# Patient Record
Sex: MALE | Race: WHITE | Employment: PART TIME | URBAN - NONMETROPOLITAN AREA
[De-identification: names, ages, dates, MRNs, and addresses within clinical notes are randomized per-mention and may not be internally consistent; named-entity substitution may affect disease eponyms.]

---

## 2022-08-08 ENCOUNTER — OFFICE VISIT (OUTPATIENT)
Dept: FAMILY MEDICINE CLINIC | Age: 62
End: 2022-08-08
Payer: MEDICARE

## 2022-08-08 VITALS
BODY MASS INDEX: 30.06 KG/M2 | HEART RATE: 88 BPM | DIASTOLIC BLOOD PRESSURE: 76 MMHG | HEIGHT: 70 IN | WEIGHT: 210 LBS | RESPIRATION RATE: 20 BRPM | TEMPERATURE: 98.3 F | OXYGEN SATURATION: 96 % | SYSTOLIC BLOOD PRESSURE: 138 MMHG

## 2022-08-08 DIAGNOSIS — R11.0 NAUSEA: Primary | ICD-10-CM

## 2022-08-08 PROCEDURE — 99213 OFFICE O/P EST LOW 20 MIN: CPT

## 2022-08-08 RX ORDER — ONDANSETRON 4 MG/1
4 TABLET, ORALLY DISINTEGRATING ORAL 3 TIMES DAILY PRN
Qty: 21 TABLET | Refills: 0 | Status: SHIPPED | OUTPATIENT
Start: 2022-08-08

## 2022-08-08 RX ORDER — MIRTAZAPINE 15 MG/1
15 TABLET, FILM COATED ORAL NIGHTLY
COMMUNITY

## 2022-08-08 RX ORDER — BUPRENORPHINE AND NALOXONE 8; 2 MG/1; MG/1
1 FILM, SOLUBLE BUCCAL; SUBLINGUAL DAILY
COMMUNITY

## 2022-08-08 NOTE — PROGRESS NOTES
Chief Complaint       Nausea  X years   History of Present Illness   Source of history provided by:  patient. Ramos Ghosh is a 57 y.o. old male presenting to the walk in clinic for complaints of nausea and \"nervous stomach\" x years. He is out of Zofran and his OTC medications have not been helping. Denies any vomiting, diarrhea, changes to bowel movements or abdominal pain. Denies any fever, bloody stools, loss of taste/smell, hematochezia, CP, SOB, dysuria, hematuria, HA, sore throat, rash, or lethargy. No LMP for male patient. ROS    Unless otherwise stated in this report or unable to obtain because of the patient's clinical or mental status as evidenced by the medical record, this patients's positive and negative responses for Review of Systems, constitutional, psych, eyes, ENT, cardiovascular, respiratory, gastrointestinal, neurological, genitourinary, musculoskeletal, integument systems and systems related to the presenting problem are either stated in the preceding or were not pertinent or were negative for the symptoms and/or complaints related to the medical problem. Physical Exam         VS:  /76   Pulse 88   Temp 98.3 °F (36.8 °C) (Temporal)   Resp 20   Ht 5' 10\" (1.778 m)   Wt 210 lb (95.3 kg)   SpO2 96%   BMI 30.13 kg/m²    Oxygen Saturation Interpretation: Normal.    General Appearance/Constitutional:  Alert, development consistent with age, NAD. HEENT:  NCAT. MMMP  Lungs: CTAB without wheezing, rales, or rhonchi. Heart:  RRR, no murmurs, rubs, or gallops. Abdomen:  General Appearance: No obvious trauma or bruising. No rashes or lesions. Bowel sounds: BS+x4       Distension:  No distension. Tenderness: None       Liver/Spleen: Non-tender and no hepatosplenomegaly. Pulsatile Mass: None noted. Back: CVA Tenderness: No bilateral tenderness or bruising. Skin:  Normal turgor.   Warm, dry, without visible rash, unless noted elsewhere. Neurological:  Orientation age-appropriate. Motor functions intact. Lab / Imaging Results   (All laboratory and radiology results have been personally reviewed by myself)  Labs:  No results found for this visit on 08/08/22. Imaging: All Radiology results interpreted by Radiologist unless otherwise noted. Assessment / Plan     Impression(s):  Jay Sunshine was seen today for nausea. Diagnoses and all orders for this visit:    Nausea  -     ondansetron (ZOFRAN ODT) 4 MG disintegrating tablet; Take 1 tablet by mouth 3 times daily as needed for Nausea or Vomiting    Disposition:  Disposition: Discharge to home. Script written for zofran, side effects discussed. Increase fluids and rest. Clear liquids progressing to [employer] as tolerated. Advise f/u with PCP in 3-5 days if symptoms persist. ED sooner if symptoms worsen or change. ED immediately with the development of high or refractory fever, severe or worsening abdominal pain, hematochezia, coffee-ground emesis, bloody stools, lethargy, CP, or SOB. Pt states understanding and is in agreement with this care plan. All questions answered. GRETA Coello    **This report was transcribed using voice recognition software. Every effort was made to ensure accuracy; however, inadvertent computerized transcription errors may be present.

## 2022-11-07 ENCOUNTER — OFFICE VISIT (OUTPATIENT)
Dept: FAMILY MEDICINE CLINIC | Age: 62
End: 2022-11-07
Payer: MEDICARE

## 2022-11-07 VITALS
WEIGHT: 216 LBS | HEIGHT: 70 IN | TEMPERATURE: 98 F | OXYGEN SATURATION: 96 % | RESPIRATION RATE: 20 BRPM | BODY MASS INDEX: 30.92 KG/M2 | DIASTOLIC BLOOD PRESSURE: 70 MMHG | HEART RATE: 88 BPM | SYSTOLIC BLOOD PRESSURE: 126 MMHG

## 2022-11-07 DIAGNOSIS — R30.0 DYSURIA: ICD-10-CM

## 2022-11-07 DIAGNOSIS — N39.0 COMPLICATED URINARY TRACT INFECTION: Primary | ICD-10-CM

## 2022-11-07 LAB
BILIRUBIN, POC: ABNORMAL
BLOOD URINE, POC: ABNORMAL
CLARITY, POC: ABNORMAL
COLOR, POC: ABNORMAL
GLUCOSE URINE, POC: ABNORMAL
KETONES, POC: ABNORMAL
LEUKOCYTE EST, POC: ABNORMAL
NITRITE, POC: POSITIVE
PH, POC: 5
PROTEIN, POC: 100
SPECIFIC GRAVITY, POC: 1.02
UROBILINOGEN, POC: 0.2

## 2022-11-07 PROCEDURE — 99214 OFFICE O/P EST MOD 30 MIN: CPT | Performed by: NURSE PRACTITIONER

## 2022-11-07 PROCEDURE — 81002 URINALYSIS NONAUTO W/O SCOPE: CPT | Performed by: NURSE PRACTITIONER

## 2022-11-07 RX ORDER — ALBUTEROL SULFATE 2.5 MG/3ML
SOLUTION RESPIRATORY (INHALATION)
COMMUNITY
Start: 2022-08-01

## 2022-11-07 RX ORDER — MIRTAZAPINE 30 MG/1
TABLET, FILM COATED ORAL
COMMUNITY
Start: 2022-11-04

## 2022-11-07 RX ORDER — CIPROFLOXACIN 500 MG/1
500 TABLET, FILM COATED ORAL 2 TIMES DAILY
Qty: 14 TABLET | Refills: 0 | Status: SHIPPED | OUTPATIENT
Start: 2022-11-07 | End: 2022-11-14

## 2022-11-07 RX ORDER — PHENAZOPYRIDINE HYDROCHLORIDE 100 MG/1
100 TABLET, FILM COATED ORAL 3 TIMES DAILY PRN
Qty: 6 TABLET | Refills: 0 | Status: SHIPPED | OUTPATIENT
Start: 2022-11-07 | End: 2022-11-09

## 2022-11-07 RX ORDER — IBUPROFEN 200 MG
200 TABLET ORAL EVERY 6 HOURS PRN
COMMUNITY

## 2022-11-07 NOTE — PROGRESS NOTES
2022     Ana Maria Agn 60 y.o. male    : 1960  Chief Complaint:   Dysuria      History of Present Illness   Source of history provided by:  patient. Ana Maria Ang is a 60 y.o. old male who presents to the [facility] care with complaints of dysuria x 3 days. Reports associated frequency, urgency, and suprapubic pressure. Denies gross hematuria. Denies associated flank pain. Denies any fever, chills, nausea, vomiting, diarrhea, or lethargy. ROS   Past Medical History: History reviewed. No pertinent past medical history. Past Surgical History: History reviewed. No pertinent surgical history. Social History:  reports that he has been smoking cigarettes. He has never used smokeless tobacco.  Family History: family history is not on file. Allergies: Citalopram, Gabapentin, and Ketorolac tromethamine    Unless otherwise stated in this report the patient's positive and negative responses for review of systems for constitutional, eyes, ENT, cardiovascular, respiratory, gastrointestinal, neurological, , musculoskeletal, and integument systems and related systems to the presenting problem are either stated in the history of present illness or were not pertinent or were negative for the symptoms and/or complaints related to the presenting medical problem. Positives and pertinent negatives as per HPI. All others reviewed and are negative. Physical Exam   VS:   Vitals:    22 1400   BP: 126/70   Pulse: 88   Resp: 20   Temp: 98 °F (36.7 °C)   TempSrc: Temporal   SpO2: 96%   Weight: 216 lb (98 kg)   Height: 5' 10\" (1.778 m)     Oxygen Saturation Interpretation: Normal.    Constitutional:  A&Ox3, development consistent with age, NAD. Lungs:  CTAB without wheezing, rales, or rhonchi. Heart:  RRR without pathologic murmurs, rubs, or gallops. Abdomen: Soft, nondistended, with mild suprapubic tenderness. No rebound, rigidity, or guarding. BS+ X4. No organomegaly.      Back: No CVA tenderness. Skin:  Normal turgor. Warm, dry, without visible rash, unless noted elsewhere. Neurological:  Alert and oriented. Motor functions intact. Responds to verbal commands. Lab / Imaging Results   All laboratory and radiology results have been personally reviewed by myself. Labs:  Results for orders placed or performed in visit on 11/07/22   POCT Urinalysis no Micro   Result Value Ref Range    Color, UA tyson     Clarity, UA cloudy     Glucose, UA POC neg     Bilirubin, UA neg     Ketones, UA neg     Spec Grav, UA 1.025     Blood, UA POC large     pH, UA 5.0     Protein, UA      Urobilinogen, UA 0.2     Leukocytes, UA large     Nitrite, UA positive        Imaging: All Radiology results interpreted by Radiologist unless otherwise noted. No orders to display       Medical Decision Making:    Patient is well appearing, non toxic and appropriate for outpatient management. Plan is for symptom management and PCP follow up. Assessment / Prudence Carina was seen today for dysuria. Diagnoses and all orders for this visit:    Complicated urinary tract infection  -     ciprofloxacin (CIPRO) 500 MG tablet; Take 1 tablet by mouth 2 times daily for 7 days  -     phenazopyridine (PYRIDIUM) 100 MG tablet; Take 1 tablet by mouth 3 times daily as needed for Pain    Dysuria  -     POCT Urinalysis no Micro  -     Culture, Urine; Future      UA appears positive for a UTI. Urine C&S pending, will call with results once available. Script written for Cipro and Pyridium, side effects discussed. Increase fluids and rest. F/u PCP in 3-5 days if symptoms persist. ED sooner if symptoms worsen or change. ED immediately with the development of fever, shaking chills, body aches, flank pain, vomiting, CP, or SOB. Pt is in agreement with this care plan. All questions answered.      Justin Mortimer, APRN - NP

## 2022-11-10 LAB
ORGANISM: ABNORMAL
URINE CULTURE, ROUTINE: ABNORMAL

## 2022-12-05 ENCOUNTER — OFFICE VISIT (OUTPATIENT)
Dept: FAMILY MEDICINE CLINIC | Age: 62
End: 2022-12-05
Payer: MEDICARE

## 2022-12-05 VITALS
SYSTOLIC BLOOD PRESSURE: 130 MMHG | HEIGHT: 70 IN | OXYGEN SATURATION: 96 % | TEMPERATURE: 97 F | DIASTOLIC BLOOD PRESSURE: 80 MMHG | BODY MASS INDEX: 30.92 KG/M2 | RESPIRATION RATE: 22 BRPM | WEIGHT: 216 LBS | HEART RATE: 82 BPM

## 2022-12-05 DIAGNOSIS — R30.0 DYSURIA: ICD-10-CM

## 2022-12-05 DIAGNOSIS — N20.0 LEFT NEPHROLITHIASIS: Primary | ICD-10-CM

## 2022-12-05 LAB
BILIRUBIN, POC: ABNORMAL
BLOOD URINE, POC: ABNORMAL
CLARITY, POC: ABNORMAL
COLOR, POC: YELLOW
GLUCOSE URINE, POC: ABNORMAL
KETONES, POC: ABNORMAL
LEUKOCYTE EST, POC: ABNORMAL
NITRITE, POC: ABNORMAL
PH, POC: 5
PROTEIN, POC: 100
SPECIFIC GRAVITY, POC: >=1.03
UROBILINOGEN, POC: 0.2

## 2022-12-05 PROCEDURE — 99214 OFFICE O/P EST MOD 30 MIN: CPT | Performed by: NURSE PRACTITIONER

## 2022-12-05 PROCEDURE — 81002 URINALYSIS NONAUTO W/O SCOPE: CPT | Performed by: NURSE PRACTITIONER

## 2022-12-05 NOTE — PROGRESS NOTES
2022     Heena Mancia 60 y.o. male    : 1960  Chief Complaint:   Dysuria (/)      History of Present Illness   Source of history provided by:  patient. Heena Mancia is a 60 y.o. old male who presents to the [facility] care with complaints of dysuria x 3 days. Reports associated frequency, urgency and hesitancy. Denies gross hematuria. Denies associated flank pain. Denies any fever, chills, nausea, vomiting, diarrhea, or lethargy. Patient presented on  for similar etiology. At that time his UA was positive and he was treated with ciprofloxacin. Urine culture grew greater less than 25,000 E. coli. He reports that he took this antibiotic in its entirety and symptoms completely resolved. ROS   Past Medical History: History reviewed. No pertinent past medical history. Past Surgical History: History reviewed. No pertinent surgical history. Social History:  reports that he has been smoking cigarettes. He has never used smokeless tobacco.  Family History: family history is not on file. Allergies: Citalopram, Gabapentin, and Ketorolac tromethamine    Unless otherwise stated in this report the patient's positive and negative responses for review of systems for constitutional, eyes, ENT, cardiovascular, respiratory, gastrointestinal, neurological, , musculoskeletal, and integument systems and related systems to the presenting problem are either stated in the history of present illness or were not pertinent or were negative for the symptoms and/or complaints related to the presenting medical problem. Positives and pertinent negatives as per HPI. All others reviewed and are negative.     Physical Exam   VS:   Vitals:    22 1331   BP: 130/80   Pulse: 82   Resp: 22   Temp: 97 °F (36.1 °C)   TempSrc: Temporal   SpO2: 96%   Weight: 216 lb (98 kg)   Height: 5' 10\" (1.778 m)     Oxygen Saturation Interpretation: Normal.    Constitutional:  A&Ox3, development consistent with age, NAD. Lungs:  CTAB without wheezing, rales, or rhonchi. Heart:  RRR without pathologic murmurs, rubs, or gallops. Abdomen: Soft, nondistended, with mild suprapubic tenderness. No rebound, rigidity, or guarding. BS+ X4. No organomegaly. Back: No CVA tenderness. He does complain of left lumbar back pain intermittently. Skin:  Normal turgor. Warm, dry, without visible rash, unless noted elsewhere. Neurological:  Alert and oriented. Motor functions intact. Responds to verbal commands. Lab / Imaging Results   All laboratory and radiology results have been personally reviewed by myself. Labs:  Results for orders placed or performed in visit on 12/05/22   POCT Urinalysis no Micro   Result Value Ref Range    Color, UA yellow     Clarity, UA cloudy     Glucose, UA POC neg     Bilirubin, UA small     Ketones, UA trace     Spec Grav, UA >=1.030     Blood, UA POC neg     pH, UA 5.0     Protein, UA      Urobilinogen, UA 0.2     Leukocytes, UA small     Nitrite, UA neg        Imaging: All Radiology results interpreted by Radiologist unless otherwise noted. XR ABDOMEN (KUB) (SINGLE AP VIEW)    (Results Pending)       Medical Decision Making:    Patient is well appearing, non toxic and appropriate for outpatient management. Plan is for symptom management and PCP follow up. Assessment / Louana Maker was seen today for dysuria. Diagnoses and all orders for this visit:    Left nephrolithiasis    Dysuria  -     POCT Urinalysis no Micro  -     Culture, Urine; Future  -     XR ABDOMEN (KUB) (SINGLE AP VIEW); Future    Other orders  -     tamsulosin (FLOMAX) 0.4 MG capsule; Take 1 capsule by mouth daily      UA appears negative for a UTI. Urine C&S pending, will call with results once available. Will obtain x-ray KUB to rule out possible nephrolithiasis. If this is negative I suspect this is BPH etiology and patient should follow-up with PCP for further evaluation.   Increase fluids and rest. F/u PCP in 3-5 days if symptoms persist. ED sooner if symptoms worsen or change. ED immediately with the development of fever, shaking chills, body aches, flank pain, vomiting, CP, or SOB. Pt is in agreement with this care plan. All questions answered. STUART Alexander - NP     ADDENDUM: I called and personally spoke to the patient at 1900 on 12/6/2022 re: KUB results. He is positive for a 3mm nephrolithiasis to the left renal pole, no hydronephrosis. I called in flomax to his local pharmacy, side effects and administration instructions discussed. Patient is to follow up with his PCP in 3-5 days. Increase fluids and strain all urine. Patient was agreeable to the above plan and verbalized understanding.

## 2022-12-06 RX ORDER — TAMSULOSIN HYDROCHLORIDE 0.4 MG/1
0.4 CAPSULE ORAL DAILY
Qty: 30 CAPSULE | Refills: 0 | Status: SHIPPED | OUTPATIENT
Start: 2022-12-06

## 2022-12-08 LAB — URINE CULTURE, ROUTINE: NORMAL

## 2022-12-29 RX ORDER — TAMSULOSIN HYDROCHLORIDE 0.4 MG/1
CAPSULE ORAL
Qty: 30 CAPSULE | Refills: 0 | OUTPATIENT
Start: 2022-12-29

## 2023-02-09 ENCOUNTER — OFFICE VISIT (OUTPATIENT)
Dept: FAMILY MEDICINE CLINIC | Age: 63
End: 2023-02-09
Payer: MEDICARE

## 2023-02-09 VITALS
RESPIRATION RATE: 20 BRPM | DIASTOLIC BLOOD PRESSURE: 64 MMHG | BODY MASS INDEX: 30.92 KG/M2 | WEIGHT: 216 LBS | HEART RATE: 91 BPM | OXYGEN SATURATION: 96 % | HEIGHT: 70 IN | TEMPERATURE: 97.1 F | SYSTOLIC BLOOD PRESSURE: 130 MMHG

## 2023-02-09 DIAGNOSIS — S40.861A INSECT BITE OF RIGHT UPPER EXTREMITY, INITIAL ENCOUNTER: Primary | ICD-10-CM
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
## 2023-02-09 PROCEDURE — 99213 OFFICE O/P EST LOW 20 MIN: CPT

## 2023-02-09 RX ORDER — ESCITALOPRAM OXALATE 10 MG/1
TABLET ORAL
COMMUNITY
Start: 2023-01-25

## 2023-02-09 RX ORDER — DOXYCYCLINE HYCLATE 100 MG
100 TABLET ORAL 2 TIMES DAILY
Qty: 20 TABLET | Refills: 0 | Status: SHIPPED | OUTPATIENT
Start: 2023-02-09 | End: 2023-02-19

## 2023-02-09 RX ORDER — TRAZODONE HYDROCHLORIDE 150 MG/1
TABLET ORAL
COMMUNITY
Start: 2023-01-25

## 2023-02-09 RX ORDER — TRAZODONE HYDROCHLORIDE 100 MG/1
TABLET ORAL
COMMUNITY
Start: 2022-12-28

## 2023-02-09 ASSESSMENT — ENCOUNTER SYMPTOMS
STRIDOR: 0
SINUS PRESSURE: 0
COUGH: 0
ABDOMINAL PAIN: 0
APNEA: 0
SORE THROAT: 0
VOMITING: 0
SHORTNESS OF BREATH: 0
CHEST TIGHTNESS: 0

## 2023-02-09 NOTE — PROGRESS NOTES
Chief Complaint:   Insect Bite      History of Present Illness   HPI:  Cornelius Phillips is a 60 y.o. male who presents to [facility] today for possible spider bite for 2 weeks. Denies fevers,  chills, ,n/v/d, CP, or SOB. Tetanus UTD. Pt states he \"lanced\" it at home with a knife and nothing drained. Prior to Visit Medications    Medication Sig Taking? Authorizing Provider   escitalopram (LEXAPRO) 10 MG tablet take 1 tablet by mouth at bedtime Yes Historical Provider, MD   traZODone (DESYREL) 100 MG tablet take 1 tablet by mouth at bedtime if needed for insomnia Yes Historical Provider, MD   traZODone (DESYREL) 150 MG tablet take 1-2 tablets by mouth at bedtime Yes Historical Provider, MD   tamsulosin (FLOMAX) 0.4 MG capsule Take 1 capsule by mouth daily Yes STUART Arceo - NP   ANORO ELLIPTA 62.5-25 MCG/INH AEPB inhaler inhale 1 puff by mouth and INTO THE LUNGS once daily Yes Historical Provider, MD   mirtazapine (REMERON) 30 MG tablet  Yes Historical Provider, MD   ibuprofen (ADVIL;MOTRIN) 200 MG tablet Take 200 mg by mouth every 6 hours as needed Yes Historical Provider, MD   albuterol (PROVENTIL) (2.5 MG/3ML) 0.083% nebulizer solution inhale contents of 1 vial ( 3 milliliters ) in nebulizer by mouth. ..  (REFER TO PRESCRIPTION NOTES). Yes Historical Provider, MD   buprenorphine-naloxone (SUBOXONE) 8-2 MG FILM SL film Place 1 Film under the tongue in the morning. Yes Historical Provider, MD   mirtazapine (REMERON) 15 MG tablet Take 15 mg by mouth nightly Yes Historical Provider, MD   ondansetron (ZOFRAN ODT) 4 MG disintegrating tablet Take 1 tablet by mouth 3 times daily as needed for Nausea or Vomiting Yes GRETA Ellington       Review of Systems   Review of Systems   Constitutional:  Negative for activity change, appetite change and fever. HENT:  Negative for congestion, sinus pressure and sore throat.     Respiratory:  Negative for apnea, cough, chest tightness, shortness of breath and stridor. Cardiovascular:  Negative for chest pain. Gastrointestinal:  Negative for abdominal pain and vomiting. Genitourinary:  Negative for dysuria. Musculoskeletal:  Negative for myalgias. Skin:  Positive for wound. Negative for rash. Neurological:  Negative for weakness and numbness. Patient's medical, social, and family history reviewed    Past Medical History:  has no past medical history on file. Past Surgical History:  has no past surgical history on file. Social History:  reports that he has been smoking cigarettes. He has never used smokeless tobacco.  Family History: family history is not on file. Allergies: Citalopram, Gabapentin, and Ketorolac tromethamine    Physical Exam   Vital Signs:  /64   Pulse 91   Temp 97.1 °F (36.2 °C) (Temporal)   Resp 20   Ht 5' 10\" (1.778 m)   Wt 216 lb (98 kg)   SpO2 96%   BMI 30.99 kg/m²    Oxygen Saturation Interpretation: Normal.    Physical Exam  Constitutional:       Appearance: Normal appearance. HENT:      Right Ear: Tympanic membrane and ear canal normal.      Left Ear: Tympanic membrane and ear canal normal.      Mouth/Throat:      Mouth: Mucous membranes are moist.      Pharynx: Oropharynx is clear. Eyes:      Pupils: Pupils are equal, round, and reactive to light. Cardiovascular:      Rate and Rhythm: Normal rate and regular rhythm. Pulmonary:      Effort: Pulmonary effort is normal.      Breath sounds: Normal breath sounds. Abdominal:      General: Abdomen is flat. Palpations: Abdomen is soft. Skin:     General: Skin is warm and dry. Capillary Refill: Capillary refill takes less than 2 seconds. Findings: Abscess present. Neurological:      Mental Status: He is alert. Test Results Section   (All laboratory and radiology results have been personally reviewed by myself)  Labs:  No results found for this visit on 02/09/23. Imaging:   All Radiology results interpreted by Radiologist unless otherwise noted. No results found. Assessment / Plan   Impression(s):  Buster Faulkner was seen today for insect bite. Diagnoses and all orders for this visit:    Insect bite of right upper extremity, initial encounter  -     doxycycline hyclate (VIBRA-TABS) 100 MG tablet; Take 1 tablet by mouth 2 times daily for 10 days    Discharged home. Patient condition is good    Script for Doxycycline prescribed, side effects discussed. Apply warm compressed throughout the day. Red flag symptoms reviewed. Follow up with PCP if needed. ED sooner if symptoms worsen. Pt is agreeable to plan of care and all questions were answered at this time.      New Medications     New Prescriptions    DOXYCYCLINE HYCLATE (VIBRA-TABS) 100 MG TABLET    Take 1 tablet by mouth 2 times daily for 10 days       Electronically signed by STUART Azul CNP   DD: 2/9/23

## 2023-10-19 LAB
MICROORGANISM SPEC CULT: NO GROWTH
NON-GYN CYTOLOGY REPORT: NORMAL
SPECIMEN DESCRIPTION: NORMAL

## 2023-12-31 LAB
MICROORGANISM SPEC CULT: ABNORMAL
SPECIMEN DESCRIPTION: ABNORMAL

## 2024-01-08 LAB — PSA SERPL-MCNC: 2.5 NG/ML (ref 0–4)

## 2024-09-25 ENCOUNTER — APPOINTMENT (OUTPATIENT)
Dept: CT IMAGING | Age: 64
End: 2024-09-25
Payer: MEDICARE

## 2024-09-25 ENCOUNTER — HOSPITAL ENCOUNTER (EMERGENCY)
Age: 64
Discharge: HOME | End: 2024-09-26
Payer: MEDICARE

## 2024-09-25 VITALS
HEART RATE: 96 BPM | HEIGHT: 70 IN | DIASTOLIC BLOOD PRESSURE: 91 MMHG | WEIGHT: 215 LBS | TEMPERATURE: 98.7 F | BODY MASS INDEX: 30.78 KG/M2 | OXYGEN SATURATION: 97 % | SYSTOLIC BLOOD PRESSURE: 133 MMHG | RESPIRATION RATE: 20 BRPM

## 2024-09-25 DIAGNOSIS — S32.010A COMPRESSION FRACTURE OF L1 VERTEBRA, INITIAL ENCOUNTER (HCC): ICD-10-CM

## 2024-09-25 DIAGNOSIS — M54.50 ACUTE EXACERBATION OF CHRONIC LOW BACK PAIN: Primary | ICD-10-CM

## 2024-09-25 DIAGNOSIS — G89.29 ACUTE EXACERBATION OF CHRONIC LOW BACK PAIN: Primary | ICD-10-CM

## 2024-09-25 DIAGNOSIS — S22.080A COMPRESSION FRACTURE OF T12 VERTEBRA, INITIAL ENCOUNTER (HCC): ICD-10-CM

## 2024-09-25 PROCEDURE — 72131 CT LUMBAR SPINE W/O DYE: CPT

## 2024-09-25 PROCEDURE — 6370000000 HC RX 637 (ALT 250 FOR IP)

## 2024-09-25 PROCEDURE — 99284 EMERGENCY DEPT VISIT MOD MDM: CPT

## 2024-09-25 RX ORDER — METHOCARBAMOL 750 MG/1
750 TABLET, FILM COATED ORAL 4 TIMES DAILY
Qty: 20 TABLET | Refills: 0 | Status: SHIPPED | OUTPATIENT
Start: 2024-09-25 | End: 2024-09-30

## 2024-09-25 RX ORDER — PREDNISONE 20 MG/1
40 TABLET ORAL DAILY
Qty: 10 TABLET | Refills: 0 | Status: SHIPPED | OUTPATIENT
Start: 2024-09-25 | End: 2024-09-30

## 2024-09-25 RX ORDER — PREDNISONE 20 MG/1
60 TABLET ORAL ONCE
Status: COMPLETED | OUTPATIENT
Start: 2024-09-25 | End: 2024-09-25

## 2024-09-25 RX ORDER — METHOCARBAMOL 750 MG/1
750 TABLET, FILM COATED ORAL ONCE
Status: COMPLETED | OUTPATIENT
Start: 2024-09-25 | End: 2024-09-25

## 2024-09-25 RX ORDER — LIDOCAINE 4 G/G
1 PATCH TOPICAL ONCE
Status: DISCONTINUED | OUTPATIENT
Start: 2024-09-25 | End: 2024-09-26 | Stop reason: HOSPADM

## 2024-09-25 RX ORDER — NAPROXEN 250 MG/1
500 TABLET ORAL ONCE
Status: DISCONTINUED | OUTPATIENT
Start: 2024-09-25 | End: 2024-09-26 | Stop reason: HOSPADM

## 2024-09-25 RX ORDER — LIDOCAINE 4 G/G
1 PATCH TOPICAL DAILY
Qty: 5 EACH | Refills: 0 | Status: SHIPPED | OUTPATIENT
Start: 2024-09-25 | End: 2024-09-30

## 2024-09-25 RX ORDER — NAPROXEN 500 MG/1
500 TABLET ORAL 2 TIMES DAILY PRN
Qty: 10 TABLET | Refills: 0 | Status: SHIPPED | OUTPATIENT
Start: 2024-09-25 | End: 2024-09-30

## 2024-09-25 RX ORDER — LIDOCAINE 4 G/G
1 PATCH TOPICAL DAILY
Status: DISCONTINUED | OUTPATIENT
Start: 2024-09-26 | End: 2024-09-25

## 2024-09-25 RX ADMIN — PREDNISONE 60 MG: 20 TABLET ORAL at 21:31

## 2024-09-25 RX ADMIN — METHOCARBAMOL TABLETS 750 MG: 750 TABLET, COATED ORAL at 21:31

## 2024-09-27 ASSESSMENT — ENCOUNTER SYMPTOMS
EYES NEGATIVE: 1
ALLERGIC/IMMUNOLOGIC NEGATIVE: 1
GASTROINTESTINAL NEGATIVE: 1
RESPIRATORY NEGATIVE: 1

## 2025-03-02 ENCOUNTER — APPOINTMENT (OUTPATIENT)
Dept: ULTRASOUND IMAGING | Age: 65
End: 2025-03-02
Payer: MEDICARE

## 2025-03-02 ENCOUNTER — APPOINTMENT (OUTPATIENT)
Dept: CT IMAGING | Age: 65
End: 2025-03-02
Payer: MEDICARE

## 2025-03-02 ENCOUNTER — HOSPITAL ENCOUNTER (EMERGENCY)
Age: 65
Discharge: HOME | End: 2025-03-02
Attending: EMERGENCY MEDICINE
Payer: MEDICARE

## 2025-03-02 VITALS
HEIGHT: 70 IN | DIASTOLIC BLOOD PRESSURE: 80 MMHG | HEART RATE: 74 BPM | BODY MASS INDEX: 29.35 KG/M2 | OXYGEN SATURATION: 90 % | SYSTOLIC BLOOD PRESSURE: 130 MMHG | TEMPERATURE: 97.9 F | WEIGHT: 205 LBS | RESPIRATION RATE: 8 BRPM

## 2025-03-02 DIAGNOSIS — N30.01 ACUTE CYSTITIS WITH HEMATURIA: Primary | ICD-10-CM

## 2025-03-02 DIAGNOSIS — L98.8 FISTULA: ICD-10-CM

## 2025-03-02 LAB
ALBUMIN SERPL-MCNC: 3.8 G/DL (ref 3.5–5.2)
ALP SERPL-CCNC: 84 U/L (ref 40–129)
ALT SERPL-CCNC: 14 U/L (ref 0–40)
ANION GAP SERPL CALCULATED.3IONS-SCNC: 13 MMOL/L (ref 7–16)
AST SERPL-CCNC: 12 U/L (ref 0–39)
BACTERIA URNS QL MICRO: ABNORMAL
BASOPHILS # BLD: 0.07 K/UL (ref 0–0.2)
BASOPHILS NFR BLD: 1 % (ref 0–2)
BILIRUB SERPL-MCNC: 0.3 MG/DL (ref 0–1.2)
BILIRUB UR QL STRIP: ABNORMAL
BUN SERPL-MCNC: 13 MG/DL (ref 6–23)
CALCIUM SERPL-MCNC: 9.2 MG/DL (ref 8.6–10.2)
CHLORIDE SERPL-SCNC: 101 MMOL/L (ref 98–107)
CLARITY UR: CLEAR
CO2 SERPL-SCNC: 24 MMOL/L (ref 22–29)
COLOR UR: YELLOW
CREAT SERPL-MCNC: 0.7 MG/DL (ref 0.7–1.2)
EOSINOPHIL # BLD: 0.14 K/UL (ref 0.05–0.5)
EOSINOPHILS RELATIVE PERCENT: 1 % (ref 0–6)
ERYTHROCYTE [DISTWIDTH] IN BLOOD BY AUTOMATED COUNT: 12.9 % (ref 11.5–15)
GFR, ESTIMATED: >90 ML/MIN/1.73M2
GLUCOSE SERPL-MCNC: 114 MG/DL (ref 74–99)
GLUCOSE UR STRIP-MCNC: 100 MG/DL
HCT VFR BLD AUTO: 41.4 % (ref 37–54)
HGB BLD-MCNC: 13.5 G/DL (ref 12.5–16.5)
HGB UR QL STRIP.AUTO: ABNORMAL
IMM GRANULOCYTES # BLD AUTO: 0.05 K/UL (ref 0–0.58)
IMM GRANULOCYTES NFR BLD: 1 % (ref 0–5)
KETONES UR STRIP-MCNC: ABNORMAL MG/DL
LEUKOCYTE ESTERASE UR QL STRIP: ABNORMAL
LYMPHOCYTES NFR BLD: 1.49 K/UL (ref 1.5–4)
LYMPHOCYTES RELATIVE PERCENT: 15 % (ref 20–42)
MCH RBC QN AUTO: 30 PG (ref 26–35)
MCHC RBC AUTO-ENTMCNC: 32.6 G/DL (ref 32–34.5)
MCV RBC AUTO: 92 FL (ref 80–99.9)
MONOCYTES NFR BLD: 0.85 K/UL (ref 0.1–0.95)
MONOCYTES NFR BLD: 9 % (ref 2–12)
NEUTROPHILS NFR BLD: 74 % (ref 43–80)
NEUTS SEG NFR BLD: 7.19 K/UL (ref 1.8–7.3)
NITRITE UR QL STRIP: POSITIVE
PH UR STRIP: 6.5 [PH] (ref 5–8)
PLATELET # BLD AUTO: 313 K/UL (ref 130–450)
PMV BLD AUTO: 9 FL (ref 7–12)
POTASSIUM SERPL-SCNC: 4 MMOL/L (ref 3.5–5)
PROT SERPL-MCNC: 7.2 G/DL (ref 6.4–8.3)
PROT UR STRIP-MCNC: >=300 MG/DL
RBC # BLD AUTO: 4.5 M/UL (ref 3.8–5.8)
RBC #/AREA URNS HPF: ABNORMAL /HPF
SODIUM SERPL-SCNC: 138 MMOL/L (ref 132–146)
SP GR UR STRIP: 1.02 (ref 1–1.03)
UROBILINOGEN UR STRIP-ACNC: 1 EU/DL (ref 0–1)
WBC #/AREA URNS HPF: ABNORMAL /HPF
WBC OTHER # BLD: 9.8 K/UL (ref 4.5–11.5)

## 2025-03-02 PROCEDURE — 6360000002 HC RX W HCPCS

## 2025-03-02 PROCEDURE — 6360000004 HC RX CONTRAST MEDICATION: Performed by: RADIOLOGY

## 2025-03-02 PROCEDURE — 80053 COMPREHEN METABOLIC PANEL: CPT

## 2025-03-02 PROCEDURE — 2500000003 HC RX 250 WO HCPCS

## 2025-03-02 PROCEDURE — 85025 COMPLETE CBC W/AUTO DIFF WBC: CPT

## 2025-03-02 PROCEDURE — 99285 EMERGENCY DEPT VISIT HI MDM: CPT

## 2025-03-02 PROCEDURE — 96374 THER/PROPH/DIAG INJ IV PUSH: CPT

## 2025-03-02 PROCEDURE — 81001 URINALYSIS AUTO W/SCOPE: CPT

## 2025-03-02 PROCEDURE — 87088 URINE BACTERIA CULTURE: CPT

## 2025-03-02 PROCEDURE — 74177 CT ABD & PELVIS W/CONTRAST: CPT

## 2025-03-02 PROCEDURE — 76870 US EXAM SCROTUM: CPT

## 2025-03-02 PROCEDURE — 87086 URINE CULTURE/COLONY COUNT: CPT

## 2025-03-02 PROCEDURE — 93975 VASCULAR STUDY: CPT

## 2025-03-02 RX ORDER — IOPAMIDOL 755 MG/ML
75 INJECTION, SOLUTION INTRAVASCULAR
Status: COMPLETED | OUTPATIENT
Start: 2025-03-02 | End: 2025-03-02

## 2025-03-02 RX ORDER — ONDANSETRON 4 MG/1
4 TABLET, ORALLY DISINTEGRATING ORAL ONCE
Status: DISCONTINUED | OUTPATIENT
Start: 2025-03-02 | End: 2025-03-02

## 2025-03-02 RX ORDER — ONDANSETRON 2 MG/ML
INJECTION INTRAMUSCULAR; INTRAVENOUS
Status: COMPLETED
Start: 2025-03-02 | End: 2025-03-02

## 2025-03-02 RX ADMIN — WATER 1000 MG: 1 INJECTION INTRAMUSCULAR; INTRAVENOUS; SUBCUTANEOUS at 12:27

## 2025-03-02 RX ADMIN — IOPAMIDOL 75 ML: 755 INJECTION, SOLUTION INTRAVENOUS at 12:17

## 2025-03-02 RX ADMIN — ONDANSETRON 4 MG: 2 INJECTION, SOLUTION INTRAMUSCULAR; INTRAVENOUS at 11:25

## 2025-03-02 ASSESSMENT — PAIN DESCRIPTION - DESCRIPTORS: DESCRIPTORS: ACHING

## 2025-03-02 ASSESSMENT — PAIN DESCRIPTION - ONSET: ONSET: ON-GOING

## 2025-03-02 ASSESSMENT — LIFESTYLE VARIABLES
HOW OFTEN DO YOU HAVE A DRINK CONTAINING ALCOHOL: NEVER
HOW MANY STANDARD DRINKS CONTAINING ALCOHOL DO YOU HAVE ON A TYPICAL DAY: PATIENT DOES NOT DRINK

## 2025-03-02 ASSESSMENT — PAIN SCALES - GENERAL: PAINLEVEL_OUTOF10: 8

## 2025-03-02 ASSESSMENT — PAIN DESCRIPTION - PAIN TYPE: TYPE: ACUTE PAIN

## 2025-03-02 ASSESSMENT — PAIN DESCRIPTION - LOCATION: LOCATION: ABDOMEN;BACK

## 2025-03-02 ASSESSMENT — PAIN - FUNCTIONAL ASSESSMENT
PAIN_FUNCTIONAL_ASSESSMENT: ACTIVITIES ARE NOT PREVENTED
PAIN_FUNCTIONAL_ASSESSMENT: 0-10

## 2025-03-02 ASSESSMENT — PAIN DESCRIPTION - FREQUENCY: FREQUENCY: CONTINUOUS

## 2025-03-02 NOTE — DISCHARGE INSTRUCTIONS
Complete antibiotics  Come back to the emerged part developing fevers of 100.4 degrees or higher  Call Dr. Barrett tomorrow for an appointment

## 2025-03-02 NOTE — ED PROVIDER NOTES
Eosinophils % 1 0 - 6 %    Basophils % 1 0.0 - 2.0 %    Immature Granulocytes % 1 0.0 - 5.0 %    Neutrophils Absolute 7.19 1.80 - 7.30 k/uL    Lymphocytes Absolute 1.49 (L) 1.50 - 4.00 k/uL    Monocytes Absolute 0.85 0.10 - 0.95 k/uL    Eosinophils Absolute 0.14 0.05 - 0.50 k/uL    Basophils Absolute 0.07 0.00 - 0.20 k/uL    Immature Granulocytes Absolute 0.05 0.00 - 0.58 k/uL   Comprehensive Metabolic Panel   Result Value Ref Range    Sodium 138 132 - 146 mmol/L    Potassium 4.0 3.5 - 5.0 mmol/L    Chloride 101 98 - 107 mmol/L    CO2 24 22 - 29 mmol/L    Anion Gap 13 7 - 16 mmol/L    Glucose 114 (H) 74 - 99 mg/dL    BUN 13 6 - 23 mg/dL    Creatinine 0.7 0.70 - 1.20 mg/dL    Est, Glom Filt Rate >90 >60 mL/min/1.73m2    Calcium 9.2 8.6 - 10.2 mg/dL    Total Protein 7.2 6.4 - 8.3 g/dL    Albumin 3.8 3.5 - 5.2 g/dL    Total Bilirubin 0.3 0.0 - 1.2 mg/dL    Alkaline Phosphatase 84 40 - 129 U/L    ALT 14 0 - 40 U/L    AST 12 0 - 39 U/L   Urinalysis with Microscopic   Result Value Ref Range    Color, UA Yellow Yellow    Turbidity UA Clear Clear    Glucose, Ur 100 (A) NEGATIVE mg/dL    Bilirubin, Urine SMALL (A) NEGATIVE    Ketones, Urine TRACE (A) NEGATIVE mg/dL    Specific Gravity, UA 1.025 1.005 - 1.030    Urine Hgb MODERATE (A) NEGATIVE    pH, Urine 6.5 5.0 - 8.0    Protein, UA >=300 (A) NEGATIVE mg/dL    Urobilinogen, Urine 1.0 0.0 - 1.0 EU/dL    Nitrite, Urine POSITIVE (A) NEGATIVE    Leukocyte Esterase, Urine MODERATE (A) NEGATIVE    WBC, UA 21 TO 50 (A) 0 TO 5 /HPF    RBC, UA 10 TO 20 (A) 0 TO 2 /HPF    Bacteria, UA 4+ (A) None       Radiology:  CT ABDOMEN PELVIS W IV CONTRAST Additional Contrast? None   Final Result   1. Apposition between the mid sigmoid colon and the roof of the urinary   bladder consistent with the patient's known colonic fascicular fistula.   2. Air and fecal material within the fistula tract versus abscess.   3. Diffuse colonic fecal retention.   4. Cholelithiasis.   5. Small hiatal

## 2025-03-04 LAB
MICROORGANISM SPEC CULT: ABNORMAL
SERVICE CMNT-IMP: ABNORMAL
SPECIMEN DESCRIPTION: ABNORMAL

## 2025-03-15 ENCOUNTER — HOSPITAL ENCOUNTER (INPATIENT)
Age: 65
LOS: 13 days | Discharge: NURSING, REHAB OR LONG-TERM CARE | End: 2025-03-28
Attending: EMERGENCY MEDICINE | Admitting: INTERNAL MEDICINE
Payer: MEDICARE

## 2025-03-15 ENCOUNTER — APPOINTMENT (OUTPATIENT)
Dept: GENERAL RADIOLOGY | Age: 65
End: 2025-03-15
Payer: MEDICARE

## 2025-03-15 ENCOUNTER — APPOINTMENT (OUTPATIENT)
Dept: CT IMAGING | Age: 65
End: 2025-03-15
Payer: MEDICARE

## 2025-03-15 DIAGNOSIS — I47.29 NSVT (NONSUSTAINED VENTRICULAR TACHYCARDIA) (HCC): ICD-10-CM

## 2025-03-15 DIAGNOSIS — I50.9 CONGESTIVE HEART FAILURE, UNSPECIFIED HF CHRONICITY, UNSPECIFIED HEART FAILURE TYPE (HCC): ICD-10-CM

## 2025-03-15 DIAGNOSIS — R06.02 SHORTNESS OF BREATH: ICD-10-CM

## 2025-03-15 DIAGNOSIS — K65.1 PELVIC ABSCESS IN MALE (HCC): Primary | ICD-10-CM

## 2025-03-15 DIAGNOSIS — N32.1 COLOVESICAL FISTULA: ICD-10-CM

## 2025-03-15 LAB
ALBUMIN SERPL-MCNC: 3.9 G/DL (ref 3.5–5.2)
ALP SERPL-CCNC: 74 U/L (ref 40–129)
ALT SERPL-CCNC: 16 U/L (ref 0–40)
ANION GAP SERPL CALCULATED.3IONS-SCNC: 10 MMOL/L (ref 7–16)
AST SERPL-CCNC: 14 U/L (ref 0–39)
BACTERIA URNS QL MICRO: ABNORMAL
BASOPHILS # BLD: 0.04 K/UL (ref 0–0.2)
BASOPHILS NFR BLD: 1 % (ref 0–2)
BILIRUB SERPL-MCNC: 0.4 MG/DL (ref 0–1.2)
BILIRUB UR QL STRIP: ABNORMAL
BNP SERPL-MCNC: 168 PG/ML (ref 0–125)
BUN SERPL-MCNC: 9 MG/DL (ref 6–23)
CALCIUM SERPL-MCNC: 9.4 MG/DL (ref 8.6–10.2)
CHLORIDE SERPL-SCNC: 97 MMOL/L (ref 98–107)
CLARITY UR: CLEAR
CO2 SERPL-SCNC: 28 MMOL/L (ref 22–29)
COLOR UR: YELLOW
CREAT SERPL-MCNC: 0.6 MG/DL (ref 0.7–1.2)
EOSINOPHIL # BLD: 0.03 K/UL (ref 0.05–0.5)
EOSINOPHILS RELATIVE PERCENT: 1 % (ref 0–6)
ERYTHROCYTE [DISTWIDTH] IN BLOOD BY AUTOMATED COUNT: 12.8 % (ref 11.5–15)
GFR, ESTIMATED: >90 ML/MIN/1.73M2
GLUCOSE SERPL-MCNC: 109 MG/DL (ref 74–99)
GLUCOSE UR STRIP-MCNC: 100 MG/DL
HCT VFR BLD AUTO: 40.2 % (ref 37–54)
HGB BLD-MCNC: 13.2 G/DL (ref 12.5–16.5)
HGB UR QL STRIP.AUTO: ABNORMAL
IMM GRANULOCYTES # BLD AUTO: <0.03 K/UL (ref 0–0.58)
IMM GRANULOCYTES NFR BLD: 0 % (ref 0–5)
INFLUENZA A BY PCR: NOT DETECTED
INFLUENZA B BY PCR: NOT DETECTED
KETONES UR STRIP-MCNC: NEGATIVE MG/DL
LACTATE BLDV-SCNC: 0.7 MMOL/L (ref 0.5–2.2)
LEUKOCYTE ESTERASE UR QL STRIP: ABNORMAL
LYMPHOCYTES NFR BLD: 1.15 K/UL (ref 1.5–4)
LYMPHOCYTES RELATIVE PERCENT: 18 % (ref 20–42)
MCH RBC QN AUTO: 30.1 PG (ref 26–35)
MCHC RBC AUTO-ENTMCNC: 32.8 G/DL (ref 32–34.5)
MCV RBC AUTO: 91.6 FL (ref 80–99.9)
MONOCYTES NFR BLD: 0.8 K/UL (ref 0.1–0.95)
MONOCYTES NFR BLD: 12 % (ref 2–12)
NEUTROPHILS NFR BLD: 69 % (ref 43–80)
NEUTS SEG NFR BLD: 4.55 K/UL (ref 1.8–7.3)
NITRITE UR QL STRIP: NEGATIVE
PH UR STRIP: 7.5 [PH] (ref 5–8)
PLATELET # BLD AUTO: 326 K/UL (ref 130–450)
PMV BLD AUTO: 9.1 FL (ref 7–12)
POTASSIUM SERPL-SCNC: 4 MMOL/L (ref 3.5–5)
PROT SERPL-MCNC: 7.4 G/DL (ref 6.4–8.3)
PROT UR STRIP-MCNC: 100 MG/DL
RBC # BLD AUTO: 4.39 M/UL (ref 3.8–5.8)
RBC #/AREA URNS HPF: ABNORMAL /HPF
SARS-COV-2 RDRP RESP QL NAA+PROBE: ABNORMAL
SODIUM SERPL-SCNC: 135 MMOL/L (ref 132–146)
SP GR UR STRIP: 1.02 (ref 1–1.03)
SPECIMEN DESCRIPTION: ABNORMAL
TROPONIN I SERPL HS-MCNC: 7 NG/L (ref 0–11)
UROBILINOGEN UR STRIP-ACNC: 2 EU/DL (ref 0–1)
WBC #/AREA URNS HPF: ABNORMAL /HPF
WBC OTHER # BLD: 6.6 K/UL (ref 4.5–11.5)

## 2025-03-15 PROCEDURE — 1200000000 HC SEMI PRIVATE

## 2025-03-15 PROCEDURE — 2500000003 HC RX 250 WO HCPCS: Performed by: NURSE PRACTITIONER

## 2025-03-15 PROCEDURE — 74177 CT ABD & PELVIS W/CONTRAST: CPT

## 2025-03-15 PROCEDURE — 6360000002 HC RX W HCPCS: Performed by: NURSE PRACTITIONER

## 2025-03-15 PROCEDURE — 87502 INFLUENZA DNA AMP PROBE: CPT

## 2025-03-15 PROCEDURE — 6370000000 HC RX 637 (ALT 250 FOR IP): Performed by: NURSE PRACTITIONER

## 2025-03-15 PROCEDURE — 87040 BLOOD CULTURE FOR BACTERIA: CPT

## 2025-03-15 PROCEDURE — 83605 ASSAY OF LACTIC ACID: CPT

## 2025-03-15 PROCEDURE — 83880 ASSAY OF NATRIURETIC PEPTIDE: CPT

## 2025-03-15 PROCEDURE — 84484 ASSAY OF TROPONIN QUANT: CPT

## 2025-03-15 PROCEDURE — 93005 ELECTROCARDIOGRAM TRACING: CPT | Performed by: EMERGENCY MEDICINE

## 2025-03-15 PROCEDURE — 80053 COMPREHEN METABOLIC PANEL: CPT

## 2025-03-15 PROCEDURE — 6360000002 HC RX W HCPCS: Performed by: INTERNAL MEDICINE

## 2025-03-15 PROCEDURE — 81001 URINALYSIS AUTO W/SCOPE: CPT

## 2025-03-15 PROCEDURE — 87635 SARS-COV-2 COVID-19 AMP PRB: CPT

## 2025-03-15 PROCEDURE — 94640 AIRWAY INHALATION TREATMENT: CPT

## 2025-03-15 PROCEDURE — 71046 X-RAY EXAM CHEST 2 VIEWS: CPT

## 2025-03-15 PROCEDURE — 2580000003 HC RX 258: Performed by: EMERGENCY MEDICINE

## 2025-03-15 PROCEDURE — 85025 COMPLETE CBC W/AUTO DIFF WBC: CPT

## 2025-03-15 PROCEDURE — 87086 URINE CULTURE/COLONY COUNT: CPT

## 2025-03-15 PROCEDURE — 6360000002 HC RX W HCPCS: Performed by: EMERGENCY MEDICINE

## 2025-03-15 PROCEDURE — 99285 EMERGENCY DEPT VISIT HI MDM: CPT

## 2025-03-15 PROCEDURE — 6360000004 HC RX CONTRAST MEDICATION: Performed by: RADIOLOGY

## 2025-03-15 RX ORDER — SODIUM CHLORIDE 9 MG/ML
INJECTION, SOLUTION INTRAVENOUS PRN
Status: DISCONTINUED | OUTPATIENT
Start: 2025-03-15 | End: 2025-03-28 | Stop reason: HOSPADM

## 2025-03-15 RX ORDER — POTASSIUM CHLORIDE 7.45 MG/ML
10 INJECTION INTRAVENOUS PRN
Status: DISCONTINUED | OUTPATIENT
Start: 2025-03-15 | End: 2025-03-28 | Stop reason: HOSPADM

## 2025-03-15 RX ORDER — SODIUM CHLORIDE 0.9 % (FLUSH) 0.9 %
10 SYRINGE (ML) INJECTION EVERY 12 HOURS SCHEDULED
Status: DISCONTINUED | OUTPATIENT
Start: 2025-03-15 | End: 2025-03-28 | Stop reason: HOSPADM

## 2025-03-15 RX ORDER — ALBUTEROL SULFATE 0.83 MG/ML
2.5 SOLUTION RESPIRATORY (INHALATION) EVERY 4 HOURS PRN
Status: DISCONTINUED | OUTPATIENT
Start: 2025-03-15 | End: 2025-03-28 | Stop reason: HOSPADM

## 2025-03-15 RX ORDER — ONDANSETRON 2 MG/ML
4 INJECTION INTRAMUSCULAR; INTRAVENOUS ONCE
Status: COMPLETED | OUTPATIENT
Start: 2025-03-15 | End: 2025-03-15

## 2025-03-15 RX ORDER — ACETAMINOPHEN 650 MG/1
650 SUPPOSITORY RECTAL EVERY 6 HOURS PRN
Status: DISCONTINUED | OUTPATIENT
Start: 2025-03-15 | End: 2025-03-19

## 2025-03-15 RX ORDER — MIRTAZAPINE 15 MG/1
30 TABLET, FILM COATED ORAL NIGHTLY
Status: DISCONTINUED | OUTPATIENT
Start: 2025-03-15 | End: 2025-03-28 | Stop reason: HOSPADM

## 2025-03-15 RX ORDER — ARFORMOTEROL TARTRATE 15 UG/2ML
15 SOLUTION RESPIRATORY (INHALATION)
Status: DISCONTINUED | OUTPATIENT
Start: 2025-03-16 | End: 2025-03-28 | Stop reason: HOSPADM

## 2025-03-15 RX ORDER — ACETAMINOPHEN 325 MG/1
650 TABLET ORAL EVERY 6 HOURS PRN
Status: DISCONTINUED | OUTPATIENT
Start: 2025-03-15 | End: 2025-03-19

## 2025-03-15 RX ORDER — IOPAMIDOL 755 MG/ML
75 INJECTION, SOLUTION INTRAVASCULAR
Status: COMPLETED | OUTPATIENT
Start: 2025-03-15 | End: 2025-03-15

## 2025-03-15 RX ORDER — BUPRENORPHINE HYDROCHLORIDE AND NALOXONE HYDROCHLORIDE DIHYDRATE 8; 2 MG/1; MG/1
1 TABLET SUBLINGUAL DAILY
Status: DISCONTINUED | OUTPATIENT
Start: 2025-03-16 | End: 2025-03-17

## 2025-03-15 RX ORDER — MORPHINE SULFATE 2 MG/ML
1 INJECTION, SOLUTION INTRAMUSCULAR; INTRAVENOUS EVERY 4 HOURS PRN
Status: DISCONTINUED | OUTPATIENT
Start: 2025-03-15 | End: 2025-03-16

## 2025-03-15 RX ORDER — TAMSULOSIN HYDROCHLORIDE 0.4 MG/1
0.4 CAPSULE ORAL DAILY
Status: DISCONTINUED | OUTPATIENT
Start: 2025-03-16 | End: 2025-03-28 | Stop reason: HOSPADM

## 2025-03-15 RX ORDER — ONDANSETRON 2 MG/ML
4 INJECTION INTRAMUSCULAR; INTRAVENOUS EVERY 6 HOURS PRN
Status: DISCONTINUED | OUTPATIENT
Start: 2025-03-15 | End: 2025-03-28 | Stop reason: HOSPADM

## 2025-03-15 RX ORDER — ONDANSETRON 4 MG/1
4 TABLET, ORALLY DISINTEGRATING ORAL EVERY 8 HOURS PRN
Status: DISCONTINUED | OUTPATIENT
Start: 2025-03-15 | End: 2025-03-28 | Stop reason: HOSPADM

## 2025-03-15 RX ORDER — FLUTICASONE PROPIONATE 50 MCG
1 SPRAY, SUSPENSION (ML) NASAL 2 TIMES DAILY PRN
Status: DISCONTINUED | OUTPATIENT
Start: 2025-03-15 | End: 2025-03-28 | Stop reason: HOSPADM

## 2025-03-15 RX ORDER — POTASSIUM CHLORIDE 1500 MG/1
40 TABLET, EXTENDED RELEASE ORAL PRN
Status: DISCONTINUED | OUTPATIENT
Start: 2025-03-15 | End: 2025-03-28 | Stop reason: HOSPADM

## 2025-03-15 RX ORDER — SENNOSIDES A AND B 8.6 MG/1
1 TABLET, FILM COATED ORAL DAILY PRN
Status: DISCONTINUED | OUTPATIENT
Start: 2025-03-15 | End: 2025-03-19

## 2025-03-15 RX ORDER — MAGNESIUM SULFATE IN WATER 40 MG/ML
2000 INJECTION, SOLUTION INTRAVENOUS PRN
Status: DISCONTINUED | OUTPATIENT
Start: 2025-03-15 | End: 2025-03-28 | Stop reason: HOSPADM

## 2025-03-15 RX ORDER — SODIUM CHLORIDE 0.9 % (FLUSH) 0.9 %
10 SYRINGE (ML) INJECTION PRN
Status: DISCONTINUED | OUTPATIENT
Start: 2025-03-15 | End: 2025-03-28 | Stop reason: HOSPADM

## 2025-03-15 RX ORDER — TRAZODONE HYDROCHLORIDE 50 MG/1
100 TABLET ORAL NIGHTLY PRN
Status: DISCONTINUED | OUTPATIENT
Start: 2025-03-15 | End: 2025-03-28 | Stop reason: HOSPADM

## 2025-03-15 RX ORDER — ARFORMOTEROL TARTRATE 15 UG/2ML
15 SOLUTION RESPIRATORY (INHALATION)
Status: DISCONTINUED | OUTPATIENT
Start: 2025-03-15 | End: 2025-03-15

## 2025-03-15 RX ADMIN — MORPHINE SULFATE 1 MG: 2 INJECTION, SOLUTION INTRAMUSCULAR; INTRAVENOUS at 23:43

## 2025-03-15 RX ADMIN — PIPERACILLIN AND TAZOBACTAM 4500 MG: 4; .5 INJECTION, POWDER, FOR SOLUTION INTRAVENOUS at 19:56

## 2025-03-15 RX ADMIN — ARFORMOTEROL TARTRATE 15 MCG: 15 SOLUTION RESPIRATORY (INHALATION) at 22:27

## 2025-03-15 RX ADMIN — ONDANSETRON 4 MG: 2 INJECTION, SOLUTION INTRAMUSCULAR; INTRAVENOUS at 19:55

## 2025-03-15 RX ADMIN — IOPAMIDOL 75 ML: 755 INJECTION, SOLUTION INTRAVENOUS at 17:18

## 2025-03-15 RX ADMIN — SODIUM CHLORIDE, PRESERVATIVE FREE 10 ML: 5 INJECTION INTRAVENOUS at 21:59

## 2025-03-15 RX ADMIN — IPRATROPIUM BROMIDE 0.5 MG: 0.5 SOLUTION RESPIRATORY (INHALATION) at 22:27

## 2025-03-15 RX ADMIN — MIRTAZAPINE 30 MG: 15 TABLET, FILM COATED ORAL at 23:03

## 2025-03-15 ASSESSMENT — PAIN DESCRIPTION - DESCRIPTORS
DESCRIPTORS: TENDER
DESCRIPTORS: ACHING;SHARP;DISCOMFORT
DESCRIPTORS: SHARP

## 2025-03-15 ASSESSMENT — PAIN DESCRIPTION - ORIENTATION
ORIENTATION: LOWER;RIGHT
ORIENTATION: LOWER;MID
ORIENTATION: LOWER

## 2025-03-15 ASSESSMENT — PAIN DESCRIPTION - LOCATION
LOCATION: BACK;PELVIS
LOCATION: ABDOMEN;PELVIS
LOCATION: ABDOMEN

## 2025-03-15 ASSESSMENT — PAIN DESCRIPTION - FREQUENCY: FREQUENCY: CONTINUOUS

## 2025-03-15 ASSESSMENT — PAIN DESCRIPTION - ONSET: ONSET: ON-GOING

## 2025-03-15 ASSESSMENT — PAIN DESCRIPTION - PAIN TYPE: TYPE: ACUTE PAIN

## 2025-03-15 ASSESSMENT — PAIN SCALES - GENERAL
PAINLEVEL_OUTOF10: 8
PAINLEVEL_OUTOF10: 8
PAINLEVEL_OUTOF10: 7

## 2025-03-15 ASSESSMENT — PAIN - FUNCTIONAL ASSESSMENT
PAIN_FUNCTIONAL_ASSESSMENT: PREVENTS OR INTERFERES SOME ACTIVE ACTIVITIES AND ADLS
PAIN_FUNCTIONAL_ASSESSMENT: PREVENTS OR INTERFERES SOME ACTIVE ACTIVITIES AND ADLS
PAIN_FUNCTIONAL_ASSESSMENT: 0-10

## 2025-03-15 NOTE — ED TRIAGE NOTES
FIRST PROVIDER CONTACT ASSESSMENT NOTE       Department of Emergency Medicine                 First Provider Note            3/15/25  1:48 PM EDT    Date of Encounter: No admission date for patient encounter.    Patient Name: Yevgeniy Johnson  : 1960  MRN: [id number]    Chief Complaint: Constipation (X3 days), Shortness of Breath, and Nasal Congestion      History of Present Illness:   Yevgeniy Johnson is a 64 y.o. male who presents to the ED for constipation and SOB.   Lower abdominal pain.    Also SOB.    Was seen here recently as well     Focused Physical Exam:  VS:    ED Triage Vitals   BP Systolic BP Percentile Diastolic BP Percentile Temp Temp src Pulse Resp SpO2   -- -- -- -- -- -- -- --      Height Weight         -- --              Physical Ex: Constitutional: Alert and non-toxic.    Medical History:  has a past medical history of COPD (chronic obstructive pulmonary disease) (HCC) and Hypertension.  Surgical History:  has a past surgical history that includes lumbar laminectomy and knee surgery (Right).  Social History:  reports that he has been smoking cigarettes. He has never used smokeless tobacco.  Family History: family history is not on file.    Allergies: Citalopram, Gabapentin, and Ketorolac tromethamine     Initial Plan of Care: Initiate Treatment-Testing, Proceed toTreatment Area When Bed Available for ED Attending/MLP to Continue Care      ---END OF FIRST PROVIDER CONTACT ASSESSMENT NOTE---  Electronically signed by Debra Bean PA-C   DD: 3/15/25

## 2025-03-15 NOTE — ED PROVIDER NOTES
HPI:  3/15/25, Time: 2:42 PM EDT         Yevgeniy Johnson is a 64 y.o. male presenting to the ED for abdominal pain which has been an ongoing issue but worsened recently.  Patient states that he is a known colovesical fistula, and he is scheduled to undergo surgery with Dr. Barrett on April 14.  He states that he cannot make it to the surgery.  He states that he has been having constipation over the last few days but he does not want to take anything because it comes out of his penis.  He also reports a decreased appetite but no nausea or emesis.  No documented fevers.  Patient also reports congestion in his chest at night.  He states he has a history of sleep apnea but cannot tolerate the CPAP.  Denies any chest pain.  Has a chronic cough which is unchanged.    Review of Systems:  Pertinent positives and negatives as per HPI.    --------------------------------------------- PAST HISTORY ---------------------------------------------  Past Medical History:  has a past medical history of COPD (chronic obstructive pulmonary disease) (HCC) and Hypertension.    Past Surgical History:  has a past surgical history that includes lumbar laminectomy and knee surgery (Right).    Social History:  reports that he has been smoking cigarettes. He has never used smokeless tobacco.    Family History: family history is not on file.     The patient’s home medications have been reviewed.    Allergies: Citalopram, Gabapentin, and Ketorolac tromethamine    -------------------------------------------------- RESULTS -------------------------------------------------  All laboratory and radiology results have been personally reviewed by myself   LABS:  Results for orders placed or performed during the hospital encounter of 03/15/25   COVID-19, Rapid    Specimen: Nasopharyngeal Swab   Result Value Ref Range    Specimen Description .NASOPHARYNGEAL SWAB     SARS-CoV-2, Rapid Indeterminate (A) Not Detected   Influenza A+B, PCR    Specimen:  Normal      ---------------------------------------------------PHYSICAL EXAM--------------------------------------      Constitutional/General: Alert and oriented x3, appears uncomfortable, non toxic in NAD  Head: Normocephalic and atraumatic  Mouth: Oropharynx clear, handling secretions, no trismus  Neck: Supple, full ROM,   Pulmonary: Lungs clear to auscultation bilaterally, no wheezes, rales, or rhonchi. Not in respiratory distress  Cardiovascular:  Regular rate and rhythm.   Abdomen: Soft, suprapubic tenderness, no rebound, no guarding, non distended  Extremities: Moves all extremities x 4. Warm and well perfused  Skin: warm and dry without rash  Neurologic: GCS 15, no focal motor or sensory deficits   Psych: Normal Affect. Behavior normal.      ------------------------------ ED COURSE/MEDICAL DECISION MAKING----------------------  Medications   piperacillin-tazobactam (ZOSYN) 4,500 mg in sodium chloride 0.9 % 100 mL IVPB (vial-mate) (4,500 mg IntraVENous New Bag 3/15/25 1956)   iopamidol (ISOVUE-370) 76 % injection 75 mL (75 mLs IntraVENous Given 3/15/25 1718)   ondansetron (ZOFRAN) injection 4 mg (4 mg IntraVENous Given 3/15/25 1955)       Medical Decision Making/ED COURSE:     Patient is a 64 y.o. male presenting to the ED for acute on chronic onset abdominal pain and chest congestion, moderate in severity. In the ED, patient was hemodynamically stable and afebrile.  No hypoxia.  Labs and imaging obtained. Differential diagnosis includes bowel obstruction, intra-abdominal infection, colitis, electrolyte abnormality, pneumonia.     I reviewed and interpreted labs.  CBC reassuring with no leukocytosis or anemia.  CMP showed mild hypochloremia of 97 but otherwise reassuring with normal kidney function.  No lactic acidosis.  Cardiac enzymes negative.  .  No acute ischemic changes on EKG.  ACS not suspected.  Influenza swab negative.  COVID-19 swab indeterminate.  Blood culture sent.    Chest xray

## 2025-03-16 LAB
ALBUMIN SERPL-MCNC: 3.5 G/DL (ref 3.5–5.2)
ALP SERPL-CCNC: 68 U/L (ref 40–129)
ALT SERPL-CCNC: 15 U/L (ref 0–40)
ANION GAP SERPL CALCULATED.3IONS-SCNC: 12 MMOL/L (ref 7–16)
AST SERPL-CCNC: 12 U/L (ref 0–39)
B PARAP IS1001 DNA NPH QL NAA+NON-PROBE: NOT DETECTED
B PERT DNA SPEC QL NAA+PROBE: NOT DETECTED
BASOPHILS # BLD: 0.05 K/UL (ref 0–0.2)
BASOPHILS NFR BLD: 1 % (ref 0–2)
BILIRUB SERPL-MCNC: 0.4 MG/DL (ref 0–1.2)
BUN SERPL-MCNC: 8 MG/DL (ref 6–23)
C PNEUM DNA NPH QL NAA+NON-PROBE: NOT DETECTED
CALCIUM SERPL-MCNC: 9.2 MG/DL (ref 8.6–10.2)
CHLORIDE SERPL-SCNC: 102 MMOL/L (ref 98–107)
CO2 SERPL-SCNC: 25 MMOL/L (ref 22–29)
CREAT SERPL-MCNC: 0.6 MG/DL (ref 0.7–1.2)
EOSINOPHIL # BLD: 0.11 K/UL (ref 0.05–0.5)
EOSINOPHILS RELATIVE PERCENT: 2 % (ref 0–6)
ERYTHROCYTE [DISTWIDTH] IN BLOOD BY AUTOMATED COUNT: 12.8 % (ref 11.5–15)
FLUAV RNA NPH QL NAA+NON-PROBE: NOT DETECTED
FLUBV RNA NPH QL NAA+NON-PROBE: NOT DETECTED
GFR, ESTIMATED: >90 ML/MIN/1.73M2
GLUCOSE SERPL-MCNC: 92 MG/DL (ref 74–99)
HADV DNA NPH QL NAA+NON-PROBE: NOT DETECTED
HCOV 229E RNA NPH QL NAA+NON-PROBE: NOT DETECTED
HCOV HKU1 RNA NPH QL NAA+NON-PROBE: NOT DETECTED
HCOV NL63 RNA NPH QL NAA+NON-PROBE: NOT DETECTED
HCOV OC43 RNA NPH QL NAA+NON-PROBE: NOT DETECTED
HCT VFR BLD AUTO: 38.3 % (ref 37–54)
HGB BLD-MCNC: 12.6 G/DL (ref 12.5–16.5)
HMPV RNA NPH QL NAA+NON-PROBE: NOT DETECTED
HPIV1 RNA NPH QL NAA+NON-PROBE: NOT DETECTED
HPIV2 RNA NPH QL NAA+NON-PROBE: NOT DETECTED
HPIV3 RNA NPH QL NAA+NON-PROBE: NOT DETECTED
HPIV4 RNA NPH QL NAA+NON-PROBE: NOT DETECTED
IMM GRANULOCYTES # BLD AUTO: <0.03 K/UL (ref 0–0.58)
IMM GRANULOCYTES NFR BLD: 0 % (ref 0–5)
LYMPHOCYTES NFR BLD: 1.59 K/UL (ref 1.5–4)
LYMPHOCYTES RELATIVE PERCENT: 25 % (ref 20–42)
M PNEUMO DNA NPH QL NAA+NON-PROBE: NOT DETECTED
MCH RBC QN AUTO: 29.7 PG (ref 26–35)
MCHC RBC AUTO-ENTMCNC: 32.9 G/DL (ref 32–34.5)
MCV RBC AUTO: 90.3 FL (ref 80–99.9)
MONOCYTES NFR BLD: 0.89 K/UL (ref 0.1–0.95)
MONOCYTES NFR BLD: 14 % (ref 2–12)
NEUTROPHILS NFR BLD: 58 % (ref 43–80)
NEUTS SEG NFR BLD: 3.62 K/UL (ref 1.8–7.3)
PLATELET # BLD AUTO: 308 K/UL (ref 130–450)
PMV BLD AUTO: 8.9 FL (ref 7–12)
POTASSIUM SERPL-SCNC: 4 MMOL/L (ref 3.5–5)
PROT SERPL-MCNC: 6.8 G/DL (ref 6.4–8.3)
RBC # BLD AUTO: 4.24 M/UL (ref 3.8–5.8)
RSV RNA NPH QL NAA+NON-PROBE: NOT DETECTED
RV+EV RNA NPH QL NAA+NON-PROBE: NOT DETECTED
SARS-COV-2 RNA NPH QL NAA+NON-PROBE: NOT DETECTED
SODIUM SERPL-SCNC: 139 MMOL/L (ref 132–146)
SPECIMEN DESCRIPTION: NORMAL
WBC OTHER # BLD: 6.3 K/UL (ref 4.5–11.5)

## 2025-03-16 PROCEDURE — 6370000000 HC RX 637 (ALT 250 FOR IP): Performed by: INTERNAL MEDICINE

## 2025-03-16 PROCEDURE — 0202U NFCT DS 22 TRGT SARS-COV-2: CPT

## 2025-03-16 PROCEDURE — 1200000000 HC SEMI PRIVATE

## 2025-03-16 PROCEDURE — 94640 AIRWAY INHALATION TREATMENT: CPT

## 2025-03-16 PROCEDURE — 6370000000 HC RX 637 (ALT 250 FOR IP): Performed by: SURGERY

## 2025-03-16 PROCEDURE — 6370000000 HC RX 637 (ALT 250 FOR IP): Performed by: NURSE PRACTITIONER

## 2025-03-16 PROCEDURE — 6360000002 HC RX W HCPCS: Performed by: INTERNAL MEDICINE

## 2025-03-16 PROCEDURE — 85025 COMPLETE CBC W/AUTO DIFF WBC: CPT

## 2025-03-16 PROCEDURE — 6360000002 HC RX W HCPCS: Performed by: SURGERY

## 2025-03-16 PROCEDURE — 2580000003 HC RX 258: Performed by: INTERNAL MEDICINE

## 2025-03-16 PROCEDURE — 2500000003 HC RX 250 WO HCPCS: Performed by: NURSE PRACTITIONER

## 2025-03-16 PROCEDURE — 80053 COMPREHEN METABOLIC PANEL: CPT

## 2025-03-16 PROCEDURE — 6360000002 HC RX W HCPCS: Performed by: NURSE PRACTITIONER

## 2025-03-16 RX ORDER — BISACODYL 10 MG
10 SUPPOSITORY, RECTAL RECTAL ONCE
Status: DISCONTINUED | OUTPATIENT
Start: 2025-03-16 | End: 2025-03-19

## 2025-03-16 RX ORDER — MORPHINE SULFATE 2 MG/ML
2 INJECTION, SOLUTION INTRAMUSCULAR; INTRAVENOUS
Status: DISCONTINUED | OUTPATIENT
Start: 2025-03-16 | End: 2025-03-19

## 2025-03-16 RX ORDER — DOCUSATE SODIUM 100 MG/1
100 CAPSULE, LIQUID FILLED ORAL 2 TIMES DAILY
Status: DISCONTINUED | OUTPATIENT
Start: 2025-03-16 | End: 2025-03-19

## 2025-03-16 RX ORDER — SODIUM CHLORIDE 9 MG/ML
10 INJECTION, SOLUTION INTRAMUSCULAR; INTRAVENOUS; SUBCUTANEOUS EVERY 6 HOURS
Status: DISCONTINUED | OUTPATIENT
Start: 2025-03-16 | End: 2025-03-19

## 2025-03-16 RX ORDER — PIPERACILLIN SODIUM, TAZOBACTAM SODIUM 3; .375 G/15ML; G/15ML
3375 INJECTION, POWDER, LYOPHILIZED, FOR SOLUTION INTRAVENOUS EVERY 6 HOURS
Status: DISCONTINUED | OUTPATIENT
Start: 2025-03-16 | End: 2025-03-19

## 2025-03-16 RX ADMIN — ONDANSETRON 4 MG: 2 INJECTION INTRAMUSCULAR; INTRAVENOUS at 15:24

## 2025-03-16 RX ADMIN — IPRATROPIUM BROMIDE 0.5 MG: 0.5 SOLUTION RESPIRATORY (INHALATION) at 08:04

## 2025-03-16 RX ADMIN — ARFORMOTEROL TARTRATE 15 MCG: 15 SOLUTION RESPIRATORY (INHALATION) at 08:04

## 2025-03-16 RX ADMIN — SODIUM CHLORIDE, PRESERVATIVE FREE 10 ML: 5 INJECTION INTRAVENOUS at 17:58

## 2025-03-16 RX ADMIN — SODIUM CHLORIDE, PRESERVATIVE FREE 10 ML: 5 INJECTION INTRAVENOUS at 20:38

## 2025-03-16 RX ADMIN — DOCUSATE SODIUM 100 MG: 100 CAPSULE, LIQUID FILLED ORAL at 20:38

## 2025-03-16 RX ADMIN — MORPHINE SULFATE 2 MG: 2 INJECTION, SOLUTION INTRAMUSCULAR; INTRAVENOUS at 20:38

## 2025-03-16 RX ADMIN — SODIUM CHLORIDE 10 ML: 9 INJECTION INTRAMUSCULAR; INTRAVENOUS; SUBCUTANEOUS at 17:58

## 2025-03-16 RX ADMIN — SODIUM CHLORIDE, PRESERVATIVE FREE 10 ML: 5 INJECTION INTRAVENOUS at 15:24

## 2025-03-16 RX ADMIN — SENNOSIDES 8.6 MG: 8.6 TABLET, COATED ORAL at 15:30

## 2025-03-16 RX ADMIN — MORPHINE SULFATE 1 MG: 2 INJECTION, SOLUTION INTRAMUSCULAR; INTRAVENOUS at 05:03

## 2025-03-16 RX ADMIN — IPRATROPIUM BROMIDE 0.5 MG: 0.5 SOLUTION RESPIRATORY (INHALATION) at 20:42

## 2025-03-16 RX ADMIN — MAGNESIUM HYDROXIDE 60 ML: 400 SUSPENSION ORAL at 10:17

## 2025-03-16 RX ADMIN — PIPERACILLIN AND TAZOBACTAM 3375 MG: 3; .375 INJECTION, POWDER, LYOPHILIZED, FOR SOLUTION INTRAVENOUS at 11:36

## 2025-03-16 RX ADMIN — SODIUM CHLORIDE, PRESERVATIVE FREE 10 ML: 5 INJECTION INTRAVENOUS at 11:37

## 2025-03-16 RX ADMIN — ARFORMOTEROL TARTRATE 15 MCG: 15 SOLUTION RESPIRATORY (INHALATION) at 20:42

## 2025-03-16 RX ADMIN — DOCUSATE SODIUM 100 MG: 100 CAPSULE, LIQUID FILLED ORAL at 10:18

## 2025-03-16 RX ADMIN — MORPHINE SULFATE 2 MG: 2 INJECTION, SOLUTION INTRAMUSCULAR; INTRAVENOUS at 15:23

## 2025-03-16 RX ADMIN — SODIUM CHLORIDE 10 ML: 9 INJECTION INTRAMUSCULAR; INTRAVENOUS; SUBCUTANEOUS at 11:36

## 2025-03-16 RX ADMIN — BUPRENORPHINE HYDROCHLORIDE AND NALOXONE HYDROCHLORIDE DIHYDRATE 1 TABLET: 8; 2 TABLET SUBLINGUAL at 08:54

## 2025-03-16 RX ADMIN — SODIUM CHLORIDE, PRESERVATIVE FREE 10 ML: 5 INJECTION INTRAVENOUS at 08:55

## 2025-03-16 RX ADMIN — TAMSULOSIN HYDROCHLORIDE 0.4 MG: 0.4 CAPSULE ORAL at 08:54

## 2025-03-16 RX ADMIN — FLUTICASONE PROPIONATE 1 SPRAY: 50 SPRAY, METERED NASAL at 10:03

## 2025-03-16 RX ADMIN — MIRTAZAPINE 30 MG: 15 TABLET, FILM COATED ORAL at 20:39

## 2025-03-16 RX ADMIN — PIPERACILLIN AND TAZOBACTAM 3375 MG: 3; .375 INJECTION, POWDER, LYOPHILIZED, FOR SOLUTION INTRAVENOUS at 17:58

## 2025-03-16 ASSESSMENT — PAIN DESCRIPTION - PAIN TYPE: TYPE: ACUTE PAIN

## 2025-03-16 ASSESSMENT — PAIN DESCRIPTION - DESCRIPTORS
DESCRIPTORS: BURNING;TENDER
DESCRIPTORS: ACHING;BURNING;SORE
DESCRIPTORS: ACHING;SHARP;CRAMPING;DISCOMFORT

## 2025-03-16 ASSESSMENT — PAIN DESCRIPTION - ORIENTATION
ORIENTATION: MID;LOWER
ORIENTATION: RIGHT;LEFT;LOWER

## 2025-03-16 ASSESSMENT — PAIN DESCRIPTION - FREQUENCY: FREQUENCY: CONTINUOUS

## 2025-03-16 ASSESSMENT — PAIN DESCRIPTION - ONSET: ONSET: ON-GOING

## 2025-03-16 ASSESSMENT — PAIN - FUNCTIONAL ASSESSMENT
PAIN_FUNCTIONAL_ASSESSMENT: PREVENTS OR INTERFERES SOME ACTIVE ACTIVITIES AND ADLS
PAIN_FUNCTIONAL_ASSESSMENT: ACTIVITIES ARE NOT PREVENTED

## 2025-03-16 ASSESSMENT — PAIN DESCRIPTION - LOCATION
LOCATION: ABDOMEN;GROIN;PENIS
LOCATION: ABDOMEN;LEG
LOCATION: LEG;ABDOMEN;BACK
LOCATION: PERINEUM;PENIS

## 2025-03-16 ASSESSMENT — PAIN SCALES - GENERAL
PAINLEVEL_OUTOF10: 7
PAINLEVEL_OUTOF10: 9
PAINLEVEL_OUTOF10: 7
PAINLEVEL_OUTOF10: 8
PAINLEVEL_OUTOF10: 7

## 2025-03-16 NOTE — CONSULTS
Infectious Disease Consult Note     Admit Date: 3/15/2025  1:52 PM    Chief complaint: Suprapubic pain    Reason for Consult: COVID 19 positive    Requesting Physician:  Tyrell Palm DO      HISTORY OF PRESENT ILLNESS:    Patient is a 64 year old male who presented to ER due to increase abdominal pain and increased fecaluria. Patient has a known colovesicular fistula. He is scheduled to have surgery next month. He states that he could not wait. He states that he has not had a bowel movement normally for 4 days. In ER patient had CT scan that showed extraluminal gas and fluid collection in the pre vesicle spare concerning for abscess. Patient was given zosyn and admitted for further management. Patient this am having increased discomfort. He is declining external catheter. He is requesting increased pain medications. He admits to fevers and chills. He was found to be indeterminate to covid-19. He denies any cough or shortness of breath.  Patient is well vaccinated for COVID including booster infectious disease now consulted for further recommendation    REVIEW OF SYSTEMS:    Constitutional:no Fever, chills or rigors. No unexplained weight loss.  HEENT: no headache, dizziness or lightheadedness. No sore throat or runny nose.  Respiratory: no cough, chest pain, shortness of breath or wheeze.  Cardiovascular: no chest pain or palpitations  Gastrointestinal: no nausea, vomiting, diarrhea, constipation. No blood in stool.  Genitourinary: no dysuria, hematuria, urgency, frequency or incontinence.  Musculoskeletal: no joint pain anywhere in body, or bodyache.  Neurologic: no h/o passing out, focal weakness or seizure.  Skin: no h/o rashes or easy bruising.  Hematologic: no gum bleeding or easy bruising. No hemoptysis.    MEDICAL HISTORY  Past Medical History:   Diagnosis Date    COPD (chronic obstructive pulmonary disease) (HCC)     Hypertension       Immunization History   Administered Date(s) Administered     checked and tightened 03/16/25 0856   Phlebitis Assessment No symptoms 03/16/25 0856   Infiltration Assessment 0 03/16/25 0856   Alcohol Cap Used Yes 03/16/25 0856   Dressing Status Clean, dry & intact 03/16/25 0856   Dressing Type Transparent 03/16/25 0856       Peripheral IV 03/15/25 Right Antecubital (Active)   Site Assessment Clean, dry & intact 03/16/25 0856   Line Status Capped;Flushed;Normal saline locked 03/16/25 0856   Line Care Cap changed;Connections checked and tightened 03/16/25 0856   Phlebitis Assessment No symptoms 03/16/25 0856   Infiltration Assessment 0 03/16/25 0856   Alcohol Cap Used Yes 03/16/25 0856   Dressing Status Clean, dry & intact 03/16/25 0856   Dressing Type Transparent 03/16/25 0856     Arterial Line:     Hemodialysis Catheter:     Drain(s):  Urinary Catheter 03/16/25 2 Way (Active)   Catheter Indications Perioperative use for selected surgical procedures 03/16/25 1040   Site Assessment [place] 03/16/25 1040   Urine Color Tea 03/16/25 1040   Urine Appearance Other (Comment) 03/16/25 1040   Urine Odor Malodorous 03/16/25 1040   Collection Container Standard 03/16/25 1040   Securement Method Securing device (Describe) 03/16/25 1040   Catheter Care  Soap and water 03/16/25 1040   Catheter Best Practices  Drainage tube clipped to bed;Catheter secured to thigh;Tamper seal intact;Bag below bladder;Bag not on floor;Lack of dependent loop in tubing;Drainage bag less than half full 03/16/25 1040   Status Draining 03/16/25 1040     Airway:       LABS AND DATA REVIEW:     CT ABDOMEN PELVIS W IV CONTRAST Additional Contrast? None  Result Date: 3/15/2025  1. There is a persistent extraluminal gas and fluid collection in the pre vesicle space concerning for an abscess. This has increased in size when compared the previous study. 2. There is a colovesical fistula that appears to communicate with the sigmoid lumen. 3. There is diffuse mucosal thickening of the bladder concerning for cystitis. 4.

## 2025-03-16 NOTE — PROGRESS NOTES
Internal Medicine Progress Note    Patient's name: Yevgeniy Johnson  : 1960  Chief complaints (on day of admission): Constipation (X3 days), Shortness of Breath, Nasal Congestion, and Abdominal Pain (Mid lower abd pain )  Admission date: 3/15/2025  Date of service: 3/17/2025   Room: [address] MED SURG/TELE  Primary care physician: Tanvir Levy MD  Reason for visit: Follow-up for colovesical fistula/diverticular abscess    Subjective  Yevgeniy is seen lying in bed awake and alert, seems tired. He reports ongoing pain in his abdomen. Still isn't able to move his bowels. He denies any nausea or vomiting. Denies any shortness of breath or difficulty breathing. He was made NPO pending possible OR -- not on schedule at this time, nursing is awaiting a call back from surgery for further plans. No other issues or concerns from nursing.    Review of Systems  Full 10 point review of systems negative unless mentioned above.    Hospital Medications  Current Facility-Administered Medications   Medication Dose Route Frequency Provider Last Rate Last Admin    [START ON 3/18/2025] buprenorphine-naloxone (SUBOXONE) 8-2 MG SL tablet 2 tablet  2 tablet SubLINGual Daily Sofia Stone APRN - CNP        [START ON 3/18/2025] buprenorphine-naloxone (SUBOXONE) 8-2 MG SL tablet 1 tablet  1 tablet SubLINGual QHS Sofia Stone APRN - CNP        buprenorphine-naloxone (SUBOXONE) 8-2 MG SL tablet 1 tablet  1 tablet SubLINGual Once Sofia Stone APRN - CNP        morphine (PF) injection 2 mg  2 mg IntraVENous Q3H PRN Liv Gordon DO   2 mg at 25 0339    docusate sodium (COLACE) capsule 100 mg  100 mg Oral BID Garett Varghese MD   100 mg at 25 0803    bisacodyl (DULCOLAX) suppository 10 mg  10 mg Rectal Once Garett Varghese MD        piperacillin-tazobactam (ZOSYN) injection 3,375 mg  3,375 mg IntraVENous Q6H Liv Gordon DO   3,375 mg at 25 0530    And    sodium chloride (PF) 0.9 % injection 10 mL   advised  Continue Nicotine patch    Hx Opiate Abuse  Continue Suboxone as at home    Follow labs   DVT prophylaxis  Please see orders for further management and care.  Discharge plan: TBD pending clinical course    The pertinent details of this case were discussed with Dr. Palm.    Electronically signed by STUART Ji CNP on 3/17/2025 at 11:03 AM    Addendum: I have personally participated in a face-to-face history and physical exam on the date of service with the patient. I have discussed the case with the nurse practitioner. I also participated in medical decision making on the date of service and I agree with all of the pertinent clinical information unless indicated in my editing of the note. I have reviewed and edited the note above based on my findings during my history, exam, and decision making on the same day of service.     My additional thoughts:   He was seen and examined in his room resting very comfortably  General Surgery input is appreciated for colovesicular fistula  Jorge catheter in place  Antibiotics per ID  N.p.o. for possible OR today  Vistaril as needed for anxiety-continue Remeron  Bowel regimen and heartburn medications per general surgery    Electronically signed by Tyrell Palm DO on 3/17/2025 at 1:50 PM    I can be reached through [facility].

## 2025-03-16 NOTE — ED NOTES
ED to Inpatient Handoff Report    Notified 5S that electronic handoff available and patient ready for transport to room 545.    Safety Risks: None identified    Patient in Restraints: no    Constant Observer or Patient : no    Telemetry Monitoring Ordered: No          Order to transfer to unit without monitor: YES    Last MEWS: 1 Time completed: 2012    Deterioration Index: 18.11    Vitals:    03/15/25 1347 03/15/25 2000 03/15/25 2012   BP: (!) 134/92 115/73 115/73   Pulse: 78  78   Resp: 17  18   Temp: 98.1 °F (36.7 °C)  98.3 °F (36.8 °C)   TempSrc: Oral  Oral   SpO2: 99%  100%   Weight: 93.9 kg (207 lb)     Height: 1.778 m (5' 10\")         Opportunity for questions and clarification was provided.

## 2025-03-16 NOTE — PROGRESS NOTES
Pt c/o feeling a lot of anxiety while in hospital. Requesting to talk with psychiatry for medication adjustment. Pt states he takes 24-2 buprenorphine-naloxone, states he gets his meds from [facility]. Charge nurse aware and will follow up.Pt also would like nicotine patch.

## 2025-03-16 NOTE — PROGRESS NOTES
Mary Ann Pete NP called and message left that we will need admission orders for this new admission.

## 2025-03-16 NOTE — RT PROTOCOL NOTE
RT Nebulizer Bronchodilator Protocol Note    There is a bronchodilator order in the chart from a provider indicating to follow the RT Bronchodilator Protocol and there is an “Initiate RT Bronchodilator Protocol” order as well (see protocol at bottom of note).    CXR Findings:  No results found.    The findings from the last RT Protocol Assessment were as follows:  Smoking: Smoker 15 pack years or more  Respiratory Pattern: Dyspnea on exertion or RR 21-25 bpm  Breath Sounds: Clear breath sounds  Cough: Strong, productive  Indication for Bronchodilator Therapy: On home bronchodilators  Bronchodilator Assessment Score: 4    Aerosolized bronchodilator medication orders have been revised according to the RT Nebulizer Bronchodilator Protocol below.    Respiratory Therapist to perform RT Therapy Protocol Assessment initially then follow the protocol.  Repeat RT Therapy Protocol Assessment PRN for score 0-3 or on second treatment, BID, and PRN for scores above 3.    No Indications - adjust the frequency to every 6 hours PRN wheezing or bronchospasm, if no treatments needed after 48 hours then discontinue using Per Protocol order mode.     If indication present, adjust the RT bronchodilator orders based on the Bronchodilator Assessment Score as indicated below.  If a patient is on this medication at home then do not decrease Frequency below that used at home.    0-3 - enter or revise RT bronchodilator order(s) to equivalent RT Bronchodilator order with Frequency of every 4 hours PRN for wheezing or increased work of breathing using Per Protocol order mode.       4-6 - enter or revise RT Bronchodilator order(s) to two equivalent RT bronchodilator orders with one order with BID Frequency and one order with Frequency of every 4 hours PRN wheezing or increased work of breathing using Per Protocol order mode.         7-10 - enter or revise RT Bronchodilator order(s) to two equivalent RT bronchodilator orders with one order with  TID Frequency and one order with Frequency of every 4 hours PRN wheezing or increased work of breathing using Per Protocol order mode.       11-13 - enter or revise RT Bronchodilator order(s) to one equivalent RT bronchodilator order with QID Frequency and an Albuterol order with Frequency of every 4 hours PRN wheezing or increased work of breathing using Per Protocol order mode.      Greater than 13 - enter or revise RT Bronchodilator order(s) to one equivalent RT bronchodilator order with every 4 hours Frequency and an Albuterol order with Frequency of every 2 hours PRN wheezing or increased work of breathing using Per Protocol order mode.         Electronically signed by Olive Patel RCP on 3/15/2025 at 10:32 PM

## 2025-03-16 NOTE — CARE COORDINATION
Internal Medicine On-call Care Coordination Note    I was called by the ED physician because they recommended admission for this patient and we cover their PCP.  The history as I understand it after discussion with the ED physician is as follows:    Presents with worsening abdominal pain  Hx colovesical fistula, had surgery planned for 1 month  Imaging showing increased pelvic abscess    I placed admission orders.  Including:    General admission orders  Reconciled home meds  General surgery consulted in ED  NPO midnight   Held anticoagulants pending plan     Dr. Palm, or our coverage will see the patient tomorrow for H&P.    Electronically signed by STUART Sumner CNP on 3/15/2025 at 9:35 PM

## 2025-03-16 NOTE — PROGRESS NOTES
4 Eyes Skin Assessment     NAME:  Yevgeniy Johnson  YOB: 1960  MEDICAL RECORD NUMBER:  [id number]    The patient is being assessed for  Admission    I agree that at least one RN has performed a thorough Head to Toe Skin Assessment on the patient. ALL assessment sites listed below have been assessed.      Areas assessed by both nurses:    Head, Face, Ears, Shoulders, Back, Chest, Arms, Elbows, Hands, Sacrum. Buttock, Coccyx, Ischium, Legs. Feet and Heels, and Under Medical Devices         Does the Patient have a Wound? No noted wound(s)       Johnnie Prevention initiated by RN: No  Wound Care Orders initiated by RN: No    Pressure Injury (Stage 3,4, Unstageable, DTI, NWPT, and Complex wounds) if present, place Wound referral order by RN under : No    New Ostomies, if present place, Ostomy referral order under : No     Nurse 1 eSignature: Electronically signed by Patricia Lucas RN on 3/16/25 at 7:11 AM EDT    **SHARE this note so that the co-signing nurse can place an eSignature**    Nurse 2 eSignature: Electronically signed by Mary Ellen Nava RN on 3/16/25 at 7:28 AM EDT

## 2025-03-16 NOTE — PROGRESS NOTES
Madelyn from [facility] states their systems are currently down and she cannot confirm the patient gets medications there or the dose of the medications. She stated systems will be back up and running Monday after 8am

## 2025-03-16 NOTE — PROGRESS NOTES
Spoke with Dr. Gordon regarding patiens request for psych consult. Per Dr. Gordon patient does not meet criteria for the consult

## 2025-03-16 NOTE — PLAN OF CARE
Problem: Discharge Planning  Goal: Discharge to home or other facility with appropriate resources  Outcome: Progressing  Flowsheets (Taken 3/15/2025 2038)  Discharge to home or other facility with appropriate resources: Refer to discharge planning if patient needs post-hospital services based on physician order or complex needs related to functional status, cognitive ability or social support system     Problem: Pain  Goal: Verbalizes/displays adequate comfort level or baseline comfort level  Outcome: Progressing  Flowsheets (Taken 3/15/2025 2038)  Verbalizes/displays adequate comfort level or baseline comfort level: Encourage patient to monitor pain and request assistance     Problem: ABCDS Injury Assessment  Goal: Absence of physical injury  Outcome: Progressing  Flowsheets (Taken 3/15/2025 2038)  Absence of Physical Injury: Implement safety measures based on patient assessment     Problem: Safety - Adult  Goal: Free from fall injury  Outcome: Progressing  Flowsheets (Taken 3/15/2025 2038)  Free From Fall Injury: Instruct family/caregiver on patient safety

## 2025-03-16 NOTE — CONSULTS
Surgery Consult    Reason for consult: Colovesical fistula/pelvic abscess    HPI  64 y.o. male who is known to the surgery service.  He has a known colovesical fistula and is scheduled to have surgery next month with Dr. Barrett.  Came to the hospital with worsening suprapubic pain.  He reports that he has not had much stool per rectum, but has had increased fecaluria and pneumaturia.      Past Medical History:   Diagnosis Date    COPD (chronic obstructive pulmonary disease) (HCC)     Hypertension        Past Surgical History:   Procedure Laterality Date    KNEE SURGERY Right     LUMBAR LAMINECTOMY         Medications Prior to Admission:    Prior to Admission medications    Medication Sig Start Date End Date Taking? Authorizing Provider   tamsulosin (FLOMAX) 0.4 MG capsule Take 1 capsule by mouth daily 12/6/22  Yes Jenniffer Méndez APRN - NP   ANORO ELLIPTA 62.5-25 MCG/INH AEPB inhaler inhale 1 puff by mouth and INTO THE LUNGS once daily 9/18/22  Yes Hany Yates MD   albuterol (PROVENTIL) (2.5 MG/3ML) 0.083% nebulizer solution inhale contents of 1 vial ( 3 milliliters ) in nebulizer by mouth...  (REFER TO PRESCRIPTION NOTES). 8/1/22  Yes Hany Yates MD   buprenorphine-naloxone (SUBOXONE) 8-2 MG FILM SL film Place 1 Film under the tongue daily.   Yes ProviderHany MD   ondansetron (ZOFRAN ODT) 4 MG disintegrating tablet Take 1 tablet by mouth 3 times daily as needed for Nausea or Vomiting 8/8/22  Yes Dennis Alfred PA   naproxen (NAPROSYN) 500 MG tablet Take 1 tablet by mouth 2 times daily as needed for Pain 9/25/24 9/30/24  Crissy Betancourt APRN - CNP   escitalopram (LEXAPRO) 10 MG tablet take 1 tablet by mouth at bedtime  Patient not taking: Reported on 3/15/2025 1/25/23   Hany Yates MD   traZODone (DESYREL) 100 MG tablet take 1 tablet by mouth at bedtime if needed for insomnia 12/28/22   Hany Yates MD   traZODone (DESYREL) 150 MG tablet take 1-2 tablets by  of the abdomen and pelvis was performed with the administration of intravenous contrast. Multiplanar reformatted images are provided for review. Automated exposure control, iterative reconstruction, and/or weight based adjustment of the mA/kV was utilized to reduce the radiation dose to as low as reasonably achievable. COMPARISON: None. HISTORY: ORDERING SYSTEM PROVIDED HISTORY: lower abdominal pain TECHNOLOGIST PROVIDED HISTORY: Additional Contrast?->None Reason for exam:->lower abdominal pain Decision Support Exception - unselect if not a suspected or confirmed emergency medical condition->Emergency Medical Condition (MA) FINDINGS: Lower Chest: There ground-glass opacities within the right middle lobe with bronchiectatic change and tree-in-bud opacities suggests inflammatory change. This is not significantly improved when compared the previous study.  No new areas of consolidation are identified.  The heart is not appear enlarged. There are no signs of pericardial effusion. Organs: The liver reveals a homogeneous density.  There are signs of mild central duct dilation.  The gallbladder reveals several non calcified stones. In the limits of the examination, there are no signs of choledocholithiasis but sonography or MRI may be of increased sensitivity if there are elevated enzymes or concerns for obstruction.  The pancreas appears unremarkable.  The spleen and adrenal glands revealed no acute abnormalities. The left kidney appears normal in size shape and position.  There is a small low-density focus in the inferior pole measuring 6 mm having the appearance of a cyst.  The right kidney appears normal in size shape and position it reveals no signs of stones or hydronephrosis.  There is an exophytic low-density area measuring 10 mm having the appearance of a cyst.  Each ureter appears normal in course and caliber. GI/Bowel: There is a small fixed hiatus hernia.  Stomach appears with the range otherwise.  The small

## 2025-03-16 NOTE — PLAN OF CARE
Problem: Pain  Goal: Verbalizes/displays adequate comfort level or baseline comfort level  3/16/2025 1051 by Karla Valenzuela RN  Outcome: Progressing  3/16/2025 0855 by Patricia Lucas RN  Outcome: Progressing  Flowsheets (Taken 3/15/2025 2038)  Verbalizes/displays adequate comfort level or baseline comfort level: Encourage patient to monitor pain and request assistance     Problem: ABCDS Injury Assessment  Goal: Absence of physical injury  3/16/2025 1051 by Karla Valenzuela RN  Outcome: Progressing  3/16/2025 0855 by Patricia Lucas RN  Outcome: Progressing  Flowsheets  Taken 3/16/2025 0815 by Karla Valenzuela RN  Absence of Physical Injury: Implement safety measures based on patient assessment  Taken 3/15/2025 2038 by Patricia Lucas RN  Absence of Physical Injury: Implement safety measures based on patient assessment     Problem: Safety - Adult  Goal: Free from fall injury  3/16/2025 1051 by Karla Valenzuela RN  Outcome: Progressing  3/16/2025 0855 by Patricia Lucas RN  Outcome: Progressing  Flowsheets (Taken 3/15/2025 2038)  Free From Fall Injury: Instruct family/caregiver on patient safety

## 2025-03-16 NOTE — H&P
escitalopram (LEXAPRO) 10 MG tablet take 1 tablet by mouth at bedtime  Patient not taking: Reported on 3/15/2025  Hany Yates MD   traZODone (DESYREL) 100 MG tablet take 1 tablet by mouth at bedtime if needed for insomnia  Hany Yates MD   traZODone (DESYREL) 150 MG tablet take 1-2 tablets by mouth at bedtime  Hany Yates MD   mirtazapine (REMERON) 30 MG tablet   Hany Yates MD   mirtazapine (REMERON) 15 MG tablet Take 15 mg by mouth nightly  ProviderHany MD     Social History     Tobacco Use    Smoking status: Every Day     Types: Cigarettes    Smokeless tobacco: Never     History reviewed. No pertinent family history.  Past Surgical History:   Procedure Laterality Date    KNEE SURGERY Right     LUMBAR LAMINECTOMY       Past Medical History:   Diagnosis Date    COPD (chronic obstructive pulmonary disease) (HCC)     Hypertension      Review of Systems   Constitutional:  Positive for chills and fever.   HENT:  Negative for congestion, ear pain and facial swelling.    Eyes:  Negative for photophobia and visual disturbance.   Respiratory:  Negative for cough and shortness of breath.    Cardiovascular:  Negative for chest pain, palpitations and leg swelling.   Gastrointestinal:  Positive for abdominal pain and constipation. Negative for diarrhea, nausea and vomiting.   Endocrine: Negative for polydipsia, polyphagia and polyuria.   Genitourinary:  Positive for dysuria and frequency. Negative for hematuria.   Musculoskeletal:  Negative for arthralgias and back pain.   Skin:  Negative for color change and pallor.   Allergic/Immunologic: Negative for food allergies and immunocompromised state.   Neurological:  Negative for dizziness and light-headedness.   Hematological:  Negative for adenopathy. Does not bruise/bleed easily.   Psychiatric/Behavioral:  Negative for agitation and confusion.      Vitals:    03/15/25 2038 03/15/25 2227 03/15/25 2343 03/16/25 0503   BP: 117/81   COVID-19   Hypertension    Plan:  Repeat Covid-19. Keep in isolation for now. General surgery and infectious disease consulted. Keep on IV zosyn for now. Urine culture pending.     DVT Prophylaxis: PCDs  Code Status: Full     Liv Gordon DO      Electronically signed by Liv Gordon DO on 3/16/2025 at 7:26 AM

## 2025-03-17 LAB
ALBUMIN SERPL-MCNC: 3.4 G/DL (ref 3.5–5.2)
ALP SERPL-CCNC: 77 U/L (ref 40–129)
ALT SERPL-CCNC: 14 U/L (ref 0–40)
ANION GAP SERPL CALCULATED.3IONS-SCNC: 11 MMOL/L (ref 7–16)
AST SERPL-CCNC: 14 U/L (ref 0–39)
BASOPHILS # BLD: 0.06 K/UL (ref 0–0.2)
BASOPHILS NFR BLD: 1 % (ref 0–2)
BILIRUB SERPL-MCNC: 0.3 MG/DL (ref 0–1.2)
BUN SERPL-MCNC: 8 MG/DL (ref 6–23)
CALCIUM SERPL-MCNC: 8.9 MG/DL (ref 8.6–10.2)
CHLORIDE SERPL-SCNC: 102 MMOL/L (ref 98–107)
CO2 SERPL-SCNC: 26 MMOL/L (ref 22–29)
CREAT SERPL-MCNC: 0.8 MG/DL (ref 0.7–1.2)
EKG ATRIAL RATE: 73 BPM
EKG P AXIS: 13 DEGREES
EKG P-R INTERVAL: 128 MS
EKG Q-T INTERVAL: 408 MS
EKG QRS DURATION: 96 MS
EKG QTC CALCULATION (BAZETT): 449 MS
EKG R AXIS: 35 DEGREES
EKG T AXIS: 52 DEGREES
EKG VENTRICULAR RATE: 73 BPM
EOSINOPHIL # BLD: 0.14 K/UL (ref 0.05–0.5)
EOSINOPHILS RELATIVE PERCENT: 2 % (ref 0–6)
ERYTHROCYTE [DISTWIDTH] IN BLOOD BY AUTOMATED COUNT: 12.8 % (ref 11.5–15)
GFR, ESTIMATED: >90 ML/MIN/1.73M2
GLUCOSE SERPL-MCNC: 108 MG/DL (ref 74–99)
HCT VFR BLD AUTO: 40.9 % (ref 37–54)
HGB BLD-MCNC: 13.1 G/DL (ref 12.5–16.5)
IMM GRANULOCYTES # BLD AUTO: <0.03 K/UL (ref 0–0.58)
IMM GRANULOCYTES NFR BLD: 0 % (ref 0–5)
LYMPHOCYTES NFR BLD: 1.76 K/UL (ref 1.5–4)
LYMPHOCYTES RELATIVE PERCENT: 23 % (ref 20–42)
MCH RBC QN AUTO: 30 PG (ref 26–35)
MCHC RBC AUTO-ENTMCNC: 32 G/DL (ref 32–34.5)
MCV RBC AUTO: 93.6 FL (ref 80–99.9)
MICROORGANISM SPEC CULT: ABNORMAL
MONOCYTES NFR BLD: 1.1 K/UL (ref 0.1–0.95)
MONOCYTES NFR BLD: 14 % (ref 2–12)
NEUTROPHILS NFR BLD: 60 % (ref 43–80)
NEUTS SEG NFR BLD: 4.71 K/UL (ref 1.8–7.3)
PLATELET # BLD AUTO: 361 K/UL (ref 130–450)
PMV BLD AUTO: 9.2 FL (ref 7–12)
POTASSIUM SERPL-SCNC: 4 MMOL/L (ref 3.5–5)
PROT SERPL-MCNC: 6.5 G/DL (ref 6.4–8.3)
RBC # BLD AUTO: 4.37 M/UL (ref 3.8–5.8)
SERVICE CMNT-IMP: ABNORMAL
SODIUM SERPL-SCNC: 139 MMOL/L (ref 132–146)
SPECIMEN DESCRIPTION: ABNORMAL
WBC OTHER # BLD: 7.8 K/UL (ref 4.5–11.5)

## 2025-03-17 PROCEDURE — 2580000003 HC RX 258: Performed by: INTERNAL MEDICINE

## 2025-03-17 PROCEDURE — 80053 COMPREHEN METABOLIC PANEL: CPT

## 2025-03-17 PROCEDURE — 6360000002 HC RX W HCPCS: Performed by: NURSE PRACTITIONER

## 2025-03-17 PROCEDURE — 6370000000 HC RX 637 (ALT 250 FOR IP): Performed by: NURSE PRACTITIONER

## 2025-03-17 PROCEDURE — 6370000000 HC RX 637 (ALT 250 FOR IP): Performed by: SURGERY

## 2025-03-17 PROCEDURE — 94640 AIRWAY INHALATION TREATMENT: CPT

## 2025-03-17 PROCEDURE — 85025 COMPLETE CBC W/AUTO DIFF WBC: CPT

## 2025-03-17 PROCEDURE — 93010 ELECTROCARDIOGRAM REPORT: CPT | Performed by: INTERNAL MEDICINE

## 2025-03-17 PROCEDURE — 2500000003 HC RX 250 WO HCPCS: Performed by: NURSE PRACTITIONER

## 2025-03-17 PROCEDURE — 36415 COLL VENOUS BLD VENIPUNCTURE: CPT

## 2025-03-17 PROCEDURE — 6360000002 HC RX W HCPCS: Performed by: INTERNAL MEDICINE

## 2025-03-17 PROCEDURE — 6370000000 HC RX 637 (ALT 250 FOR IP): Performed by: INTERNAL MEDICINE

## 2025-03-17 PROCEDURE — 6360000002 HC RX W HCPCS: Performed by: SURGERY

## 2025-03-17 PROCEDURE — 1200000000 HC SEMI PRIVATE

## 2025-03-17 RX ORDER — BUPRENORPHINE HYDROCHLORIDE AND NALOXONE HYDROCHLORIDE DIHYDRATE 8; 2 MG/1; MG/1
1 TABLET SUBLINGUAL
Status: DISCONTINUED | OUTPATIENT
Start: 2025-03-18 | End: 2025-03-28 | Stop reason: HOSPADM

## 2025-03-17 RX ORDER — HYDROXYZINE PAMOATE 25 MG/1
25 CAPSULE ORAL EVERY 6 HOURS PRN
Status: DISCONTINUED | OUTPATIENT
Start: 2025-03-17 | End: 2025-03-28 | Stop reason: HOSPADM

## 2025-03-17 RX ORDER — BUPRENORPHINE HYDROCHLORIDE AND NALOXONE HYDROCHLORIDE DIHYDRATE 8; 2 MG/1; MG/1
1 TABLET SUBLINGUAL
COMMUNITY

## 2025-03-17 RX ORDER — POLYETHYLENE GLYCOL 3350 17 G/17G
17 POWDER, FOR SOLUTION ORAL DAILY PRN
Status: DISCONTINUED | OUTPATIENT
Start: 2025-03-17 | End: 2025-03-19

## 2025-03-17 RX ORDER — BUPRENORPHINE HYDROCHLORIDE AND NALOXONE HYDROCHLORIDE DIHYDRATE 8; 2 MG/1; MG/1
2 TABLET SUBLINGUAL DAILY
COMMUNITY

## 2025-03-17 RX ORDER — BUPRENORPHINE HYDROCHLORIDE AND NALOXONE HYDROCHLORIDE DIHYDRATE 8; 2 MG/1; MG/1
2 TABLET SUBLINGUAL DAILY
Status: DISCONTINUED | OUTPATIENT
Start: 2025-03-18 | End: 2025-03-28 | Stop reason: HOSPADM

## 2025-03-17 RX ORDER — BUPRENORPHINE HYDROCHLORIDE AND NALOXONE HYDROCHLORIDE DIHYDRATE 8; 2 MG/1; MG/1
1 TABLET SUBLINGUAL ONCE
Status: COMPLETED | OUTPATIENT
Start: 2025-03-17 | End: 2025-03-17

## 2025-03-17 RX ADMIN — MORPHINE SULFATE 2 MG: 2 INJECTION, SOLUTION INTRAMUSCULAR; INTRAVENOUS at 03:39

## 2025-03-17 RX ADMIN — SODIUM CHLORIDE 10 ML: 9 INJECTION INTRAMUSCULAR; INTRAVENOUS; SUBCUTANEOUS at 16:54

## 2025-03-17 RX ADMIN — MORPHINE SULFATE 2 MG: 2 INJECTION, SOLUTION INTRAMUSCULAR; INTRAVENOUS at 00:16

## 2025-03-17 RX ADMIN — SODIUM CHLORIDE 10 ML: 9 INJECTION INTRAMUSCULAR; INTRAVENOUS; SUBCUTANEOUS at 05:30

## 2025-03-17 RX ADMIN — PIPERACILLIN AND TAZOBACTAM 3375 MG: 3; .375 INJECTION, POWDER, LYOPHILIZED, FOR SOLUTION INTRAVENOUS at 23:11

## 2025-03-17 RX ADMIN — IPRATROPIUM BROMIDE 0.5 MG: 0.5 SOLUTION RESPIRATORY (INHALATION) at 21:29

## 2025-03-17 RX ADMIN — MORPHINE SULFATE 2 MG: 2 INJECTION, SOLUTION INTRAMUSCULAR; INTRAVENOUS at 15:05

## 2025-03-17 RX ADMIN — ONDANSETRON 4 MG: 4 TABLET, ORALLY DISINTEGRATING ORAL at 13:47

## 2025-03-17 RX ADMIN — DOCUSATE SODIUM 100 MG: 100 CAPSULE, LIQUID FILLED ORAL at 19:29

## 2025-03-17 RX ADMIN — DOCUSATE SODIUM 100 MG: 100 CAPSULE, LIQUID FILLED ORAL at 08:03

## 2025-03-17 RX ADMIN — MORPHINE SULFATE 2 MG: 2 INJECTION, SOLUTION INTRAMUSCULAR; INTRAVENOUS at 21:28

## 2025-03-17 RX ADMIN — BUPRENORPHINE HYDROCHLORIDE AND NALOXONE HYDROCHLORIDE DIHYDRATE 1 TABLET: 8; 2 TABLET SUBLINGUAL at 08:03

## 2025-03-17 RX ADMIN — PIPERACILLIN AND TAZOBACTAM 3375 MG: 3; .375 INJECTION, POWDER, LYOPHILIZED, FOR SOLUTION INTRAVENOUS at 11:14

## 2025-03-17 RX ADMIN — HYDROXYZINE PAMOATE 25 MG: 25 CAPSULE ORAL at 18:28

## 2025-03-17 RX ADMIN — SODIUM CHLORIDE 10 ML: 9 INJECTION INTRAMUSCULAR; INTRAVENOUS; SUBCUTANEOUS at 11:14

## 2025-03-17 RX ADMIN — POLYETHYLENE GLYCOL 3350 17 G: 17 POWDER, FOR SOLUTION ORAL at 13:41

## 2025-03-17 RX ADMIN — MORPHINE SULFATE 2 MG: 2 INJECTION, SOLUTION INTRAMUSCULAR; INTRAVENOUS at 18:28

## 2025-03-17 RX ADMIN — SODIUM CHLORIDE, PRESERVATIVE FREE 10 ML: 5 INJECTION INTRAVENOUS at 19:29

## 2025-03-17 RX ADMIN — SODIUM CHLORIDE 10 ML: 9 INJECTION INTRAMUSCULAR; INTRAVENOUS; SUBCUTANEOUS at 00:17

## 2025-03-17 RX ADMIN — TAMSULOSIN HYDROCHLORIDE 0.4 MG: 0.4 CAPSULE ORAL at 08:03

## 2025-03-17 RX ADMIN — ARFORMOTEROL TARTRATE 15 MCG: 15 SOLUTION RESPIRATORY (INHALATION) at 21:29

## 2025-03-17 RX ADMIN — BUPRENORPHINE HYDROCHLORIDE AND NALOXONE HYDROCHLORIDE DIHYDRATE 1 TABLET: 8; 2 TABLET SUBLINGUAL at 11:12

## 2025-03-17 RX ADMIN — SODIUM CHLORIDE, PRESERVATIVE FREE 10 ML: 5 INJECTION INTRAVENOUS at 08:04

## 2025-03-17 RX ADMIN — ALBUTEROL SULFATE 2.5 MG: 2.5 SOLUTION RESPIRATORY (INHALATION) at 16:36

## 2025-03-17 RX ADMIN — PIPERACILLIN AND TAZOBACTAM 3375 MG: 3; .375 INJECTION, POWDER, LYOPHILIZED, FOR SOLUTION INTRAVENOUS at 16:54

## 2025-03-17 RX ADMIN — PIPERACILLIN AND TAZOBACTAM 3375 MG: 3; .375 INJECTION, POWDER, LYOPHILIZED, FOR SOLUTION INTRAVENOUS at 00:17

## 2025-03-17 RX ADMIN — HYDROXYZINE PAMOATE 25 MG: 25 CAPSULE ORAL at 11:19

## 2025-03-17 RX ADMIN — PIPERACILLIN AND TAZOBACTAM 3375 MG: 3; .375 INJECTION, POWDER, LYOPHILIZED, FOR SOLUTION INTRAVENOUS at 05:30

## 2025-03-17 RX ADMIN — MIRTAZAPINE 30 MG: 15 TABLET, FILM COATED ORAL at 19:29

## 2025-03-17 RX ADMIN — SODIUM CHLORIDE 10 ML: 9 INJECTION INTRAMUSCULAR; INTRAVENOUS; SUBCUTANEOUS at 23:11

## 2025-03-17 ASSESSMENT — PAIN DESCRIPTION - LOCATION
LOCATION: ABDOMEN
LOCATION: BACK
LOCATION: BACK;ABDOMEN
LOCATION: PENIS
LOCATION: ABDOMEN;BACK

## 2025-03-17 ASSESSMENT — PAIN SCALES - GENERAL
PAINLEVEL_OUTOF10: 4
PAINLEVEL_OUTOF10: 8
PAINLEVEL_OUTOF10: 8
PAINLEVEL_OUTOF10: 7
PAINLEVEL_OUTOF10: 8
PAINLEVEL_OUTOF10: 3

## 2025-03-17 ASSESSMENT — PAIN DESCRIPTION - DESCRIPTORS
DESCRIPTORS: ACHING
DESCRIPTORS: BURNING
DESCRIPTORS: ACHING;STABBING;THROBBING

## 2025-03-17 ASSESSMENT — PAIN DESCRIPTION - ORIENTATION
ORIENTATION: LOWER

## 2025-03-17 NOTE — PROGRESS NOTES
Patient c/o lower abd pain & states catheter leaks when he sits on the toilet. Asking for it to be removed. Dr Carlson states raphael needs to remain in place until Dr Barrett sees patient later today. Irrigated catheter and did bladder scan which is showing 0ml.

## 2025-03-17 NOTE — PLAN OF CARE
Problem: Discharge Planning  Goal: Discharge to home or other facility with appropriate resources  Outcome: Progressing     Problem: Pain  Goal: Verbalizes/displays adequate comfort level or baseline comfort level  3/17/2025 0010 by Mary Ellen Nava RN  Outcome: Progressing  3/16/2025 1051 by Karla Valenzuela RN  Outcome: Progressing     Problem: ABCDS Injury Assessment  Goal: Absence of physical injury  3/17/2025 0010 by Mary Ellen Nava RN  Outcome: Progressing  3/16/2025 1051 by Karla Valenzuela RN  Outcome: Progressing     Problem: Safety - Adult  Goal: Free from fall injury  3/17/2025 0010 by Mary Ellen Nava RN  Outcome: Progressing  3/16/2025 1051 by Karla Valenzuela RN  Outcome: Progressing

## 2025-03-17 NOTE — PLAN OF CARE
Problem: Discharge Planning  Goal: Discharge to home or other facility with appropriate resources  3/17/2025 0936 by Laury Haley RN  Outcome: Progressing  3/17/2025 0028 by Mary Ellen Nava RN  Outcome: Progressing  3/17/2025 0010 by Mary Ellen Nava RN  Outcome: Progressing     Problem: Pain  Goal: Verbalizes/displays adequate comfort level or baseline comfort level  3/17/2025 0936 by Laury Haley RN  Outcome: Progressing  3/17/2025 0028 by Mary Ellen Nava RN  Outcome: Progressing  3/17/2025 0010 by Mary Ellen Nava RN  Outcome: Progressing     Problem: ABCDS Injury Assessment  Goal: Absence of physical injury  3/17/2025 0936 by Laury Haley RN  Outcome: Progressing  3/17/2025 0028 by Mary Ellen Nava RN  Outcome: Progressing  3/17/2025 0010 by Mary Ellen Nava RN  Outcome: Progressing     Problem: Safety - Adult  Goal: Free from fall injury  3/17/2025 0936 by Laury Haley RN  Outcome: Progressing  3/17/2025 0028 by Mary Ellen Nava RN  Outcome: Progressing  3/17/2025 0010 by Mary Ellen Nava RN  Outcome: Progressing

## 2025-03-17 NOTE — PROGRESS NOTES
Call placed to [facility] for confirmation of medication dose. Left detailed voicemail with  requesting call back.      10:26 AM Last filled march 13th 7 - days suboxone 24-6 mg dose oral tab - 3 8mg-2mg.

## 2025-03-17 NOTE — PROGRESS NOTES
[facility]  [facility]  Progress Note    SUBJECTIVE:  Chief Complaint   Patient presents with    Constipation     X3 days    Shortness of Breath    Nasal Congestion    Abdominal Pain     Mid lower abd pain      Patient is tolerating medications. No reported adverse drug reactions.  No nausea, vomiting, diarrhea.  Patient is resting in bed, he is still reporting abdominal pain, reports he has not been able to move his bowels, reports he does not have the urge to have a bowel movement, he is also complaining of heartburn  No fevers overnight    Review of systems:  As stated above in the chief complaint, otherwise negative.    Medications:  Scheduled Meds:   [START ON 3/18/2025] buprenorphine-naloxone  2 tablet SubLINGual Daily    [START ON 3/18/2025] buprenorphine-naloxone  1 tablet SubLINGual QHS    docusate sodium  100 mg Oral BID    bisacodyl  10 mg Rectal Once    piperacillin-tazobactam  3,375 mg IntraVENous Q6H    And    sodium chloride (PF)  10 mL IntraVENous Q6H    nicotine  1 patch TransDERmal Daily    mirtazapine  30 mg Oral Nightly    tamsulosin  0.4 mg Oral Daily    sodium chloride flush  10 mL IntraVENous 2 times per day    arformoterol tartrate  15 mcg Nebulization BID RT    And    ipratropium  0.5 mg Nebulization BID     Continuous Infusions:   sodium chloride       PRN Meds:hydrOXYzine pamoate, morphine, traZODone, albuterol, sodium chloride flush, sodium chloride, potassium chloride **OR** potassium alternative oral replacement **OR** potassium chloride, magnesium sulfate, ondansetron **OR** ondansetron, senna, acetaminophen **OR** acetaminophen, melatonin, fluticasone    OBJECTIVE:  /87   Pulse 94   Temp 97.7 °F (36.5 °C) (Oral)   Resp 16   Ht 1.778 m (5' 10\")   Wt 94.6 kg (208 lb 8.9 oz)   SpO2 92%   BMI 29.92 kg/m²   Temp  Av.8 °F (36.6 °C)  Min: 97.7 °F (36.5 °C)  Max: 97.9 °F (36.6 °C)  Constitutional: The patient is awake, alert, and oriented.

## 2025-03-17 NOTE — PLAN OF CARE
Problem: Discharge Planning  Goal: Discharge to home or other facility with appropriate resources  3/17/2025 0028 by Mary Ellen Nava RN  Outcome: Progressing  3/17/2025 0010 by Mary Ellen Nava RN  Outcome: Progressing     Problem: Pain  Goal: Verbalizes/displays adequate comfort level or baseline comfort level  3/17/2025 0028 by Mary Ellen Nava RN  Outcome: Progressing  3/17/2025 0010 by Mary Ellen Nava RN  Outcome: Progressing  3/16/2025 1051 by Karla Valenzuela RN  Outcome: Progressing     Problem: ABCDS Injury Assessment  Goal: Absence of physical injury  3/17/2025 0028 by Mary Ellen Nava RN  Outcome: Progressing  3/17/2025 0010 by Mary Ellen Nava RN  Outcome: Progressing  3/16/2025 1051 by Karla Valenzuela RN  Outcome: Progressing     Problem: Safety - Adult  Goal: Free from fall injury  3/17/2025 0028 by Mary Ellen Nava RN  Outcome: Progressing  3/17/2025 0010 by Mary Ellen Nava RN  Outcome: Progressing  3/16/2025 1051 by Karla Valenzuela RN  Outcome: Progressing

## 2025-03-18 ENCOUNTER — ANESTHESIA EVENT (OUTPATIENT)
Dept: OPERATING ROOM | Age: 65
End: 2025-03-18
Payer: MEDICARE

## 2025-03-18 ENCOUNTER — APPOINTMENT (OUTPATIENT)
Age: 65
End: 2025-03-18
Payer: MEDICARE

## 2025-03-18 ENCOUNTER — APPOINTMENT (OUTPATIENT)
Dept: NUCLEAR MEDICINE | Age: 65
End: 2025-03-18
Payer: MEDICARE

## 2025-03-18 LAB
ALBUMIN SERPL-MCNC: 3.4 G/DL (ref 3.5–5.2)
ALP SERPL-CCNC: 72 U/L (ref 40–129)
ALT SERPL-CCNC: 12 U/L (ref 0–40)
ANION GAP SERPL CALCULATED.3IONS-SCNC: 10 MMOL/L (ref 7–16)
AST SERPL-CCNC: 10 U/L (ref 0–39)
BASOPHILS # BLD: 0.08 K/UL (ref 0–0.2)
BASOPHILS NFR BLD: 1 % (ref 0–2)
BILIRUB SERPL-MCNC: 0.4 MG/DL (ref 0–1.2)
BUN SERPL-MCNC: 6 MG/DL (ref 6–23)
CALCIUM SERPL-MCNC: 8.8 MG/DL (ref 8.6–10.2)
CHLORIDE SERPL-SCNC: 104 MMOL/L (ref 98–107)
CHOLEST SERPL-MCNC: 143 MG/DL
CO2 SERPL-SCNC: 25 MMOL/L (ref 22–29)
CREAT SERPL-MCNC: 0.8 MG/DL (ref 0.7–1.2)
ECHO AO ASC DIAM: 3.5 CM
ECHO AO ASCENDING AORTA INDEX: 1.64 CM/M2
ECHO AO SINUS VALSALVA DIAM: 2.9 CM
ECHO AO SINUS VALSALVA INDEX: 1.36 CM/M2
ECHO AV AREA PEAK VELOCITY: 2.6 CM2
ECHO AV AREA VTI: 2.7 CM2
ECHO AV AREA/BSA PEAK VELOCITY: 1.2 CM2/M2
ECHO AV AREA/BSA VTI: 1.3 CM2/M2
ECHO AV CUSP MM: 1.9 CM
ECHO AV MEAN GRADIENT: 6 MMHG
ECHO AV MEAN VELOCITY: 1.1 M/S
ECHO AV PEAK GRADIENT: 9 MMHG
ECHO AV PEAK VELOCITY: 1.5 M/S
ECHO AV VELOCITY RATIO: 0.8
ECHO AV VTI: 28.1 CM
ECHO BSA: 2.19 M2
ECHO EST RA PRESSURE: 3 MMHG
ECHO LA DIAMETER INDEX: 1.78 CM/M2
ECHO LA DIAMETER: 3.8 CM
ECHO LA VOL A-L A2C: 79 ML (ref 18–58)
ECHO LA VOL A-L A4C: 66 ML (ref 18–58)
ECHO LA VOL MOD A2C: 73 ML (ref 18–58)
ECHO LA VOL MOD A4C: 58 ML (ref 18–58)
ECHO LA VOLUME AREA LENGTH: 73 ML
ECHO LA VOLUME INDEX A-L A2C: 37 ML/M2 (ref 16–34)
ECHO LA VOLUME INDEX A-L A4C: 31 ML/M2 (ref 16–34)
ECHO LA VOLUME INDEX AREA LENGTH: 34 ML/M2 (ref 16–34)
ECHO LA VOLUME INDEX MOD A2C: 34 ML/M2 (ref 16–34)
ECHO LA VOLUME INDEX MOD A4C: 27 ML/M2 (ref 16–34)
ECHO LV E' LATERAL VELOCITY: 6 CM/S
ECHO LV E' SEPTAL VELOCITY: 6 CM/S
ECHO LV EF PHYSICIAN: 58 %
ECHO LV FRACTIONAL SHORTENING: 31 % (ref 28–44)
ECHO LV INTERNAL DIMENSION DIASTOLE INDEX: 2.25 CM/M2
ECHO LV INTERNAL DIMENSION DIASTOLIC: 4.8 CM (ref 4.2–5.9)
ECHO LV INTERNAL DIMENSION SYSTOLIC INDEX: 1.55 CM/M2
ECHO LV INTERNAL DIMENSION SYSTOLIC: 3.3 CM
ECHO LV ISOVOLUMETRIC RELAXATION TIME (IVRT): 124.6 MS
ECHO LV IVSD: 1.1 CM (ref 0.6–1)
ECHO LV IVSS: 1.3 CM
ECHO LV MASS 2D: 194 G (ref 88–224)
ECHO LV MASS INDEX 2D: 91.1 G/M2 (ref 49–115)
ECHO LV POSTERIOR WALL DIASTOLIC: 1.1 CM (ref 0.6–1)
ECHO LV POSTERIOR WALL SYSTOLIC: 1.3 CM
ECHO LV RELATIVE WALL THICKNESS RATIO: 0.46
ECHO LVOT AREA: 3.1 CM2
ECHO LVOT AV VTI INDEX: 0.84
ECHO LVOT DIAM: 2 CM
ECHO LVOT MEAN GRADIENT: 3 MMHG
ECHO LVOT PEAK GRADIENT: 6 MMHG
ECHO LVOT PEAK VELOCITY: 1.2 M/S
ECHO LVOT STROKE VOLUME INDEX: 34.9 ML/M2
ECHO LVOT SV: 74.4 ML
ECHO LVOT VTI: 23.7 CM
ECHO MV "A" WAVE DURATION: 133.8 MSEC
ECHO MV A VELOCITY: 0.66 M/S
ECHO MV AREA PHT: 2.5 CM2
ECHO MV AREA VTI: 3.2 CM2
ECHO MV E DECELERATION TIME (DT): 251.7 MS
ECHO MV E VELOCITY: 0.76 M/S
ECHO MV E/A RATIO: 1.15
ECHO MV E/E' LATERAL: 12.67
ECHO MV E/E' RATIO (AVERAGED): 12.67
ECHO MV E/E' SEPTAL: 12.67
ECHO MV LVOT VTI INDEX: 0.98
ECHO MV MAX VELOCITY: 0.8 M/S
ECHO MV MEAN GRADIENT: 2 MMHG
ECHO MV MEAN VELOCITY: 0.6 M/S
ECHO MV PEAK GRADIENT: 3 MMHG
ECHO MV PRESSURE HALF TIME (PHT): 87.2 MS
ECHO MV VTI: 23.2 CM
ECHO PULMONARY ARTERY END DIASTOLIC PRESSURE: 5 MMHG
ECHO PV MAX VELOCITY: 1.1 M/S
ECHO PV MEAN GRADIENT: 2 MMHG
ECHO PV MEAN VELOCITY: 0.7 M/S
ECHO PV PEAK GRADIENT: 5 MMHG
ECHO PV REGURGITANT MAX VELOCITY: 1.1 M/S
ECHO PV VTI: 22.3 CM
ECHO PVEIN A DURATION: 147.6 MS
ECHO PVEIN A VELOCITY: 0.4 M/S
ECHO PVEIN PEAK D VELOCITY: 0.5 M/S
ECHO PVEIN PEAK S VELOCITY: 0.6 M/S
ECHO PVEIN S/D RATIO: 1.2
ECHO RIGHT VENTRICULAR SYSTOLIC PRESSURE (RVSP): 24 MMHG
ECHO RV INTERNAL DIMENSION: 3 CM
ECHO RV TAPSE: 2.4 CM (ref 1.7–?)
ECHO TV REGURGITANT MAX VELOCITY: 2.27 M/S
ECHO TV REGURGITANT PEAK GRADIENT: 21 MMHG
EOSINOPHIL # BLD: 0.17 K/UL (ref 0.05–0.5)
EOSINOPHILS RELATIVE PERCENT: 3 % (ref 0–6)
ERYTHROCYTE [DISTWIDTH] IN BLOOD BY AUTOMATED COUNT: 13.1 % (ref 11.5–15)
GFR, ESTIMATED: >90 ML/MIN/1.73M2
GLUCOSE SERPL-MCNC: 111 MG/DL (ref 74–99)
HBA1C MFR BLD: 5.7 % (ref 4–5.6)
HCT VFR BLD AUTO: 38.7 % (ref 37–54)
HDLC SERPL-MCNC: 25 MG/DL
HGB BLD-MCNC: 12.5 G/DL (ref 12.5–16.5)
IMM GRANULOCYTES # BLD AUTO: <0.03 K/UL (ref 0–0.58)
IMM GRANULOCYTES NFR BLD: 0 % (ref 0–5)
LDLC SERPL CALC-MCNC: 101 MG/DL
LYMPHOCYTES NFR BLD: 1.77 K/UL (ref 1.5–4)
LYMPHOCYTES RELATIVE PERCENT: 28 % (ref 20–42)
MAGNESIUM SERPL-MCNC: 2.4 MG/DL (ref 1.6–2.6)
MCH RBC QN AUTO: 29.8 PG (ref 26–35)
MCHC RBC AUTO-ENTMCNC: 32.3 G/DL (ref 32–34.5)
MCV RBC AUTO: 92.1 FL (ref 80–99.9)
MONOCYTES NFR BLD: 0.86 K/UL (ref 0.1–0.95)
MONOCYTES NFR BLD: 13 % (ref 2–12)
NEUTROPHILS NFR BLD: 55 % (ref 43–80)
NEUTS SEG NFR BLD: 3.52 K/UL (ref 1.8–7.3)
PLATELET # BLD AUTO: 341 K/UL (ref 130–450)
PMV BLD AUTO: 9 FL (ref 7–12)
POTASSIUM SERPL-SCNC: 4.1 MMOL/L (ref 3.5–5)
PROT SERPL-MCNC: 6.3 G/DL (ref 6.4–8.3)
RBC # BLD AUTO: 4.2 M/UL (ref 3.8–5.8)
SODIUM SERPL-SCNC: 139 MMOL/L (ref 132–146)
T4 FREE SERPL-MCNC: 1.4 NG/DL (ref 0.9–1.7)
TRIGL SERPL-MCNC: 85 MG/DL
TSH SERPL DL<=0.05 MIU/L-ACNC: 1.56 UIU/ML (ref 0.27–4.2)
VLDLC SERPL CALC-MCNC: 17 MG/DL
WBC OTHER # BLD: 6.4 K/UL (ref 4.5–11.5)

## 2025-03-18 PROCEDURE — 1200000000 HC SEMI PRIVATE

## 2025-03-18 PROCEDURE — 2500000003 HC RX 250 WO HCPCS: Performed by: NURSE PRACTITIONER

## 2025-03-18 PROCEDURE — 99223 1ST HOSP IP/OBS HIGH 75: CPT | Performed by: INTERNAL MEDICINE

## 2025-03-18 PROCEDURE — 6360000002 HC RX W HCPCS: Performed by: NURSE PRACTITIONER

## 2025-03-18 PROCEDURE — 85025 COMPLETE CBC W/AUTO DIFF WBC: CPT

## 2025-03-18 PROCEDURE — 80053 COMPREHEN METABOLIC PANEL: CPT

## 2025-03-18 PROCEDURE — 6360000002 HC RX W HCPCS: Performed by: CLINICAL NURSE SPECIALIST

## 2025-03-18 PROCEDURE — 78452 HT MUSCLE IMAGE SPECT MULT: CPT | Performed by: INTERNAL MEDICINE

## 2025-03-18 PROCEDURE — 80061 LIPID PANEL: CPT

## 2025-03-18 PROCEDURE — 6360000002 HC RX W HCPCS: Performed by: SURGERY

## 2025-03-18 PROCEDURE — 93017 CV STRESS TEST TRACING ONLY: CPT

## 2025-03-18 PROCEDURE — 94640 AIRWAY INHALATION TREATMENT: CPT

## 2025-03-18 PROCEDURE — 6370000000 HC RX 637 (ALT 250 FOR IP): Performed by: NURSE PRACTITIONER

## 2025-03-18 PROCEDURE — 6370000000 HC RX 637 (ALT 250 FOR IP): Performed by: INTERNAL MEDICINE

## 2025-03-18 PROCEDURE — 2580000003 HC RX 258: Performed by: INTERNAL MEDICINE

## 2025-03-18 PROCEDURE — 93306 TTE W/DOPPLER COMPLETE: CPT | Performed by: INTERNAL MEDICINE

## 2025-03-18 PROCEDURE — 6370000000 HC RX 637 (ALT 250 FOR IP): Performed by: SURGERY

## 2025-03-18 PROCEDURE — 84439 ASSAY OF FREE THYROXINE: CPT

## 2025-03-18 PROCEDURE — APPSS60 APP SPLIT SHARED TIME 46-60 MINUTES: Performed by: CLINICAL NURSE SPECIALIST

## 2025-03-18 PROCEDURE — 3430000000 HC RX DIAGNOSTIC RADIOPHARMACEUTICAL: Performed by: RADIOLOGY

## 2025-03-18 PROCEDURE — 84443 ASSAY THYROID STIM HORMONE: CPT

## 2025-03-18 PROCEDURE — A9500 TC99M SESTAMIBI: HCPCS | Performed by: RADIOLOGY

## 2025-03-18 PROCEDURE — 93306 TTE W/DOPPLER COMPLETE: CPT

## 2025-03-18 PROCEDURE — 78452 HT MUSCLE IMAGE SPECT MULT: CPT

## 2025-03-18 PROCEDURE — 36415 COLL VENOUS BLD VENIPUNCTURE: CPT

## 2025-03-18 PROCEDURE — 83036 HEMOGLOBIN GLYCOSYLATED A1C: CPT

## 2025-03-18 PROCEDURE — 83735 ASSAY OF MAGNESIUM: CPT

## 2025-03-18 PROCEDURE — 6360000002 HC RX W HCPCS: Performed by: INTERNAL MEDICINE

## 2025-03-18 RX ORDER — TETRAKIS(2-METHOXYISOBUTYLISOCYANIDE)COPPER(I) TETRAFLUOROBORATE 1 MG/ML
10 INJECTION, POWDER, LYOPHILIZED, FOR SOLUTION INTRAVENOUS
Status: COMPLETED | OUTPATIENT
Start: 2025-03-18 | End: 2025-03-18

## 2025-03-18 RX ORDER — REGADENOSON 0.08 MG/ML
0.4 INJECTION, SOLUTION INTRAVENOUS
Status: COMPLETED | OUTPATIENT
Start: 2025-03-18 | End: 2025-03-18

## 2025-03-18 RX ORDER — TETRAKIS(2-METHOXYISOBUTYLISOCYANIDE)COPPER(I) TETRAFLUOROBORATE 1 MG/ML
30 INJECTION, POWDER, LYOPHILIZED, FOR SOLUTION INTRAVENOUS
Status: COMPLETED | OUTPATIENT
Start: 2025-03-18 | End: 2025-03-18

## 2025-03-18 RX ADMIN — SODIUM CHLORIDE, PRESERVATIVE FREE 10 ML: 5 INJECTION INTRAVENOUS at 09:12

## 2025-03-18 RX ADMIN — REGADENOSON 0.4 MG: 0.08 INJECTION, SOLUTION INTRAVENOUS at 14:00

## 2025-03-18 RX ADMIN — DOCUSATE SODIUM 100 MG: 100 CAPSULE, LIQUID FILLED ORAL at 20:50

## 2025-03-18 RX ADMIN — ARFORMOTEROL TARTRATE 15 MCG: 15 SOLUTION RESPIRATORY (INHALATION) at 08:50

## 2025-03-18 RX ADMIN — PIPERACILLIN AND TAZOBACTAM 3375 MG: 3; .375 INJECTION, POWDER, LYOPHILIZED, FOR SOLUTION INTRAVENOUS at 16:11

## 2025-03-18 RX ADMIN — SODIUM CHLORIDE 10 ML: 9 INJECTION INTRAMUSCULAR; INTRAVENOUS; SUBCUTANEOUS at 05:31

## 2025-03-18 RX ADMIN — TAMSULOSIN HYDROCHLORIDE 0.4 MG: 0.4 CAPSULE ORAL at 08:25

## 2025-03-18 RX ADMIN — PIPERACILLIN AND TAZOBACTAM 3375 MG: 3; .375 INJECTION, POWDER, LYOPHILIZED, FOR SOLUTION INTRAVENOUS at 05:31

## 2025-03-18 RX ADMIN — DOCUSATE SODIUM 100 MG: 100 CAPSULE, LIQUID FILLED ORAL at 08:24

## 2025-03-18 RX ADMIN — Medication 30 MILLICURIE: at 12:27

## 2025-03-18 RX ADMIN — HYDROXYZINE PAMOATE 25 MG: 25 CAPSULE ORAL at 09:10

## 2025-03-18 RX ADMIN — MORPHINE SULFATE 2 MG: 2 INJECTION, SOLUTION INTRAMUSCULAR; INTRAVENOUS at 09:12

## 2025-03-18 RX ADMIN — Medication 3 MG: at 00:39

## 2025-03-18 RX ADMIN — HYDROXYZINE PAMOATE 25 MG: 25 CAPSULE ORAL at 16:15

## 2025-03-18 RX ADMIN — SODIUM CHLORIDE, PRESERVATIVE FREE 10 ML: 5 INJECTION INTRAVENOUS at 08:25

## 2025-03-18 RX ADMIN — SODIUM CHLORIDE 10 ML: 9 INJECTION INTRAMUSCULAR; INTRAVENOUS; SUBCUTANEOUS at 22:16

## 2025-03-18 RX ADMIN — SODIUM CHLORIDE 10 ML: 9 INJECTION INTRAMUSCULAR; INTRAVENOUS; SUBCUTANEOUS at 16:11

## 2025-03-18 RX ADMIN — ONDANSETRON 4 MG: 2 INJECTION INTRAMUSCULAR; INTRAVENOUS at 11:12

## 2025-03-18 RX ADMIN — BUPRENORPHINE HYDROCHLORIDE AND NALOXONE HYDROCHLORIDE DIHYDRATE 2 TABLET: 8; 2 TABLET SUBLINGUAL at 08:24

## 2025-03-18 RX ADMIN — MIRTAZAPINE 30 MG: 15 TABLET, FILM COATED ORAL at 20:49

## 2025-03-18 RX ADMIN — IPRATROPIUM BROMIDE 0.5 MG: 0.5 SOLUTION RESPIRATORY (INHALATION) at 21:28

## 2025-03-18 RX ADMIN — SODIUM CHLORIDE 10 ML: 9 INJECTION INTRAMUSCULAR; INTRAVENOUS; SUBCUTANEOUS at 11:21

## 2025-03-18 RX ADMIN — MORPHINE SULFATE 2 MG: 2 INJECTION, SOLUTION INTRAMUSCULAR; INTRAVENOUS at 16:11

## 2025-03-18 RX ADMIN — BUPRENORPHINE HYDROCHLORIDE AND NALOXONE HYDROCHLORIDE DIHYDRATE 1 TABLET: 8; 2 TABLET SUBLINGUAL at 20:49

## 2025-03-18 RX ADMIN — IPRATROPIUM BROMIDE 0.5 MG: 0.5 SOLUTION RESPIRATORY (INHALATION) at 08:50

## 2025-03-18 RX ADMIN — PIPERACILLIN AND TAZOBACTAM 3375 MG: 3; .375 INJECTION, POWDER, LYOPHILIZED, FOR SOLUTION INTRAVENOUS at 11:21

## 2025-03-18 RX ADMIN — ARFORMOTEROL TARTRATE 15 MCG: 15 SOLUTION RESPIRATORY (INHALATION) at 21:28

## 2025-03-18 RX ADMIN — SODIUM CHLORIDE, PRESERVATIVE FREE 10 ML: 5 INJECTION INTRAVENOUS at 20:50

## 2025-03-18 RX ADMIN — PIPERACILLIN AND TAZOBACTAM 3375 MG: 3; .375 INJECTION, POWDER, LYOPHILIZED, FOR SOLUTION INTRAVENOUS at 22:16

## 2025-03-18 RX ADMIN — Medication 10 MILLICURIE: at 12:27

## 2025-03-18 ASSESSMENT — PAIN SCALES - GENERAL
PAINLEVEL_OUTOF10: 3
PAINLEVEL_OUTOF10: 2
PAINLEVEL_OUTOF10: 9

## 2025-03-18 ASSESSMENT — PAIN DESCRIPTION - ORIENTATION
ORIENTATION: LOWER

## 2025-03-18 ASSESSMENT — PAIN DESCRIPTION - LOCATION
LOCATION: BACK;ABDOMEN
LOCATION: ABDOMEN;BACK
LOCATION: ABDOMEN;BACK

## 2025-03-18 ASSESSMENT — PAIN DESCRIPTION - DESCRIPTORS
DESCRIPTORS: ACHING;DISCOMFORT;DULL
DESCRIPTORS: DULL;DISCOMFORT;ACHING
DESCRIPTORS: ACHING;DISCOMFORT;DULL

## 2025-03-18 ASSESSMENT — PAIN DESCRIPTION - PAIN TYPE
TYPE: ACUTE PAIN
TYPE: ACUTE PAIN

## 2025-03-18 ASSESSMENT — PAIN - FUNCTIONAL ASSESSMENT
PAIN_FUNCTIONAL_ASSESSMENT: ACTIVITIES ARE NOT PREVENTED

## 2025-03-18 NOTE — ACP (ADVANCE CARE PLANNING)
Advance Care Planning   Healthcare Decision Maker:    Primary Decision Maker: Mathew Johnson A - Child - [phone]    Click here to complete Healthcare Decision Makers including selection of the Healthcare Decision Maker Relationship (ie \"Primary\").

## 2025-03-18 NOTE — PROGRESS NOTES
Confirmed with [facility] the patient has Norvasc 5mg daily and Tamulosin 0.4mg daily.

## 2025-03-18 NOTE — CARE COORDINATION
Social Work:    Jillian at [facility] accepted Yevgeniy's referral. Social work prompted for [facility] orders for nursing and colostomy care. Social work to continue to follow.    Electronically signed by MAYTE Langley on 3/18/2025 at 9:53 AM

## 2025-03-18 NOTE — PROGRESS NOTES
General Surgery   Daily Progress Note      Patient's Name/Date of Birth: Yevgeniy Johnson / 1960    Date: March 18, 2025       Subjective: No issues overnight. Says abdominal pain is ok. Tolerating a diet     Objective:  /69   Pulse 85   Temp 97.5 °F (36.4 °C) (Temporal)   Resp 16   Ht 1.778 m (5' 10\")   Wt 94.7 kg (208 lb 12.4 oz)   SpO2 96%   BMI 29.96 kg/m²   Labs:  Recent Labs     03/15/25  1607 03/16/25  0440 03/17/25  0545   WBC 6.6 6.3 7.8   HGB 13.2 12.6 13.1   HCT 40.2 38.3 40.9     Recent Labs     03/15/25  1607 03/16/25  0440 03/17/25  0545    139 139   K 4.0 4.0 4.0   CL 97* 102 102   CO2 28 25 26   BUN 9 8 8   CREATININE 0.6* 0.6* 0.8     Recent Labs     03/15/25  1607 03/16/25  0440 03/17/25  0545   ALKPHOS 74 68 77   ALT 16 15 14   AST 14 12 14   BILITOT 0.4 0.4 0.3         General appearance: NAD  Head: NCAT, PERRL, EOMI, pink conjunctiva  Neck: supple, no masses  Lungs: symmetric chest rise, no audible wheezes  Heart: warm throughout  Abdomen: soft, non-distended, non-tender to palpation throughout  Skin: no visible lesions  Extremities: extremities normal, atraumatic, no cyanosis or edema      Assessment/Plan:  64 y.o. male with colovesical fistula and diverticular abscess     - C/w raphael catheter  - c/w abx  - C/w low fiber diet  - NPOM  - will plan for robotic colectomy, colostomy tmrw w Dr. Barrett  - Cardiac risk stratification needed      Gale Ramirez DO  General Surgery Resident    ATTENDING PHYSICIAN PROGRESS NOTE     Diagnosis of   External Notes reviewed including   CBC, CMP personally reviewed  Will order   CT was personally evaluated  Continue with IV management for pain   Discussed case with family/physician    I personally examined the patient, reviewed the records (including other consultation but not limited to them) and discussed the case with the resident. I personally reviewed all relevant labs and imaging data. Please refer to the resident's

## 2025-03-18 NOTE — PLAN OF CARE
Problem: Discharge Planning  Goal: Discharge to home or other facility with appropriate resources  3/17/2025 2114 by Mecca Pascual RN  Outcome: Progressing  3/17/2025 0936 by Laury Haley RN  Outcome: Progressing     Problem: Pain  Goal: Verbalizes/displays adequate comfort level or baseline comfort level  3/17/2025 2114 by Mecca Pascual RN  Outcome: Progressing  3/17/2025 0936 by Laury Haley RN  Outcome: Progressing     Problem: ABCDS Injury Assessment  Goal: Absence of physical injury  3/17/2025 2114 by Mecca Pascual RN  Outcome: Progressing  3/17/2025 0936 by Laury Haley RN  Outcome: Progressing     Problem: Safety - Adult  Goal: Free from fall injury  3/17/2025 2114 by Mecca Pascual RN  Outcome: Progressing  3/17/2025 0936 by Laury Haley RN  Outcome: Progressing

## 2025-03-18 NOTE — PROGRESS NOTES
Consult for OR tomorrow. Dr. Barrett  Alert and oriented. Does not want to sit or stand for marking due to leakage.  Bilateral lower quads marked. Abd distended  Initial ostomy education given  Will follow  Tatyana Mcgrath, CNS, Wound Care

## 2025-03-18 NOTE — CARE COORDINATION
Social Work:    Chart reviewed.  Patient has a known colovesicular fistula. He is scheduled for a Laparoscopic, colon resection, ostomy creation, tomorrow.  Social service met with Yevgeniy, advised him about social service role and discussed discharge planning. Yevgeniy is a patient of Dr. Tanvir Levy and he uses [facility] on [address].  He resides alone independently in an apartment with no entry steps and his son Mathew is his primary contact. Yevgeniy expects to discharge home with [facility]. Choices for home care was provided and Yevgeniy has no HHC preference. Social service called a referral to Jillian at [facility] for home nursing and ostomy care. ([facility] ORDERS NEEDED)    Electronically signed by MAYTE Langley on 3/18/2025 at 9:49 AM

## 2025-03-18 NOTE — PROGRESS NOTES
[facility]  [facility]  Progress Note    SUBJECTIVE:  Chief Complaint   Patient presents with    Constipation     X3 days    Shortness of Breath    Nasal Congestion    Abdominal Pain     Mid lower abd pain      Patient is tolerating medications. No reported adverse drug reactions.      Review of systems:  As stated above in the chief complaint, otherwise negative.    Medications:  Scheduled Meds:   buprenorphine-naloxone  2 tablet SubLINGual Daily    buprenorphine-naloxone  1 tablet SubLINGual QHS    docusate sodium  100 mg Oral BID    bisacodyl  10 mg Rectal Once    piperacillin-tazobactam  3,375 mg IntraVENous Q6H    And    sodium chloride (PF)  10 mL IntraVENous Q6H    nicotine  1 patch TransDERmal Daily    mirtazapine  30 mg Oral Nightly    tamsulosin  0.4 mg Oral Daily    sodium chloride flush  10 mL IntraVENous 2 times per day    arformoterol tartrate  15 mcg Nebulization BID RT    And    ipratropium  0.5 mg Nebulization BID     Continuous Infusions:   sodium chloride       PRN Meds:hydrOXYzine pamoate, polyethylene glycol, morphine, traZODone, albuterol, sodium chloride flush, sodium chloride, potassium chloride **OR** potassium alternative oral replacement **OR** potassium chloride, magnesium sulfate, ondansetron **OR** ondansetron, senna, acetaminophen **OR** acetaminophen, melatonin, fluticasone    OBJECTIVE:  /74   Pulse 81   Temp 98 °F (36.7 °C) (Tympanic)   Resp 16   Ht 1.778 m (5' 10\")   Wt 94.7 kg (208 lb 12.4 oz)   SpO2 96%   BMI 29.96 kg/m²   Temp  Av.8 °F (36.6 °C)  Min: 97.5 °F (36.4 °C)  Max: 98 °F (36.7 °C)  Constitutional: The patient is sitting up in bed.  No distress.  Resting comfortably.  Skin: Warm and dry. No rashes were noted.   HEENT: Round and reactive pupils.  Moist mucous membranes.  No ulcerations or thrush.  Neck: Supple to movements.   Chest: No use of accessory muscles to breathe. Symmetrical expansion.  Clear

## 2025-03-18 NOTE — CONSULTS
Inpatient Cardiology Consultation      Reason for Consult: \"Cardiac clearance planned OR 3/19/2025\"    Consulting Physician: Dr. Lara     Requesting Physician:  Dr. Carlson    Date of Consultation: 3/18/2025    HISTORY OF PRESENT ILLNESS:   Yevgeniy Johnson  is a 64 y.o.  male not previously known to [facility] cardiology.  Review of records shows that he was seen by cardiology at [facility] in 2017 full records requested    PMH: see below    ED 3/15/2025 walk-in for abdominal pain with constipation for 3 days, shortness of breath and congestion /knowncolovesical fistula scheduled to undergo surgery with Dr. Barrett on April 14   Arrival vitals: /92 HR 78 SpO2 99% on room air afebrile  Significant Labs: Troponin 7.  NT proBNP 168.  BUN 6 CR 0.8 K4.1 lactic acid 0.7 albumin 3.4 remaining LFT WNL WBC 6.4 Hgb 13.2-12.5   UA positive for glucose, protein 3+ bacteria >> blood and urine cultures are pending  Full respiratory panel negative  RAD: 2 view chest x-ray: Mild increased markings present no acute CP process  CT of abdomen and pelvis with IV contrast: Lower chest groundglass opacities in right middle lobe with bronchiectatic change and tree-in-bud opacities suggests inflammatory change  /abdominal abscess/ colovesical fistula that appears to communicate with the sigmoid lumen /cystitis/cholelithiasis/mild central duct dilatation/diverticulosis/small fixed hiatal hernia  ED treatment: Zosyn, Zofran  Plan for robotic colectomy, colostomy Dr. Barrett 3/19/25  Patient seen and examined.  Recent vitals: /74 HR 81 SpO2 96% on room air afebrile weight 208 pounds BMI 30  I & O thus far -3.5L    Poor historian.  Ambulates with cane. No steps in home. Drives.  Uses scooter in stores.  When he does walk, no CP or FRANK.  No Palpitations or dizziness.  Sleeps in bed on 1.5 pillows with no orthopnea. Intermittent PND that he attributes to stuffy nose with deviated septum .  + productive cough clear sputum.  7 03/15/2025 04:07 PM     LIPID PANEL:  No results found for: \"CHOL\", \"TRIG\", \"HDL\", \"LDL\", \"VLDL\", \"CHOLHDLRATIO\"    LIVER PROFILE:  Recent Labs     03/17/25  0545 03/18/25  0709   AST 14 10   ALT 14 12         Assessment/ Plan: as per Dr Lara - addendum to follow         Mae DEL CASTILLO[facility]     Electronically signed by STUART Parham on 3/18/2025 at 8:26 AM

## 2025-03-18 NOTE — PROGRESS NOTES
softly distended, mild suprapubic tenderness, normal bowel sounds, raphael intact draining feculent material  Extremities: atraumatic, no edema  Neurologic: following commands, speech is clear    Most Recent Labs  Lab Results   Component Value Date    WBC 7.8 03/17/2025    HGB 13.1 03/17/2025    HCT 40.9 03/17/2025     03/17/2025     03/17/2025    K 4.0 03/17/2025     03/17/2025    CREATININE 0.8 03/17/2025    BUN 8 03/17/2025    CO2 26 03/17/2025    GLUCOSE 108 (H) 03/17/2025    ALT 14 03/17/2025    AST 14 03/17/2025       CT ABDOMEN PELVIS W IV CONTRAST Additional Contrast? None   Final Result   1. There is a persistent extraluminal gas and fluid collection in the pre   vesicle space concerning for an abscess. This has increased in size when   compared the previous study.   2. There is a colovesical fistula that appears to communicate with the   sigmoid lumen.   3. There is diffuse mucosal thickening of the bladder concerning for cystitis.   4. Cholelithiasis.   5. Mild central duct dilation.   6. Diverticulosis.   7. Small fixed hiatus hernia.   8. There are ground-glass opacities within the right middle lobe with   bronchiectatic change and tree-in-bud opacities suggests inflammatory change.   This is not significantly improved when compared the previous study.         XR CHEST (2 VW)   Final Result   Mild increased markings present. No acute cardiopulmonary process.             Assessment   Active Hospital Problems    Diagnosis     COPD (chronic obstructive pulmonary disease) ([facility]) [J44.9]     Pelvic abscess in male ([facility]) [K65.1]     Hypertension [I10]          Plan  Colovesical Fistula with Diverticular Abscess  CT abdomen/pelvis noted -- originally scheduled for OR next month  Continue raphael catheter at this time per Surgery recommendations  Continue IV Zosyn per ID  Remains NPO at this time -- defer diet to surgery  IV Morphine as needed for pain control  General Surgery and ID consults  appreciated    ?COVID-19  Rapid COVID indeterminate   Full viral respiratory panel negative   No indication for Remdesivir, Baricitinib or Decadron    BPH with Urinary Retention  Continue Flomax  Jorge is in place at this time per surgery given fistula/diverticular abscess    Generalized Anxiety Disorder  Continue Remeron  Trial Vistaril    Nicotine Use Disorder  Counseling and cessation advised  Continue Nicotine patch    Hx Opiate Abuse  Continue Suboxone as at home    Follow labs   DVT prophylaxis  Please see orders for further management and care.  Discharge plan: likely return home when stable pending final plans from surgery    The pertinent details of this case were discussed with Dr. Palm.    Electronically signed by STUART Ji CNP on 3/18/2025 at 7:01 AM

## 2025-03-19 ENCOUNTER — ANESTHESIA (OUTPATIENT)
Dept: OPERATING ROOM | Age: 65
End: 2025-03-19
Payer: MEDICARE

## 2025-03-19 LAB
ALBUMIN SERPL-MCNC: 3.5 G/DL (ref 3.5–5.2)
ALP SERPL-CCNC: 75 U/L (ref 40–129)
ALT SERPL-CCNC: 12 U/L (ref 0–40)
ANION GAP SERPL CALCULATED.3IONS-SCNC: 9 MMOL/L (ref 7–16)
AST SERPL-CCNC: 10 U/L (ref 0–39)
BASOPHILS # BLD: 0.08 K/UL (ref 0–0.2)
BASOPHILS NFR BLD: 1 % (ref 0–2)
BILIRUB SERPL-MCNC: 0.3 MG/DL (ref 0–1.2)
BUN SERPL-MCNC: 6 MG/DL (ref 6–23)
CALCIUM SERPL-MCNC: 9 MG/DL (ref 8.6–10.2)
CHLORIDE SERPL-SCNC: 106 MMOL/L (ref 98–107)
CO2 SERPL-SCNC: 27 MMOL/L (ref 22–29)
CREAT SERPL-MCNC: 0.8 MG/DL (ref 0.7–1.2)
ECHO BSA: 2.19 M2
EOSINOPHIL # BLD: 0.23 K/UL (ref 0.05–0.5)
EOSINOPHILS RELATIVE PERCENT: 3 % (ref 0–6)
ERYTHROCYTE [DISTWIDTH] IN BLOOD BY AUTOMATED COUNT: 13 % (ref 11.5–15)
GFR, ESTIMATED: >90 ML/MIN/1.73M2
GLUCOSE SERPL-MCNC: 106 MG/DL (ref 74–99)
HCT VFR BLD AUTO: 40.9 % (ref 37–54)
HGB BLD-MCNC: 13.1 G/DL (ref 12.5–16.5)
IMM GRANULOCYTES # BLD AUTO: <0.03 K/UL (ref 0–0.58)
IMM GRANULOCYTES NFR BLD: 0 % (ref 0–5)
LYMPHOCYTES NFR BLD: 1.78 K/UL (ref 1.5–4)
LYMPHOCYTES RELATIVE PERCENT: 24 % (ref 20–42)
MCH RBC QN AUTO: 29.5 PG (ref 26–35)
MCHC RBC AUTO-ENTMCNC: 32 G/DL (ref 32–34.5)
MCV RBC AUTO: 92.1 FL (ref 80–99.9)
MONOCYTES NFR BLD: 0.95 K/UL (ref 0.1–0.95)
MONOCYTES NFR BLD: 13 % (ref 2–12)
NEUTROPHILS NFR BLD: 58 % (ref 43–80)
NEUTS SEG NFR BLD: 4.25 K/UL (ref 1.8–7.3)
NUC STRESS EJECTION FRACTION: 70 %
PLATELET # BLD AUTO: 380 K/UL (ref 130–450)
PMV BLD AUTO: 8.8 FL (ref 7–12)
POTASSIUM SERPL-SCNC: 4.2 MMOL/L (ref 3.5–5)
PROT SERPL-MCNC: 6.7 G/DL (ref 6.4–8.3)
RBC # BLD AUTO: 4.44 M/UL (ref 3.8–5.8)
SODIUM SERPL-SCNC: 142 MMOL/L (ref 132–146)
STRESS BASELINE DIAS BP: 62 MMHG
STRESS BASELINE HR: 83 BPM
STRESS BASELINE SYS BP: 128 MMHG
STRESS ESTIMATED WORKLOAD: 1 METS
STRESS PEAK DIAS BP: 68 MMHG
STRESS PEAK SYS BP: 136 MMHG
STRESS PERCENT HR ACHIEVED: 70 %
STRESS POST PEAK HR: 109 BPM
STRESS RATE PRESSURE PRODUCT: NORMAL BPM*MMHG
STRESS TARGET HR: 155 BPM
TID: 1.08
WBC OTHER # BLD: 7.3 K/UL (ref 4.5–11.5)

## 2025-03-19 PROCEDURE — 6370000000 HC RX 637 (ALT 250 FOR IP): Performed by: INTERNAL MEDICINE

## 2025-03-19 PROCEDURE — 0DTF0ZZ RESECTION OF RIGHT LARGE INTESTINE, OPEN APPROACH: ICD-10-PCS | Performed by: STUDENT IN AN ORGANIZED HEALTH CARE EDUCATION/TRAINING PROGRAM

## 2025-03-19 PROCEDURE — 6360000002 HC RX W HCPCS: Performed by: STUDENT IN AN ORGANIZED HEALTH CARE EDUCATION/TRAINING PROGRAM

## 2025-03-19 PROCEDURE — 2709999900 HC NON-CHARGEABLE SUPPLY: Performed by: SURGERY

## 2025-03-19 PROCEDURE — 7100000001 HC PACU RECOVERY - ADDTL 15 MIN: Performed by: SURGERY

## 2025-03-19 PROCEDURE — 1200000000 HC SEMI PRIVATE

## 2025-03-19 PROCEDURE — 2580000003 HC RX 258

## 2025-03-19 PROCEDURE — 93018 CV STRESS TEST I&R ONLY: CPT | Performed by: INTERNAL MEDICINE

## 2025-03-19 PROCEDURE — 6360000002 HC RX W HCPCS: Performed by: NURSE PRACTITIONER

## 2025-03-19 PROCEDURE — 6370000000 HC RX 637 (ALT 250 FOR IP): Performed by: STUDENT IN AN ORGANIZED HEALTH CARE EDUCATION/TRAINING PROGRAM

## 2025-03-19 PROCEDURE — 3600000012 HC SURGERY LEVEL 2 ADDTL 15MIN: Performed by: SURGERY

## 2025-03-19 PROCEDURE — 6370000000 HC RX 637 (ALT 250 FOR IP): Performed by: NURSE PRACTITIONER

## 2025-03-19 PROCEDURE — 88307 TISSUE EXAM BY PATHOLOGIST: CPT

## 2025-03-19 PROCEDURE — C1769 GUIDE WIRE: HCPCS | Performed by: SURGERY

## 2025-03-19 PROCEDURE — 6360000002 HC RX W HCPCS

## 2025-03-19 PROCEDURE — 0T9B80Z DRAINAGE OF BLADDER WITH DRAINAGE DEVICE, VIA NATURAL OR ARTIFICIAL OPENING ENDOSCOPIC: ICD-10-PCS | Performed by: STUDENT IN AN ORGANIZED HEALTH CARE EDUCATION/TRAINING PROGRAM

## 2025-03-19 PROCEDURE — 6370000000 HC RX 637 (ALT 250 FOR IP): Performed by: SURGERY

## 2025-03-19 PROCEDURE — 87081 CULTURE SCREEN ONLY: CPT

## 2025-03-19 PROCEDURE — 2500000003 HC RX 250 WO HCPCS: Performed by: NURSE PRACTITIONER

## 2025-03-19 PROCEDURE — 6360000002 HC RX W HCPCS: Performed by: INTERNAL MEDICINE

## 2025-03-19 PROCEDURE — 2580000003 HC RX 258: Performed by: STUDENT IN AN ORGANIZED HEALTH CARE EDUCATION/TRAINING PROGRAM

## 2025-03-19 PROCEDURE — 93016 CV STRESS TEST SUPVJ ONLY: CPT | Performed by: INTERNAL MEDICINE

## 2025-03-19 PROCEDURE — 2500000003 HC RX 250 WO HCPCS: Performed by: STUDENT IN AN ORGANIZED HEALTH CARE EDUCATION/TRAINING PROGRAM

## 2025-03-19 PROCEDURE — 80053 COMPREHEN METABOLIC PANEL: CPT

## 2025-03-19 PROCEDURE — 2580000003 HC RX 258: Performed by: INTERNAL MEDICINE

## 2025-03-19 PROCEDURE — 2720000010 HC SURG SUPPLY STERILE: Performed by: SURGERY

## 2025-03-19 PROCEDURE — 6360000002 HC RX W HCPCS: Performed by: SURGERY

## 2025-03-19 PROCEDURE — 2500000003 HC RX 250 WO HCPCS

## 2025-03-19 PROCEDURE — 3700000001 HC ADD 15 MINUTES (ANESTHESIA): Performed by: SURGERY

## 2025-03-19 PROCEDURE — 0DTN0ZZ RESECTION OF SIGMOID COLON, OPEN APPROACH: ICD-10-PCS | Performed by: STUDENT IN AN ORGANIZED HEALTH CARE EDUCATION/TRAINING PROGRAM

## 2025-03-19 PROCEDURE — 3700000000 HC ANESTHESIA ATTENDED CARE: Performed by: SURGERY

## 2025-03-19 PROCEDURE — 3600000002 HC SURGERY LEVEL 2 BASE: Performed by: SURGERY

## 2025-03-19 PROCEDURE — 0D1M0Z4 BYPASS DESCENDING COLON TO CUTANEOUS, OPEN APPROACH: ICD-10-PCS | Performed by: STUDENT IN AN ORGANIZED HEALTH CARE EDUCATION/TRAINING PROGRAM

## 2025-03-19 PROCEDURE — 36415 COLL VENOUS BLD VENIPUNCTURE: CPT

## 2025-03-19 PROCEDURE — 7100000000 HC PACU RECOVERY - FIRST 15 MIN: Performed by: SURGERY

## 2025-03-19 PROCEDURE — 94640 AIRWAY INHALATION TREATMENT: CPT

## 2025-03-19 PROCEDURE — 85025 COMPLETE CBC W/AUTO DIFF WBC: CPT

## 2025-03-19 PROCEDURE — 2700000000 HC OXYGEN THERAPY PER DAY

## 2025-03-19 RX ORDER — SODIUM CHLORIDE, SODIUM LACTATE, POTASSIUM CHLORIDE, CALCIUM CHLORIDE 600; 310; 30; 20 MG/100ML; MG/100ML; MG/100ML; MG/100ML
INJECTION, SOLUTION INTRAVENOUS
Status: DISCONTINUED | OUTPATIENT
Start: 2025-03-19 | End: 2025-03-19 | Stop reason: SDUPTHER

## 2025-03-19 RX ORDER — MEPERIDINE HYDROCHLORIDE 50 MG/ML
12.5 INJECTION INTRAMUSCULAR; INTRAVENOUS; SUBCUTANEOUS
Status: DISCONTINUED | OUTPATIENT
Start: 2025-03-19 | End: 2025-03-19 | Stop reason: HOSPADM

## 2025-03-19 RX ORDER — DEXMEDETOMIDINE HYDROCHLORIDE 100 UG/ML
INJECTION, SOLUTION INTRAVENOUS
Status: DISCONTINUED | OUTPATIENT
Start: 2025-03-19 | End: 2025-03-19 | Stop reason: SDUPTHER

## 2025-03-19 RX ORDER — METHOCARBAMOL 500 MG/1
1000 TABLET, FILM COATED ORAL 4 TIMES DAILY
Status: DISCONTINUED | OUTPATIENT
Start: 2025-03-19 | End: 2025-03-28 | Stop reason: HOSPADM

## 2025-03-19 RX ORDER — OXYCODONE HYDROCHLORIDE 5 MG/1
5 TABLET ORAL EVERY 4 HOURS PRN
Status: DISCONTINUED | OUTPATIENT
Start: 2025-03-19 | End: 2025-03-28 | Stop reason: HOSPADM

## 2025-03-19 RX ORDER — SODIUM CHLORIDE 0.9 % (FLUSH) 0.9 %
5-40 SYRINGE (ML) INJECTION PRN
Status: DISCONTINUED | OUTPATIENT
Start: 2025-03-19 | End: 2025-03-19 | Stop reason: HOSPADM

## 2025-03-19 RX ORDER — NALOXONE HYDROCHLORIDE 0.4 MG/ML
INJECTION, SOLUTION INTRAMUSCULAR; INTRAVENOUS; SUBCUTANEOUS PRN
Status: DISCONTINUED | OUTPATIENT
Start: 2025-03-19 | End: 2025-03-19 | Stop reason: HOSPADM

## 2025-03-19 RX ORDER — NEOSTIGMINE METHYLSULFATE 1 MG/ML
INJECTION, SOLUTION INTRAVENOUS
Status: DISCONTINUED | OUTPATIENT
Start: 2025-03-19 | End: 2025-03-19 | Stop reason: SDUPTHER

## 2025-03-19 RX ORDER — DIPHENHYDRAMINE HYDROCHLORIDE 50 MG/ML
12.5 INJECTION, SOLUTION INTRAMUSCULAR; INTRAVENOUS
Status: DISCONTINUED | OUTPATIENT
Start: 2025-03-19 | End: 2025-03-19 | Stop reason: HOSPADM

## 2025-03-19 RX ORDER — ONDANSETRON 2 MG/ML
4 INJECTION INTRAMUSCULAR; INTRAVENOUS
Status: COMPLETED | OUTPATIENT
Start: 2025-03-19 | End: 2025-03-19

## 2025-03-19 RX ORDER — ACETAMINOPHEN 325 MG/1
650 TABLET ORAL EVERY 6 HOURS SCHEDULED
Status: DISCONTINUED | OUTPATIENT
Start: 2025-03-19 | End: 2025-03-28 | Stop reason: HOSPADM

## 2025-03-19 RX ORDER — SODIUM CHLORIDE, SODIUM LACTATE, POTASSIUM CHLORIDE, CALCIUM CHLORIDE 600; 310; 30; 20 MG/100ML; MG/100ML; MG/100ML; MG/100ML
INJECTION, SOLUTION INTRAVENOUS CONTINUOUS
Status: DISCONTINUED | OUTPATIENT
Start: 2025-03-19 | End: 2025-03-26

## 2025-03-19 RX ORDER — GLYCOPYRROLATE 0.2 MG/ML
INJECTION INTRAMUSCULAR; INTRAVENOUS
Status: DISCONTINUED | OUTPATIENT
Start: 2025-03-19 | End: 2025-03-19 | Stop reason: SDUPTHER

## 2025-03-19 RX ORDER — ROCURONIUM BROMIDE 10 MG/ML
INJECTION, SOLUTION INTRAVENOUS
Status: DISCONTINUED | OUTPATIENT
Start: 2025-03-19 | End: 2025-03-19 | Stop reason: SDUPTHER

## 2025-03-19 RX ORDER — LABETALOL HYDROCHLORIDE 5 MG/ML
INJECTION, SOLUTION INTRAVENOUS
Status: DISCONTINUED | OUTPATIENT
Start: 2025-03-19 | End: 2025-03-19 | Stop reason: SDUPTHER

## 2025-03-19 RX ORDER — LIDOCAINE HYDROCHLORIDE 20 MG/ML
INJECTION, SOLUTION EPIDURAL; INFILTRATION; INTRACAUDAL; PERINEURAL
Status: DISCONTINUED | OUTPATIENT
Start: 2025-03-19 | End: 2025-03-19 | Stop reason: SDUPTHER

## 2025-03-19 RX ORDER — PROPOFOL 10 MG/ML
INJECTION, EMULSION INTRAVENOUS
Status: DISCONTINUED | OUTPATIENT
Start: 2025-03-19 | End: 2025-03-19 | Stop reason: SDUPTHER

## 2025-03-19 RX ORDER — FENTANYL CITRATE 50 UG/ML
INJECTION, SOLUTION INTRAMUSCULAR; INTRAVENOUS
Status: DISCONTINUED | OUTPATIENT
Start: 2025-03-19 | End: 2025-03-19 | Stop reason: SDUPTHER

## 2025-03-19 RX ORDER — SODIUM CHLORIDE 9 MG/ML
INJECTION, SOLUTION INTRAVENOUS PRN
Status: DISCONTINUED | OUTPATIENT
Start: 2025-03-19 | End: 2025-03-28 | Stop reason: HOSPADM

## 2025-03-19 RX ORDER — OXYCODONE HYDROCHLORIDE 5 MG/1
10 TABLET ORAL EVERY 4 HOURS PRN
Status: DISCONTINUED | OUTPATIENT
Start: 2025-03-19 | End: 2025-03-28 | Stop reason: HOSPADM

## 2025-03-19 RX ORDER — SODIUM CHLORIDE 0.9 % (FLUSH) 0.9 %
5-40 SYRINGE (ML) INJECTION EVERY 12 HOURS SCHEDULED
Status: DISCONTINUED | OUTPATIENT
Start: 2025-03-19 | End: 2025-03-19 | Stop reason: HOSPADM

## 2025-03-19 RX ORDER — IPRATROPIUM BROMIDE AND ALBUTEROL SULFATE 2.5; .5 MG/3ML; MG/3ML
1 SOLUTION RESPIRATORY (INHALATION)
Status: DISCONTINUED | OUTPATIENT
Start: 2025-03-19 | End: 2025-03-19 | Stop reason: HOSPADM

## 2025-03-19 RX ORDER — ONDANSETRON 2 MG/ML
INJECTION INTRAMUSCULAR; INTRAVENOUS
Status: DISCONTINUED | OUTPATIENT
Start: 2025-03-19 | End: 2025-03-19 | Stop reason: SDUPTHER

## 2025-03-19 RX ORDER — SODIUM CHLORIDE 9 MG/ML
INJECTION, SOLUTION INTRAVENOUS
Status: DISCONTINUED | OUTPATIENT
Start: 2025-03-19 | End: 2025-03-19 | Stop reason: SDUPTHER

## 2025-03-19 RX ORDER — HYDROMORPHONE HYDROCHLORIDE 2 MG/ML
INJECTION, SOLUTION INTRAMUSCULAR; INTRAVENOUS; SUBCUTANEOUS
Status: DISCONTINUED | OUTPATIENT
Start: 2025-03-19 | End: 2025-03-19 | Stop reason: SDUPTHER

## 2025-03-19 RX ORDER — PIPERACILLIN SODIUM, TAZOBACTAM SODIUM 3; .375 G/15ML; G/15ML
3375 INJECTION, POWDER, LYOPHILIZED, FOR SOLUTION INTRAVENOUS EVERY 8 HOURS
Status: DISCONTINUED | OUTPATIENT
Start: 2025-03-19 | End: 2025-03-24 | Stop reason: SDUPTHER

## 2025-03-19 RX ORDER — MIDAZOLAM HYDROCHLORIDE 1 MG/ML
INJECTION, SOLUTION INTRAMUSCULAR; INTRAVENOUS
Status: DISCONTINUED | OUTPATIENT
Start: 2025-03-19 | End: 2025-03-19 | Stop reason: SDUPTHER

## 2025-03-19 RX ORDER — DEXAMETHASONE SODIUM PHOSPHATE 4 MG/ML
INJECTION, SOLUTION INTRA-ARTICULAR; INTRALESIONAL; INTRAMUSCULAR; INTRAVENOUS; SOFT TISSUE
Status: DISCONTINUED | OUTPATIENT
Start: 2025-03-19 | End: 2025-03-19 | Stop reason: SDUPTHER

## 2025-03-19 RX ORDER — LABETALOL HYDROCHLORIDE 5 MG/ML
5 INJECTION, SOLUTION INTRAVENOUS
Status: DISCONTINUED | OUTPATIENT
Start: 2025-03-19 | End: 2025-03-19 | Stop reason: HOSPADM

## 2025-03-19 RX ORDER — SODIUM CHLORIDE 9 MG/ML
10 INJECTION, SOLUTION INTRAMUSCULAR; INTRAVENOUS; SUBCUTANEOUS EVERY 6 HOURS
Status: DISCONTINUED | OUTPATIENT
Start: 2025-03-19 | End: 2025-03-24 | Stop reason: SDUPTHER

## 2025-03-19 RX ORDER — PROCHLORPERAZINE EDISYLATE 5 MG/ML
5 INJECTION INTRAMUSCULAR; INTRAVENOUS
Status: DISCONTINUED | OUTPATIENT
Start: 2025-03-19 | End: 2025-03-19 | Stop reason: HOSPADM

## 2025-03-19 RX ADMIN — SODIUM CHLORIDE: 9 INJECTION, SOLUTION INTRAVENOUS at 13:00

## 2025-03-19 RX ADMIN — PROPOFOL 180 MG: 10 INJECTION, EMULSION INTRAVENOUS at 13:07

## 2025-03-19 RX ADMIN — MIDAZOLAM 2 MG: 1 INJECTION INTRAMUSCULAR; INTRAVENOUS at 12:57

## 2025-03-19 RX ADMIN — HYDROMORPHONE HYDROCHLORIDE 0.25 MG: 1 INJECTION, SOLUTION INTRAMUSCULAR; INTRAVENOUS; SUBCUTANEOUS at 16:27

## 2025-03-19 RX ADMIN — SODIUM CHLORIDE, POTASSIUM CHLORIDE, SODIUM LACTATE AND CALCIUM CHLORIDE: 600; 310; 30; 20 INJECTION, SOLUTION INTRAVENOUS at 13:43

## 2025-03-19 RX ADMIN — DEXMEDETOMIDINE 2 MCG: 100 INJECTION, SOLUTION INTRAVENOUS at 15:01

## 2025-03-19 RX ADMIN — HYDROMORPHONE HYDROCHLORIDE 0.4 MG: 2 INJECTION, SOLUTION INTRAMUSCULAR; INTRAVENOUS; SUBCUTANEOUS at 13:43

## 2025-03-19 RX ADMIN — ARFORMOTEROL TARTRATE 15 MCG: 15 SOLUTION RESPIRATORY (INHALATION) at 21:32

## 2025-03-19 RX ADMIN — ONDANSETRON 4 MG: 2 INJECTION, SOLUTION INTRAMUSCULAR; INTRAVENOUS at 15:31

## 2025-03-19 RX ADMIN — DEXMEDETOMIDINE 2 MCG: 100 INJECTION, SOLUTION INTRAVENOUS at 15:14

## 2025-03-19 RX ADMIN — FENTANYL CITRATE 50 MCG: 50 INJECTION, SOLUTION INTRAMUSCULAR; INTRAVENOUS at 13:31

## 2025-03-19 RX ADMIN — FENTANYL CITRATE 50 MCG: 50 INJECTION, SOLUTION INTRAMUSCULAR; INTRAVENOUS at 12:57

## 2025-03-19 RX ADMIN — TAMSULOSIN HYDROCHLORIDE 0.4 MG: 0.4 CAPSULE ORAL at 09:03

## 2025-03-19 RX ADMIN — FENTANYL CITRATE 100 MCG: 50 INJECTION, SOLUTION INTRAMUSCULAR; INTRAVENOUS at 13:07

## 2025-03-19 RX ADMIN — OXYCODONE HYDROCHLORIDE 10 MG: 5 TABLET ORAL at 17:30

## 2025-03-19 RX ADMIN — HYDROMORPHONE HYDROCHLORIDE 0.4 MG: 2 INJECTION, SOLUTION INTRAMUSCULAR; INTRAVENOUS; SUBCUTANEOUS at 14:22

## 2025-03-19 RX ADMIN — FENTANYL CITRATE 50 MCG: 50 INJECTION, SOLUTION INTRAMUSCULAR; INTRAVENOUS at 13:37

## 2025-03-19 RX ADMIN — SODIUM CHLORIDE 10 ML: 9 INJECTION INTRAMUSCULAR; INTRAVENOUS; SUBCUTANEOUS at 04:55

## 2025-03-19 RX ADMIN — SODIUM CHLORIDE, POTASSIUM CHLORIDE, SODIUM LACTATE AND CALCIUM CHLORIDE: 600; 310; 30; 20 INJECTION, SOLUTION INTRAVENOUS at 17:31

## 2025-03-19 RX ADMIN — IPRATROPIUM BROMIDE 0.5 MG: 0.5 SOLUTION RESPIRATORY (INHALATION) at 08:44

## 2025-03-19 RX ADMIN — SODIUM CHLORIDE, PRESERVATIVE FREE 10 ML: 5 INJECTION INTRAVENOUS at 19:30

## 2025-03-19 RX ADMIN — HYDROMORPHONE HYDROCHLORIDE 0.5 MG: 1 INJECTION, SOLUTION INTRAMUSCULAR; INTRAVENOUS; SUBCUTANEOUS at 16:08

## 2025-03-19 RX ADMIN — OXYCODONE HYDROCHLORIDE 10 MG: 5 TABLET ORAL at 21:21

## 2025-03-19 RX ADMIN — ONDANSETRON 4 MG: 2 INJECTION, SOLUTION INTRAMUSCULAR; INTRAVENOUS at 14:41

## 2025-03-19 RX ADMIN — DEXMEDETOMIDINE 6 MCG: 100 INJECTION, SOLUTION INTRAVENOUS at 14:29

## 2025-03-19 RX ADMIN — DOCUSATE SODIUM 100 MG: 100 CAPSULE, LIQUID FILLED ORAL at 09:03

## 2025-03-19 RX ADMIN — HYDROMORPHONE HYDROCHLORIDE 0.4 MG: 2 INJECTION, SOLUTION INTRAMUSCULAR; INTRAVENOUS; SUBCUTANEOUS at 14:30

## 2025-03-19 RX ADMIN — PIPERACILLIN AND TAZOBACTAM 3375 MG: 3; .375 INJECTION, POWDER, LYOPHILIZED, FOR SOLUTION INTRAVENOUS at 04:55

## 2025-03-19 RX ADMIN — HYDROMORPHONE HYDROCHLORIDE 0.5 MG: 1 INJECTION, SOLUTION INTRAMUSCULAR; INTRAVENOUS; SUBCUTANEOUS at 15:28

## 2025-03-19 RX ADMIN — DEXAMETHASONE SODIUM PHOSPHATE 10 MG: 4 INJECTION, SOLUTION INTRAMUSCULAR; INTRAVENOUS at 13:12

## 2025-03-19 RX ADMIN — HYDROMORPHONE HYDROCHLORIDE 0.5 MG: 1 INJECTION, SOLUTION INTRAMUSCULAR; INTRAVENOUS; SUBCUTANEOUS at 22:37

## 2025-03-19 RX ADMIN — DEXMEDETOMIDINE 6 MCG: 100 INJECTION, SOLUTION INTRAVENOUS at 13:38

## 2025-03-19 RX ADMIN — LABETALOL HYDROCHLORIDE 10 MG: 5 INJECTION INTRAVENOUS at 14:30

## 2025-03-19 RX ADMIN — HYDROXYZINE PAMOATE 25 MG: 25 CAPSULE ORAL at 09:11

## 2025-03-19 RX ADMIN — PIPERACILLIN AND TAZOBACTAM 3375 MG: 3; .375 INJECTION, POWDER, LYOPHILIZED, FOR SOLUTION INTRAVENOUS at 19:46

## 2025-03-19 RX ADMIN — SODIUM CHLORIDE, PRESERVATIVE FREE 10 ML: 5 INJECTION INTRAVENOUS at 19:46

## 2025-03-19 RX ADMIN — HYDROMORPHONE HYDROCHLORIDE 0.4 MG: 2 INJECTION, SOLUTION INTRAMUSCULAR; INTRAVENOUS; SUBCUTANEOUS at 14:52

## 2025-03-19 RX ADMIN — IPRATROPIUM BROMIDE 0.5 MG: 0.5 SOLUTION RESPIRATORY (INHALATION) at 21:32

## 2025-03-19 RX ADMIN — Medication 3 MG: at 15:07

## 2025-03-19 RX ADMIN — PIPERACILLIN AND TAZOBACTAM 3375 MG: 3; .375 INJECTION, POWDER, LYOPHILIZED, FOR SOLUTION INTRAVENOUS at 13:19

## 2025-03-19 RX ADMIN — GLYCOPYRROLATE 0.6 MG: 0.2 INJECTION INTRAMUSCULAR; INTRAVENOUS at 15:07

## 2025-03-19 RX ADMIN — MIRTAZAPINE 30 MG: 15 TABLET, FILM COATED ORAL at 19:46

## 2025-03-19 RX ADMIN — ARFORMOTEROL TARTRATE 15 MCG: 15 SOLUTION RESPIRATORY (INHALATION) at 08:44

## 2025-03-19 RX ADMIN — METHOCARBAMOL TABLETS 1000 MG: 500 TABLET, COATED ORAL at 19:46

## 2025-03-19 RX ADMIN — ROCURONIUM BROMIDE 25 MG: 50 INJECTION INTRAVENOUS at 13:53

## 2025-03-19 RX ADMIN — BUPRENORPHINE HYDROCHLORIDE AND NALOXONE HYDROCHLORIDE DIHYDRATE 1 TABLET: 8; 2 TABLET SUBLINGUAL at 19:46

## 2025-03-19 RX ADMIN — ROCURONIUM BROMIDE 50 MG: 50 INJECTION INTRAVENOUS at 13:07

## 2025-03-19 RX ADMIN — LIDOCAINE HYDROCHLORIDE 100 MG: 20 INJECTION, SOLUTION EPIDURAL; INFILTRATION; INTRACAUDAL; PERINEURAL at 13:07

## 2025-03-19 RX ADMIN — HYDROMORPHONE HYDROCHLORIDE 0.25 MG: 1 INJECTION, SOLUTION INTRAMUSCULAR; INTRAVENOUS; SUBCUTANEOUS at 16:32

## 2025-03-19 RX ADMIN — BUPRENORPHINE HYDROCHLORIDE AND NALOXONE HYDROCHLORIDE DIHYDRATE 2 TABLET: 8; 2 TABLET SUBLINGUAL at 09:03

## 2025-03-19 RX ADMIN — SODIUM CHLORIDE, PRESERVATIVE FREE 10 ML: 5 INJECTION INTRAVENOUS at 09:03

## 2025-03-19 RX ADMIN — HYDROMORPHONE HYDROCHLORIDE 0.2 MG: 2 INJECTION, SOLUTION INTRAMUSCULAR; INTRAVENOUS; SUBCUTANEOUS at 14:56

## 2025-03-19 RX ADMIN — DEXMEDETOMIDINE 4 MCG: 100 INJECTION, SOLUTION INTRAVENOUS at 14:58

## 2025-03-19 RX ADMIN — ONDANSETRON 4 MG: 2 INJECTION INTRAMUSCULAR; INTRAVENOUS at 05:44

## 2025-03-19 RX ADMIN — HYDROMORPHONE HYDROCHLORIDE 0.2 MG: 2 INJECTION, SOLUTION INTRAMUSCULAR; INTRAVENOUS; SUBCUTANEOUS at 15:14

## 2025-03-19 RX ADMIN — HYDROMORPHONE HYDROCHLORIDE 0.5 MG: 1 INJECTION, SOLUTION INTRAMUSCULAR; INTRAVENOUS; SUBCUTANEOUS at 18:46

## 2025-03-19 ASSESSMENT — PAIN SCALES - GENERAL
PAINLEVEL_OUTOF10: 0
PAINLEVEL_OUTOF10: 10
PAINLEVEL_OUTOF10: 10
PAINLEVEL_OUTOF10: 0
PAINLEVEL_OUTOF10: 10
PAINLEVEL_OUTOF10: 10
PAINLEVEL_OUTOF10: 9
PAINLEVEL_OUTOF10: 7

## 2025-03-19 ASSESSMENT — PAIN - FUNCTIONAL ASSESSMENT
PAIN_FUNCTIONAL_ASSESSMENT: PREVENTS OR INTERFERES SOME ACTIVE ACTIVITIES AND ADLS
PAIN_FUNCTIONAL_ASSESSMENT: ACTIVITIES ARE NOT PREVENTED
PAIN_FUNCTIONAL_ASSESSMENT: PREVENTS OR INTERFERES SOME ACTIVE ACTIVITIES AND ADLS
PAIN_FUNCTIONAL_ASSESSMENT: 0-10
PAIN_FUNCTIONAL_ASSESSMENT: 0-10
PAIN_FUNCTIONAL_ASSESSMENT: ACTIVITIES ARE NOT PREVENTED

## 2025-03-19 ASSESSMENT — PAIN DESCRIPTION - LOCATION
LOCATION: ABDOMEN;BACK
LOCATION: ABDOMEN
LOCATION: ABDOMEN
LOCATION: ABDOMEN;BACK

## 2025-03-19 ASSESSMENT — PAIN DESCRIPTION - FREQUENCY
FREQUENCY: CONTINUOUS
FREQUENCY: CONTINUOUS

## 2025-03-19 ASSESSMENT — PAIN DESCRIPTION - PAIN TYPE
TYPE: SURGICAL PAIN
TYPE: SURGICAL PAIN

## 2025-03-19 ASSESSMENT — PAIN DESCRIPTION - DESCRIPTORS
DESCRIPTORS: SHARP;ACHING
DESCRIPTORS: ACHING;SORE
DESCRIPTORS: DISCOMFORT
DESCRIPTORS: ACHING;DISCOMFORT
DESCRIPTORS: ACHING;SORE

## 2025-03-19 ASSESSMENT — ENCOUNTER SYMPTOMS: SHORTNESS OF BREATH: 0

## 2025-03-19 ASSESSMENT — PAIN DESCRIPTION - ORIENTATION
ORIENTATION: MID;LOWER
ORIENTATION: RIGHT;LEFT;MID;OUTER;LOWER
ORIENTATION: RIGHT;LEFT;LOWER;UPPER

## 2025-03-19 ASSESSMENT — PAIN SCALES - WONG BAKER
WONGBAKER_NUMERICALRESPONSE: NO HURT
WONGBAKER_NUMERICALRESPONSE: NO HURT

## 2025-03-19 ASSESSMENT — PAIN DESCRIPTION - ONSET
ONSET: ON-GOING
ONSET: ON-GOING

## 2025-03-19 NOTE — ANESTHESIA POSTPROCEDURE EVALUATION
Department of Anesthesiology  Postprocedure Note    Patient: Yevgeniy Johnson  MRN: [id number]  YOB: 1960  Date of evaluation: 3/19/2025    Procedure Summary       Date: 03/19/25 Room / Location: [address]    Anesthesia Start: 1257 Anesthesia Stop: 1521    Procedures:       EXPLORATORY LAPAROTOMY/RIGHT COLECTOMY/ALTAMIRANO'S/DRAINAGE OF PELVIC ABCESS (Abdomen)      CYSTOSCOPY BILATERAL URETERAL CATHETER INSERTION Diagnosis:       Colovesical fistula      (Colovesical fistula [N32.1])    Surgeons: Francis Barrett MD; Edwin Mcgrath MD Responsible Provider: Edwin Balderas DO    Anesthesia Type: General ASA Status: 3            Anesthesia Type: General    Nona Phase I: Nona Score: 9    Nona Phase II:      Anesthesia Post Evaluation    Patient location during evaluation: PACU  Patient participation: complete - patient participated  Level of consciousness: awake  Cardiovascular status: blood pressure returned to baseline  Respiratory status: acceptable  Hydration status: euvolemic  Multimodal analgesia pain management approach  Pain management: adequate        No notable events documented.

## 2025-03-19 NOTE — PLAN OF CARE
Problem: Pain  Goal: Verbalizes/displays adequate comfort level or baseline comfort level  3/19/2025 1230 by Christy Stone RN  Outcome: Progressing  3/19/2025 0018 by Mecca Pascual RN  Outcome: Progressing     Problem: ABCDS Injury Assessment  Goal: Absence of physical injury  3/19/2025 1230 by Christy Stone RN  Outcome: Progressing  3/19/2025 0018 by Mecca Pascual, RN  Outcome: Progressing     Problem: Safety - Adult  Goal: Free from fall injury  3/19/2025 1230 by Christy Stone RN  Outcome: Progressing  3/19/2025 0018 by Mecca Pascual, RN  Outcome: Progressing

## 2025-03-19 NOTE — OP NOTE
Operative Note      Patient: Yevgeniy Johnson  YOB: 1960  MRN: [id number]    Date of Procedure: 3/19/2025    Pre-Op Diagnosis Codes:      * Colovesical fistula [N32.1]    Post-Op Diagnosis: same       Procedure(s):  CYSTOSCOPY BILATERAL URETERAL CATHETER INSERTION    Surgeon(s):  Edwin Mcgrath MD    Assistant:   * No surgical staff found *    Anesthesia: General    Estimated Blood Loss (mL): Minimal    Complications: None    Specimens:   * No specimens in log *    Implants:  * No implants in log *      Drains:   Urinary Catheter 03/16/25 2 Way (Active)   Catheter Indications Perioperative use for selected surgical procedures 03/19/25 0800   Site Assessment No urethral drainage 03/19/25 0800   Urine Color Jenniffer 03/19/25 1155   Urine Appearance Cloudy 03/19/25 1155   Urine Odor Malodorous 03/19/25 1048   Collection Container Standard 03/19/25 1155   Securement Method Leg strap 03/19/25 1048   Catheter Care  Soap and water 03/19/25 1048   Catheter Best Practices  Drainage tube clipped to bed;Catheter secured to thigh;Tamper seal intact;Bag below bladder;Bag not on floor;Lack of dependent loop in tubing;Drainage bag less than half full 03/19/25 1048   Status Draining 03/19/25 1048   Manual Irrigation Volume Input (mL) 200 mL 03/18/25 2049   Output (mL) 500 mL 03/19/25 1048       Findings:  See below    Detailed Description of Procedure:   Indications:  64-year-old male presenting with colovesical fistula.  Urology has been requested to place bilateral ureteral catheters for the case.  I discussed risks of procedure with patient including infection, bleeding, injury to the bladder or ureters requiring prolonged Jorge catheterization or stent placement, urinary retention, risks of anesthesia which he understands and agrees to proceed.     Procedure in detail:  After the risks, benefits, indications, alternatives, and expectations of the procedure were reviewed with the patient and informed consent was

## 2025-03-19 NOTE — PLAN OF CARE
Problem: Discharge Planning  Goal: Discharge to home or other facility with appropriate resources  Outcome: Progressing     Problem: Pain  Goal: Verbalizes/displays adequate comfort level or baseline comfort level  Outcome: Progressing     Problem: ABCDS Injury Assessment  Goal: Absence of physical injury  3/19/2025 0018 by Mecca Pascual, RN  Outcome: Progressing  3/18/2025 1135 by Meño Forman RN  Outcome: Progressing     Problem: Safety - Adult  Goal: Free from fall injury  3/19/2025 0018 by Mecca Pascual, RN  Outcome: Progressing  3/18/2025 1135 by Meño Forman, RN  Outcome: Progressing

## 2025-03-19 NOTE — PROGRESS NOTES
Internal Medicine Progress Note    Patient's name: Yevgeniy Johnson  : 1960  Chief complaints (on day of admission): Constipation (X3 days), Shortness of Breath, Nasal Congestion, and Abdominal Pain (Mid lower abd pain )  Admission date: 3/15/2025  Date of service: 3/19/2025   Room: [address] MED SURG/TELE  Primary care physician: Tanvir Levy MD  Reason for visit: Follow-up for colovesical fistula/diverticular abscess    Subjective  Yevgeniy is seen lying in bed asleep, in no distress.  He does easily awaken to voice.  He reports being tired, does feel somewhat nauseated and is asking for Zofran.  He continues to have some mild discomfort in his abdomen but was able to move his bowels yesterday so feels a little better.  He denies any fever or chills.  Denies any shortness of breath or difficulty breathing.  He is currently n.p.o. with plans for colon resection later today.  No other issues or concerns from nursing.    Review of Systems  Full 10 point review of systems negative unless mentioned above.    Hospital Medications  Current Facility-Administered Medications   Medication Dose Route Frequency Provider Last Rate Last Admin    buprenorphine-naloxone (SUBOXONE) 8-2 MG SL tablet 2 tablet  2 tablet SubLINGual Daily Sofia Stone APRN - CNP   2 tablet at 25 0824    buprenorphine-naloxone (SUBOXONE) 8-2 MG SL tablet 1 tablet  1 tablet SubLINGual QHS Sofia Stone APRN - CNP   1 tablet at 25 2049    hydrOXYzine pamoate (VISTARIL) capsule 25 mg  25 mg Oral Q6H PRN Sofia Stone APRN - CNP   25 mg at 25 1615    polyethylene glycol (GLYCOLAX) packet 17 g  17 g Oral Daily PRN Tyrell Palm DO   17 g at 25 1341    morphine (PF) injection 2 mg  2 mg IntraVENous Q3H PRN Liv Gordon DO   2 mg at 25 1611    docusate sodium (COLACE) capsule 100 mg  100 mg Oral BID Garett Varghese MD   100 mg at 25    bisacodyl (DULCOLAX) suppository 10 mg  10 mg Rectal

## 2025-03-19 NOTE — H&P
Yevgeniy Johnson is a 64 y.o. male who is planned for fistula repair and possible colon resection today with general surgery.  Urology has been asked to place bilateral ureteral catheters for assistance with ureteral identification during the case.  I discussed risks of procedure with patient including infection, bleeding, injury to the bladder or ureters requiring prolonged Jorge catheterization or stent placement, urinary retention, risks of anesthesia which he understands and agrees to proceed.    Past Medical History:   Diagnosis Date    COPD (chronic obstructive pulmonary disease) (HCC)     Hypertension        Past Surgical History:   Procedure Laterality Date    KNEE SURGERY Right     LUMBAR LAMINECTOMY         History reviewed. No pertinent family history.    Prior to Admission medications    Medication Sig Start Date End Date Taking? Authorizing Provider   buprenorphine-naloxone (SUBOXONE) 8-2 MG SUBL SL tablet Place 2 tablets under the tongue daily. Max Daily Amount: 2 tablets   Yes Hany Yates MD   buprenorphine-naloxone (SUBOXONE) 8-2 MG SUBL SL tablet Place 1 tablet under the tongue nightly. Max Daily Amount: 1 tablet   Yes Hany Yates MD   tamsulosin (FLOMAX) 0.4 MG capsule Take 1 capsule by mouth daily 12/6/22  Yes Jenniffer Méndez, APRN - NP   ANORO ELLIPTA 62.5-25 MCG/INH AEPB inhaler inhale 1 puff by mouth and INTO THE LUNGS once daily 9/18/22  Yes Hany Yates MD   albuterol (PROVENTIL) (2.5 MG/3ML) 0.083% nebulizer solution inhale contents of 1 vial ( 3 milliliters ) in nebulizer by mouth...  (REFER TO PRESCRIPTION NOTES). 8/1/22  Yes Hany Yates MD   ondansetron (ZOFRAN ODT) 4 MG disintegrating tablet Take 1 tablet by mouth 3 times daily as needed for Nausea or Vomiting 8/8/22  Yes Dennis Alfred PA   naproxen (NAPROSYN) 500 MG tablet Take 1 tablet by mouth 2 times daily as needed for Pain 9/25/24 9/30/24  Crissy Betancourt APRN - CNP

## 2025-03-19 NOTE — ANESTHESIA PRE PROCEDURE
03/19/25 0903    sodium chloride flush 0.9 % injection 10 mL  10 mL IntraVENous 2 times per day LacivitaMary Ann APRN - CNP   10 mL at 03/19/25 0903    sodium chloride flush 0.9 % injection 10 mL  10 mL IntraVENous PRN Lacivmorro, Mary Ann, APRN - CNP   10 mL at 03/16/25 1758    0.9 % sodium chloride infusion   IntraVENous PRN LacivMary Ann hooker APRN - CNP        potassium chloride (KLOR-CON M) extended release tablet 40 mEq  40 mEq Oral PRN Lacivita, Mary Ann, APRN - CNP        Or    potassium bicarb-citric acid (EFFER-K) effervescent tablet 40 mEq  40 mEq Oral PRN Lacivita, PORTILLO ReeseN - CNP        Or    potassium chloride 10 mEq/100 mL IVPB (Peripheral Line)  10 mEq IntraVENous PRN Lacivita, Mary Ann, APRN - CNP        magnesium sulfate 2000 mg in 50 mL IVPB premix  2,000 mg IntraVENous PRN Mary Ann Stanford APRN - CNP        ondansetron (ZOFRAN-ODT) disintegrating tablet 4 mg  4 mg Oral Q8H PRN Mary Ann Stanford APRN - CNP   4 mg at 03/17/25 1347    Or    ondansetron (ZOFRAN) injection 4 mg  4 mg IntraVENous Q6H PRN Mary Ann Stanford APRN - CNP   4 mg at 03/19/25 0544    senna (SENOKOT) tablet 8.6 mg  1 tablet Oral Daily PRN Mary Ann Stanford APRN - CNP   8.6 mg at 03/16/25 1530    acetaminophen (TYLENOL) tablet 650 mg  650 mg Oral Q6H PRN Mary Ann Stanford APRN - CNP        Or    acetaminophen (TYLENOL) suppository 650 mg  650 mg Rectal Q6H PRN Mary Ann Stanford, APRN - CNP        melatonin tablet 3 mg  3 mg Oral Nightly PRN Mary Ann Stanford, APRN - CNP   3 mg at 03/18/25 0039    arformoterol tartrate (BROVANA) nebulizer solution 15 mcg  15 mcg Nebulization BID RT Garett Varghese MD   15 mcg at 03/19/25 0844    And    ipratropium (ATROVENT) 0.02 % nebulizer solution 0.5 mg  0.5 mg Nebulization BID Garett Varghese MD   0.5 mg at 03/19/25 0844    fluticasone (FLONASE) 50 MCG/ACT nasal spray 1 spray  1 spray Each Nostril BID PRN Tyrell Palm DO   1 spray at 03/16/25 1003       Allergies:    Allergies   Allergen Reactions

## 2025-03-19 NOTE — OP NOTE
Operative Note      Patient: Yevgeniy Johnson  YOB: 1960  MRN: [id number]    Date of Procedure: 3/19/2025    Pre-Op Diagnosis Codes:      * Colovesical fistula [N32.1]    Post-Op Diagnosis: Same and Chronic Diverticulitis, Colovesicular Fistula, Small bowel fistula       Procedure(s):  EXPLORATORY LAPAROTOMY  COMPLETE MOBILIZATION OF SPLENIC FLEXURE  TAKEDOWN OF COLOVESICULAR FISTULA  TAKEDOWN OF  ENTEROCOLONIC FISTULA  RIGHT HEMICOLECTOMY WITH PRIMARY STAPLED ANASTOMOSIS  SIGMOIDECTOMY WITH END COLOSTOMY (INSREEN'S)  DRAINAGE OF SUBCUTANEOUS, FASCIA, AND PELVIC ABCESS  CYSTOSCOPY WITH BILATERAL URETERAL CATHETER INSERTION    Surgeon(s):  Francis Barrett MD Graff, John M, MD    Assistant:   Resident: César Noel MD    Anesthesia: General    Estimated Blood Loss (mL): less than 100     Complications: None    Specimens:   ID Type Source Tests Collected by Time Destination   A : RIGHT COLON Tissue Tissue SURGICAL PATHOLOGY Francis Barrett MD 3/19/2025 1412    B : SIGMOID COLON Tissue Tissue SURGICAL PATHOLOGY Francis Barrett MD 3/19/2025 1428        Implants:  * No implants in log *      Drains:   Closed/Suction Drain Right RLQ Bulb (Active)       Urinary Catheter 03/19/25 2 Way (Active)       [REMOVED] Ureteral Drain/Stent 03/19/25 Left Ureter (Removed)       [REMOVED] Ureteral Drain/Stent 03/19/25 Right Ureter (Removed)       [REMOVED] Urinary Catheter 03/16/25 2 Way (Removed)   Catheter Indications Perioperative use for selected surgical procedures 03/19/25 0800   Site Assessment No urethral drainage 03/19/25 0800   Urine Color Jenniffer 03/19/25 1155   Urine Appearance Cloudy 03/19/25 1155   Urine Odor Malodorous 03/19/25 1048   Collection Container Standard 03/19/25 1155   Securement Method Leg strap 03/19/25 1048   Catheter Care  Soap and water 03/19/25 1048   Catheter Best Practices  Drainage tube clipped to bed;Catheter secured to thigh;Tamper seal intact;Bag below bladder;Bag not on

## 2025-03-20 LAB
ALBUMIN SERPL-MCNC: 3.3 G/DL (ref 3.5–5.2)
ALP SERPL-CCNC: 76 U/L (ref 40–129)
ALT SERPL-CCNC: 17 U/L (ref 0–40)
ANION GAP SERPL CALCULATED.3IONS-SCNC: 14 MMOL/L (ref 7–16)
AST SERPL-CCNC: 15 U/L (ref 0–39)
BASOPHILS # BLD: 0.03 K/UL (ref 0–0.2)
BASOPHILS NFR BLD: 0 % (ref 0–2)
BILIRUB SERPL-MCNC: 0.4 MG/DL (ref 0–1.2)
BUN SERPL-MCNC: 8 MG/DL (ref 6–23)
CALCIUM SERPL-MCNC: 8.7 MG/DL (ref 8.6–10.2)
CHLORIDE SERPL-SCNC: 99 MMOL/L (ref 98–107)
CO2 SERPL-SCNC: 24 MMOL/L (ref 22–29)
CREAT SERPL-MCNC: 0.7 MG/DL (ref 0.7–1.2)
EOSINOPHIL # BLD: 0 K/UL (ref 0.05–0.5)
EOSINOPHILS RELATIVE PERCENT: 0 % (ref 0–6)
ERYTHROCYTE [DISTWIDTH] IN BLOOD BY AUTOMATED COUNT: 12.7 % (ref 11.5–15)
GFR, ESTIMATED: >90 ML/MIN/1.73M2
GLUCOSE SERPL-MCNC: 161 MG/DL (ref 74–99)
HCT VFR BLD AUTO: 37.8 % (ref 37–54)
HGB BLD-MCNC: 12.1 G/DL (ref 12.5–16.5)
IMM GRANULOCYTES # BLD AUTO: 0.09 K/UL (ref 0–0.58)
IMM GRANULOCYTES NFR BLD: 1 % (ref 0–5)
LYMPHOCYTES NFR BLD: 0.82 K/UL (ref 1.5–4)
LYMPHOCYTES RELATIVE PERCENT: 4 % (ref 20–42)
MCH RBC QN AUTO: 28.9 PG (ref 26–35)
MCHC RBC AUTO-ENTMCNC: 32 G/DL (ref 32–34.5)
MCV RBC AUTO: 90.4 FL (ref 80–99.9)
MONOCYTES NFR BLD: 1.55 K/UL (ref 0.1–0.95)
MONOCYTES NFR BLD: 8 % (ref 2–12)
NEUTROPHILS NFR BLD: 87 % (ref 43–80)
NEUTS SEG NFR BLD: 16.1 K/UL (ref 1.8–7.3)
PLATELET # BLD AUTO: 411 K/UL (ref 130–450)
PMV BLD AUTO: 9 FL (ref 7–12)
POTASSIUM SERPL-SCNC: 3.6 MMOL/L (ref 3.5–5)
PROT SERPL-MCNC: 6.3 G/DL (ref 6.4–8.3)
RBC # BLD AUTO: 4.18 M/UL (ref 3.8–5.8)
RBC # BLD: NORMAL 10*6/UL
SODIUM SERPL-SCNC: 137 MMOL/L (ref 132–146)
WBC OTHER # BLD: 18.6 K/UL (ref 4.5–11.5)

## 2025-03-20 PROCEDURE — 99232 SBSQ HOSP IP/OBS MODERATE 35: CPT | Performed by: INTERNAL MEDICINE

## 2025-03-20 PROCEDURE — 6370000000 HC RX 637 (ALT 250 FOR IP): Performed by: STUDENT IN AN ORGANIZED HEALTH CARE EDUCATION/TRAINING PROGRAM

## 2025-03-20 PROCEDURE — 97535 SELF CARE MNGMENT TRAINING: CPT

## 2025-03-20 PROCEDURE — 1200000000 HC SEMI PRIVATE

## 2025-03-20 PROCEDURE — 2500000003 HC RX 250 WO HCPCS: Performed by: STUDENT IN AN ORGANIZED HEALTH CARE EDUCATION/TRAINING PROGRAM

## 2025-03-20 PROCEDURE — 36415 COLL VENOUS BLD VENIPUNCTURE: CPT

## 2025-03-20 PROCEDURE — 6360000002 HC RX W HCPCS: Performed by: STUDENT IN AN ORGANIZED HEALTH CARE EDUCATION/TRAINING PROGRAM

## 2025-03-20 PROCEDURE — 80053 COMPREHEN METABOLIC PANEL: CPT

## 2025-03-20 PROCEDURE — 2580000003 HC RX 258: Performed by: STUDENT IN AN ORGANIZED HEALTH CARE EDUCATION/TRAINING PROGRAM

## 2025-03-20 PROCEDURE — 2700000000 HC OXYGEN THERAPY PER DAY

## 2025-03-20 PROCEDURE — 94640 AIRWAY INHALATION TREATMENT: CPT

## 2025-03-20 PROCEDURE — 85025 COMPLETE CBC W/AUTO DIFF WBC: CPT

## 2025-03-20 PROCEDURE — 97165 OT EVAL LOW COMPLEX 30 MIN: CPT

## 2025-03-20 PROCEDURE — 6370000000 HC RX 637 (ALT 250 FOR IP): Performed by: INTERNAL MEDICINE

## 2025-03-20 RX ORDER — NALOXONE HYDROCHLORIDE 0.4 MG/ML
INJECTION, SOLUTION INTRAMUSCULAR; INTRAVENOUS; SUBCUTANEOUS PRN
Status: DISCONTINUED | OUTPATIENT
Start: 2025-03-20 | End: 2025-03-24

## 2025-03-20 RX ORDER — CALCIUM CARBONATE 500 MG/1
500 TABLET, CHEWABLE ORAL 4 TIMES DAILY
Status: DISCONTINUED | OUTPATIENT
Start: 2025-03-20 | End: 2025-03-20

## 2025-03-20 RX ORDER — CALCIUM CARBONATE 500 MG/1
500 TABLET, CHEWABLE ORAL 4 TIMES DAILY PRN
Status: DISCONTINUED | OUTPATIENT
Start: 2025-03-20 | End: 2025-03-28 | Stop reason: HOSPADM

## 2025-03-20 RX ORDER — POLYETHYLENE GLYCOL 3350 17 G/17G
17 POWDER, FOR SOLUTION ORAL DAILY PRN
Status: DISCONTINUED | OUTPATIENT
Start: 2025-03-20 | End: 2025-03-24

## 2025-03-20 RX ORDER — CALCIUM CARBONATE 500 MG/1
500 TABLET, CHEWABLE ORAL 3 TIMES DAILY PRN
Status: DISCONTINUED | OUTPATIENT
Start: 2025-03-20 | End: 2025-03-20

## 2025-03-20 RX ORDER — ENOXAPARIN SODIUM 100 MG/ML
40 INJECTION SUBCUTANEOUS DAILY
Status: DISCONTINUED | OUTPATIENT
Start: 2025-03-20 | End: 2025-03-22 | Stop reason: DRUGHIGH

## 2025-03-20 RX ADMIN — PIPERACILLIN AND TAZOBACTAM 3375 MG: 3; .375 INJECTION, POWDER, LYOPHILIZED, FOR SOLUTION INTRAVENOUS at 02:56

## 2025-03-20 RX ADMIN — IPRATROPIUM BROMIDE 0.5 MG: 0.5 SOLUTION RESPIRATORY (INHALATION) at 19:51

## 2025-03-20 RX ADMIN — ONDANSETRON 4 MG: 2 INJECTION INTRAMUSCULAR; INTRAVENOUS at 09:14

## 2025-03-20 RX ADMIN — CALCIUM CARBONATE 500 MG: 500 TABLET, CHEWABLE ORAL at 20:36

## 2025-03-20 RX ADMIN — SODIUM CHLORIDE, PRESERVATIVE FREE 10 ML: 5 INJECTION INTRAVENOUS at 20:36

## 2025-03-20 RX ADMIN — Medication 3 MG: at 03:18

## 2025-03-20 RX ADMIN — OXYCODONE HYDROCHLORIDE 10 MG: 5 TABLET ORAL at 02:56

## 2025-03-20 RX ADMIN — ARFORMOTEROL TARTRATE 15 MCG: 15 SOLUTION RESPIRATORY (INHALATION) at 19:51

## 2025-03-20 RX ADMIN — PIPERACILLIN AND TAZOBACTAM 3375 MG: 3; .375 INJECTION, POWDER, LYOPHILIZED, FOR SOLUTION INTRAVENOUS at 12:24

## 2025-03-20 RX ADMIN — ONDANSETRON 4 MG: 2 INJECTION INTRAMUSCULAR; INTRAVENOUS at 22:25

## 2025-03-20 RX ADMIN — TAMSULOSIN HYDROCHLORIDE 0.4 MG: 0.4 CAPSULE ORAL at 09:05

## 2025-03-20 RX ADMIN — ENOXAPARIN SODIUM 40 MG: 100 INJECTION SUBCUTANEOUS at 13:59

## 2025-03-20 RX ADMIN — SODIUM CHLORIDE, PRESERVATIVE FREE 10 ML: 5 INJECTION INTRAVENOUS at 12:24

## 2025-03-20 RX ADMIN — METHOCARBAMOL TABLETS 1000 MG: 500 TABLET, COATED ORAL at 18:05

## 2025-03-20 RX ADMIN — SODIUM CHLORIDE, PRESERVATIVE FREE 10 ML: 5 INJECTION INTRAVENOUS at 09:15

## 2025-03-20 RX ADMIN — BUPRENORPHINE HYDROCHLORIDE AND NALOXONE HYDROCHLORIDE DIHYDRATE 2 TABLET: 8; 2 TABLET SUBLINGUAL at 09:05

## 2025-03-20 RX ADMIN — ACETAMINOPHEN 650 MG: 325 TABLET ORAL at 18:05

## 2025-03-20 RX ADMIN — BUPRENORPHINE HYDROCHLORIDE AND NALOXONE HYDROCHLORIDE DIHYDRATE 1 TABLET: 8; 2 TABLET SUBLINGUAL at 20:35

## 2025-03-20 RX ADMIN — SODIUM CHLORIDE, PRESERVATIVE FREE 10 ML: 5 INJECTION INTRAVENOUS at 02:56

## 2025-03-20 RX ADMIN — CALCIUM CARBONATE 500 MG: 500 TABLET, CHEWABLE ORAL at 13:59

## 2025-03-20 RX ADMIN — HYDROMORPHONE HYDROCHLORIDE 0.5 MG: 1 INJECTION, SOLUTION INTRAMUSCULAR; INTRAVENOUS; SUBCUTANEOUS at 05:10

## 2025-03-20 RX ADMIN — PIPERACILLIN AND TAZOBACTAM 3375 MG: 3; .375 INJECTION, POWDER, LYOPHILIZED, FOR SOLUTION INTRAVENOUS at 20:36

## 2025-03-20 RX ADMIN — METHOCARBAMOL TABLETS 1000 MG: 500 TABLET, COATED ORAL at 09:05

## 2025-03-20 RX ADMIN — METHOCARBAMOL TABLETS 1000 MG: 500 TABLET, COATED ORAL at 12:24

## 2025-03-20 RX ADMIN — Medication: at 08:51

## 2025-03-20 RX ADMIN — METHOCARBAMOL TABLETS 1000 MG: 500 TABLET, COATED ORAL at 20:35

## 2025-03-20 RX ADMIN — MIRTAZAPINE 30 MG: 15 TABLET, FILM COATED ORAL at 20:35

## 2025-03-20 RX ADMIN — HYDROXYZINE PAMOATE 25 MG: 25 CAPSULE ORAL at 03:18

## 2025-03-20 ASSESSMENT — PAIN DESCRIPTION - ORIENTATION
ORIENTATION: RIGHT;LEFT;UPPER;LOWER
ORIENTATION: RIGHT;LEFT;LOWER;UPPER
ORIENTATION: RIGHT;LEFT
ORIENTATION: RIGHT;LEFT;MID

## 2025-03-20 ASSESSMENT — PAIN DESCRIPTION - LOCATION
LOCATION: ABDOMEN;BACK
LOCATION: ABDOMEN;BACK
LOCATION: ABDOMEN
LOCATION: ABDOMEN;BACK
LOCATION: ABDOMEN;BACK
LOCATION: ABDOMEN
LOCATION: ABDOMEN
LOCATION: ABDOMEN;BACK

## 2025-03-20 ASSESSMENT — PAIN SCALES - GENERAL
PAINLEVEL_OUTOF10: 8
PAINLEVEL_OUTOF10: 7
PAINLEVEL_OUTOF10: 8
PAINLEVEL_OUTOF10: 8
PAINLEVEL_OUTOF10: 0
PAINLEVEL_OUTOF10: 7
PAINLEVEL_OUTOF10: 0
PAINLEVEL_OUTOF10: 8
PAINLEVEL_OUTOF10: 9
PAINLEVEL_OUTOF10: 10

## 2025-03-20 ASSESSMENT — PAIN - FUNCTIONAL ASSESSMENT
PAIN_FUNCTIONAL_ASSESSMENT: ACTIVITIES ARE NOT PREVENTED
PAIN_FUNCTIONAL_ASSESSMENT: ACTIVITIES ARE NOT PREVENTED

## 2025-03-20 ASSESSMENT — PAIN DESCRIPTION - DESCRIPTORS
DESCRIPTORS: ACHING;DISCOMFORT
DESCRIPTORS: ACHING;DISCOMFORT
DESCRIPTORS: ACHING
DESCRIPTORS: ACHING;CRAMPING;SORE
DESCRIPTORS: ACHING
DESCRIPTORS: ACHING;SORE
DESCRIPTORS: ACHING;DISCOMFORT

## 2025-03-20 ASSESSMENT — PAIN SCALES - WONG BAKER
WONGBAKER_NUMERICALRESPONSE: NO HURT
WONGBAKER_NUMERICALRESPONSE: NO HURT

## 2025-03-20 NOTE — PROGRESS NOTES
Physical Therapy  PT eval attempted, but pt requested to hold off due to having heartburn. Will re-attempt at later date.

## 2025-03-20 NOTE — PROGRESS NOTES
General Surgery   Daily Progress Note      Patient's Name/Date of Birth: Yevgeniy Johnson / 1960    Date: March 20, 2025       Subjective: Complaining of some pain, but otherwsie no issues. No nausea or vomiting. Has some bowel sweat in ostomy bag. Tolerated CLD.     Objective:  BP (!) 142/86   Pulse 77   Temp 97.7 °F (36.5 °C) (Oral)   Resp 16   Ht 1.778 m (5' 10\")   Wt 93.8 kg (206 lb 12.7 oz)   SpO2 95%   BMI 29.67 kg/m²   Labs:  Recent Labs     03/17/25  0545 03/18/25  0709 03/19/25  0522   WBC 7.8 6.4 7.3   HGB 13.1 12.5 13.1   HCT 40.9 38.7 40.9     Recent Labs     03/17/25  0545 03/18/25  0709 03/19/25  0522    139 142   K 4.0 4.1 4.2    104 106   CO2 26 25 27   BUN 8 6 6   CREATININE 0.8 0.8 0.8     Recent Labs     03/17/25  0545 03/18/25  0709 03/19/25  0522   ALKPHOS 77 72 75   ALT 14 12 12   AST 14 10 10   BILITOT 0.3 0.4 0.3         General appearance: NAD  Head: NCAT, PERRL, EOMI, pink conjunctiva  Neck: supple, no masses  Lungs: symmetric chest rise, no audible wheezes, on room air  Heart: warm throughout  Abdomen: soft, moderately distended, appropriately tender, ostomy pink and viable, bowel sweat in ostomy bag  Skin: no visible lesions  Extremities: extremities normal, atraumatic, no cyanosis or edema      Assessment/Plan:  Yevgeniy Johnson is a 64 y.o. male w colovesical fistula s/p exlap with takedown colovesicular & SB fistula, R ricky, Nona's, drainage of intra-abd abscess 3/19    Maintain raphael 2wks  Continue CLD  Prn and scheduled analgesia  Encourage OOB, ambulation    Gale Ramirez DO  General Surgery Resident

## 2025-03-20 NOTE — PROGRESS NOTES
INPATIENT CARDIOLOGY FOLLOW-UP    Name: Yevgeniy Johnson    Age: 64 y.o.    Date of Admission: 3/15/2025  1:52 PM    Date of Service: 3/20/2025    Chief Complaint: Follow-up for Preop cardiac evaluation prior to robotic assisted colectomy and colostomy.     Interim History:  No new overnight cardiac complaints.  Patient underwent exploratory laparotomy and takedown of colovesicular fistula and takedown of enterocolonic fistula, right hemicolectomy, sigmoidectomy with end colostomy and drainage of pelvic abscess cystoscopy with bilateral ureteral catheter insertions.  No perioperative cardiac events noted.  Patient is feeling better denies any chest pain or dyspnea still has some abdominal discomfort.  Started clear liquid diet this morning.  Currently with no complaints of CP, SOB, palpitations, dizziness, or lightheadedness. SR on telemetry.    Review of Systems:   Cardiac: As per HPI  General: No fever, chills  Pulmonary: As per HPI  HEENT: No visual disturbances, difficult swallowing  GI: No nausea, vomiting  Endocrine: No thyroid disease or DM  Musculoskeletal: POWER x 4, no focal motor deficits  Skin: Intact, no rashes  Neuro/Psych: No headache or seizures    Problem List:  Patient Active Problem List   Diagnosis    Pelvic abscess in male (HCC)    Hypertension    COPD (chronic obstructive pulmonary disease) ([facility])       Allergies:  Allergies   Allergen Reactions    Citalopram     Gabapentin     Ketorolac Tromethamine        Current Medications:  Current Facility-Administered Medications   Medication Dose Route Frequency Provider Last Rate Last Admin    naloxone 0.4 mg in 10 mL sodium chloride syringe   IntraVENous PRN César Noel MD        polyethylene glycol (GLYCOLAX) packet 17 g  17 g Oral Daily PRN César Noel MD        HYDROmorphone (DILAUDID) 1 mg/mL PCA   IntraVENous Continuous César Noel MD   New Bag at 03/20/25 0851    0.9 % sodium chloride infusion   IntraVENous PRN Bert  Results   Component Value Date    VLDL 17 03/18/2025     No results found for: \"CHOLHDLRATIO\"    Cardiac Tests:  Telemetry findings reviewed: SR at rate 80, had a multiple episodes of nonsustained VT.  No new tachy/bradyarrhythmias overnight     EKG: Normal sinus rhythm, normal EKG     Labs and vitals were reviewed: As above    Labs reviewed as above BMP normal CBC WBC 18.6 hemoglobin 12.1        TTE 2017 EF 40%        TTE- 3/18/25:    Left Ventricle: Normal left ventricular systolic function with a visually estimated EF of 55 - 60%. Left ventricle size is normal. Normal wall thickness. Ventricular mass is normal. Findings consistent with concentric remodeling. Normal wall motion. Indeterminate diastolic function.    Right Ventricle: Right ventricle is mildly dilated. Normal systolic function. TAPSE is 2.4 cm.    Aortic Valve: No stenosis.    Tricuspid Valve: Normal RVSP. The estimated RVSP is 24 mmHg.    Left Atrium: Left atrium is mildly dilated.    Pericardium: No pericardial effusion.    Image quality is adequate.     Lexiscan nuclear stress test test 3/19/2025:    Stress Combined Conclusion: The study is negative for myocardial ischemia. Overall findings suggest a low risk of cardiac events.    Stress Function: Left ventricular function post-stress is normal. Post-stress ejection fraction calculated at 70%.    Perfusion Comments: There is no evidence of inducible ischemia.    Perfusion Defect: There is a severe left ventricular stress perfusion defect that is small to medium in size present in the inferior segment that is fixed. Suggests attnuation artifact vs prior infarct.    ECG: Resting ECG demonstrates normal sinus rhythm.    ECG: The stress ECG was negative for ischemia.    Stress Test: A pharmacological stress test was performed using regadenoson (Lexiscan). PO caffeine given as a reversal agent. The patient reported dyspnea and no chest pain during the stress test.    No prior study.       Stress Test:

## 2025-03-20 NOTE — PLAN OF CARE
Problem: Pain  Goal: Verbalizes/displays adequate comfort level or baseline comfort level  Outcome: Progressing  Flowsheets (Taken 3/19/2025 2307 by Pepper Hastings)  Verbalizes/displays adequate comfort level or baseline comfort level: Encourage patient to monitor pain and request assistance     Problem: ABCDS Injury Assessment  Goal: Absence of physical injury  Outcome: Progressing     Problem: Safety - Adult  Goal: Free from fall injury  Outcome: Progressing

## 2025-03-20 NOTE — PROGRESS NOTES
Internal Medicine Progress Note    Patient's name: Yevgeniy Johnson  : 1960  Chief complaints (on day of admission): Constipation (X3 days), Shortness of Breath, Nasal Congestion, and Abdominal Pain (Mid lower abd pain )  Admission date: 3/15/2025  Date of service: 3/20/2025   Room: [address] MED SURG/TELE  Primary care physician: Tanvir Levy MD  Reason for visit: Follow-up for colovesical fistula/diverticular abscess    Subjective  Yevgeniy is seen lying in bed asleep, in no distress.  He does easily awaken to voice.  He reports ongoing pain in his abdomen from the surgery, does feel like the pain medications help.  He denies any nausea or vomiting but does have some indigestion and has been \"burping a lot\".  He is asking if he can get out of bed and walk around.  He is also asking how long the catheter needs to remain in place as it is somewhat uncomfortable.  No other issues or concerns from nursing.    Review of Systems  Full 10 point review of systems negative unless mentioned above.    Hospital Medications  Current Facility-Administered Medications   Medication Dose Route Frequency Provider Last Rate Last Admin    naloxone 0.4 mg in 10 mL sodium chloride syringe   IntraVENous PRN César Noel MD        polyethylene glycol (GLYCOLAX) packet 17 g  17 g Oral Daily PRN César Noel MD        HYDROmorphone (DILAUDID) 1 mg/mL PCA   IntraVENous Continuous César Noel MD        0.9 % sodium chloride infusion   IntraVENous PRN César Noel MD        piperacillin-tazobactam (ZOSYN) injection 3,375 mg  3,375 mg IntraVENous Q8H César Noel MD   3,375 mg at 25 0256    And    sodium chloride (PF) 0.9 % injection 10 mL  10 mL IntraVENous Q6H César Noel MD   10 mL at 25 0256    acetaminophen (TYLENOL) tablet 650 mg  650 mg Oral 4 times per day César Noel MD        methocarbamol (ROBAXIN) tablet 1,000 mg  1,000 mg Oral 4x Daily César Noel MD   1,000 mg at  the weekend  Pain is appropriate to this scenario  He did not seem to be too anxious but somewhat cantankerous    Electronically signed by Tyrell Palm DO on 3/20/2025 at 9:23 AM    I can be reached through [employer].

## 2025-03-20 NOTE — PLAN OF CARE
Problem: Discharge Planning  Goal: Discharge to home or other facility with appropriate resources  3/20/2025 0037 by Pepper Hastings  Outcome: Progressing  Flowsheets (Taken 3/19/2025 1930)  Discharge to home or other facility with appropriate resources: Identify barriers to discharge with patient and caregiver  3/19/2025 1230 by Christy Stone, RN  Outcome: Progressing     Problem: Pain  Goal: Verbalizes/displays adequate comfort level or baseline comfort level  Recent Flowsheet Documentation  Taken 3/19/2025 2307 by Pepper Hastings  Verbalizes/displays adequate comfort level or baseline comfort level: Encourage patient to monitor pain and request assistance  3/19/2025 1230 by Christy Stone, RN  Outcome: Progressing     Problem: ABCDS Injury Assessment  Goal: Absence of physical injury  3/19/2025 1230 by Christy Stone, RN  Outcome: Progressing     Problem: Safety - Adult  Goal: Free from fall injury  3/19/2025 1230 by Christy Stone, RN  Outcome: Progressing

## 2025-03-20 NOTE — PROGRESS NOTES
[facility]  [place]  Progress Note    SUBJECTIVE:  Chief Complaint   Patient presents with    Constipation     X3 days    Shortness of Breath    Nasal Congestion    Abdominal Pain     Mid lower abd pain      Patient is tolerating medications. No reported adverse drug reactions.  Patient is sitting up in chair, he reports he is having a great amount of pain today, currently on PCA pump  He is complaining of heartburn/indigestion - spoke with nursing  No fevers overnight    Review of systems:  As stated above in the chief complaint, otherwise negative.    Medications:  Scheduled Meds:   piperacillin-tazobactam  3,375 mg IntraVENous Q8H    And    sodium chloride (PF)  10 mL IntraVENous Q6H    acetaminophen  650 mg Oral 4 times per day    methocarbamol  1,000 mg Oral 4x Daily    buprenorphine-naloxone  2 tablet SubLINGual Daily    buprenorphine-naloxone  1 tablet SubLINGual QHS    nicotine  1 patch TransDERmal Daily    mirtazapine  30 mg Oral Nightly    tamsulosin  0.4 mg Oral Daily    sodium chloride flush  10 mL IntraVENous 2 times per day    arformoterol tartrate  15 mcg Nebulization BID RT    And    ipratropium  0.5 mg Nebulization BID     Continuous Infusions:   HYDROmorphone      sodium chloride      lactated ringers 125 mL/hr at 25 1731    sodium chloride       PRN Meds:naloxone 0.4 mg in 10 mL sodium chloride syringe, polyethylene glycol, sodium chloride, oxyCODONE **OR** oxyCODONE, hydrOXYzine pamoate, traZODone, albuterol, sodium chloride flush, sodium chloride, potassium chloride **OR** potassium alternative oral replacement **OR** potassium chloride, magnesium sulfate, ondansetron **OR** ondansetron, melatonin, fluticasone    OBJECTIVE:  BP (!) 161/88   Pulse (!) 116   Temp 98.8 °F (37.1 °C) (Oral)   Resp 27   Ht 1.778 m (5' 10\")   Wt 94.3 kg (208 lb)   SpO2 93%   BMI 29.84 kg/m²   Temp  Av.2 °F (36.8 °C)  Min: 97.3 °F (36.3 °C)  Max: 99.1 °F (37.3  °C)  Constitutional: The patient is sitting up in chair.  In mild distress secondary to pain.  Skin: Warm and dry. No rashes were noted.   HEENT: Round and reactive pupils.  Moist mucous membranes.  No ulcerations or thrush.  Neck: Supple to movements.   Chest: No use of accessory muscles to breathe. Symmetrical expansion.  Clear throughout  Cardiovascular: S1 and S2 are rhythmic and regular. No murmurs appreciated.   Abdomen: Positive bowel sounds to auscultation.  Right GARRETT drain with bloody drainage.  Ostomy with bowel sweat.  Dressings from surgical procedure.  Extremities: No edema.  Lines: Peripheral.  Jorge catheter     Laboratory and Tests:  No results found for: \"CRP\"  No results found for: \"SEDRATE\"    Radiology:  Reviewed    Microbiology:   Rapid COVID 3/15: Indeterminate  RVP 3/16: Negative  Blood cultures 3/15: Negative so far  Urine culture 3/15: Mixed armani    ASSESSMENT:  Diverticular abscess  Colovesical fistula  Bacteriuria associated to colovesical fistula  Constipation  S/p exlap with takedown colovesicular & SB fistula, R ricky, Nona's, drainage of intra-abd abscess 3/19     PLAN:  Continue IV Zosyn 3.375 g every 6 hours for now  Surgery following : s/p OR yesterday   Monitor labs  We will continue to follow    Leona Fagan, APRN - CNP  11:04 AM  3/20/2025  Pt seen and examined. Above discussed agree with advanced practice nurse. Labs, cultures, and radiographs reviewed.  Face to Face encounter occurred. Changes made as necessary.     Jorge Sherman MD

## 2025-03-20 NOTE — PROGRESS NOTES
Occupational Therapy    OCCUPATIONAL THERAPY INITIAL EVALUATION    [facility]   [address] [place], [place]         Date:3/20/2025                                                  Patient Name: Yevgeniy Johnson    MRN: [id number]    : 1960    Room: [address] [place] [address]      Evaluating OT: Alissa Arango OTR/L   [id number]      Referring Provider:César Noel MD     Specific Provider Orders/Date:OT eval and treat 3/202/2025      Diagnosis:  Shortness of breath [R06.02]  Colovesical fistula [N32.1]  Pelvic abscess in male (HCC) [K65.1]   Procedure(s):  EXPLORATORY LAPAROTOMY  COMPLETE MOBILIZATION OF SPLENIC FLEXURE  TAKEDOWN OF COLOVESICULAR FISTULA  TAKEDOWN OF  ENTEROCOLONIC FISTULA  RIGHT HEMICOLECTOMY WITH PRIMARY STAPLED ANASTOMOSIS  SIGMOIDECTOMY WITH END COLOSTOMY (NISREEN'S)  DRAINAGE OF SUBCUTANEOUS, FASCIA, AND PELVIC ABCESS  CYSTOSCOPY WITH BILATERAL URETERAL CATHETER INSERTION    Pertinent Medical History: COPD, HTN, lumbar laminectomy      Precautions:  Fall Risk, GARRETT drain, colostomy, , PCA     Assessment of current deficits    [x] Functional mobility  []ADLs  [x] Strength               []Cognition    [x] Functional transfers   [x] IADLs         [x] Safety Awareness   [x]Endurance    [] Fine Coordination              [x] Balance      [] Vision/perception   []Sensation     []Gross Motor Coordination  [] ROM  [] Delirium                   [] Motor Control     OT PLAN OF CARE   OT POC based on physician orders, patient diagnosis and results of clinical assessment    Frequency/Duration  2-4 days/wk for 2 - 4weeks PRN   Specific OT Treatment Interventions to include:   ADL retraining/adapted techniques and AE recommendations to increase functional independence within precautions                    Energy conservation techniques to improve tolerance for selfcare routine   Functional transfer/mobility training/DME recommendations for increased  independence, safety and fall prevention         Patient/family education to increase safety and functional independence             Environmental modifications for safe mobility and completion of ADLs                             Therapeutic activity to improve functional performance during ADLs.                                         Therapeutic exercise to improve tolerance and functional strength for ADLs    Balance retraining/tolerance tasks for facilitation of postural control with dynamic challenges during ADLs .      Positioning to improve functional independence         Recommended Adaptive Equipment: TBD     Home Living: Pt lives alone, 1 floor apartment   Bathroom setup: tub/shower      Prior Level of Function: Independent with ADLs , and  with IADLs; ambulated with no device    Pain Level: no pain this session ;   Cognition: A&O: 4/4;    Memory:  good    Sequencing:  good    Problem solving:  good    Judgement/safety:  fair +      Functional Assessment:  AM-PAC Daily Activity Raw Score: 16/24   Initial Eval Status  Date: 3/20/2025 Treatment Status  Date: STGs = LTGs  Time frame: 10-14 days   Feeding Clear liquid    Independent   Grooming SBA  Standing at sink brushing teeth   Independent   UB Dressing Assist with adjusting hospital gown   Independent   LB Dressing Mod A   Supervision    Bathing Min A   Supervision    Toileting Catheter, colostomy   Independent   Bed Mobility  Up in chair upon entry   SBA   Sit-supine  Independent   Functional Transfers SBA   Sit-stand from, chair  Independent/supervison    Functional Mobility SBA, w/walker   Ambulating to/from bathroom   And in the hallway   Supervision  with good tolerance    Balance Sitting:     Static:  Independent    Dynamic:SBA   Standing: SBA   Independent   Activity Tolerance No SOB observed  Good  with ADL activity    Visual/  Perceptual Glasses: yes         UE ROM/strength  AROM present throughout   Tolerate UE therapeutic activity/exercises to  medical history/social history and prior level of function, interpretation of standardized testing/informal observation of tasks, assessment of data and development of plan of care and goals.            Alissa Arango  OTR/L  OT [id number]

## 2025-03-20 NOTE — FLOWSHEET NOTE
Inpatient Wound Care (initial consult) 545    Admit Date: 3/15/2025  1:52 PM    Reason for consult:  new colostomy, abdomen incision    Patient sitting up in chair with an ice pack to stoma, awake, alert and oriented.     Significant history:  per H&P    Yevgeniy Jorge Johnson is a 64 y.o. male who is planned for fistula repair and possible colon resection today with general surgery.  Urology has been asked to place bilateral ureteral catheters for assistance with ureteral identification during the case.     Past Medical History:   Diagnosis Date    COPD (chronic obstructive pulmonary disease) (HCC)     Hypertension      Wound history:      Date of Procedure: 3/19/2025     Pre-Op Diagnosis Codes:      * Colovesical fistula [N32.1]     Post-Op Diagnosis: Same and Chronic Diverticulitis, Colovesicular Fistula, Small bowel fistula       Procedure(s):  EXPLORATORY LAPAROTOMY  COMPLETE MOBILIZATION OF SPLENIC FLEXURE  TAKEDOWN OF COLOVESICULAR FISTULA  TAKEDOWN OF  ENTEROCOLONIC FISTULA  RIGHT HEMICOLECTOMY WITH PRIMARY STAPLED ANASTOMOSIS  SIGMOIDECTOMY WITH END COLOSTOMY (NISREEN'S)  DRAINAGE OF SUBCUTANEOUS, FASCIA, AND PELVIC ABCESS  CYSTOSCOPY WITH BILATERAL URETERAL CATHETER INSERTION     Surgeon(s):  Francis Barrett MD Graff, John M, MD    Findings:     03/20/25 1122   Colostomy LLQ   Placement Date: 03/19/25   Present on Admission/Arrival: No  Location: LLQ   Stomal Appliance 1 piece;Flat;Clean, dry & intact   Stoma  Assessment Red;Moist;Other (Comment)  (purpling at 10 and 6 oclock)   Peristomal Assessment ELICEO   Mucocutaneous Junction   (ELICEO)   Stool Appearance Other (Comment);Watery;Bloody  (no stool,)   Incision 03/19/25 Abdomen Medial;Mid   Date First Assessed/Time First Assessed: 03/19/25 1335   Present on Original Admission: No  Location: Abdomen  Incision Location Orientation: Medial;Mid   Dressing Status Old drainage noted;Intact   Dressing/Treatment   (stratasorb)   Closure   (ELICEO)   Margins   (ELICEO)    Incision Assessment   (ELICEO)   Drainage Amount   (ELICEO)   Odor None   Stacy-incision Assessment   (ELICEO)     General surgery still to come in to assess first day post op. No stool output, stoma with purpling as noted above.     Patient decline for colostomy care lesson and pouch change due to pain and no stool output. Written information of ileostomy/colostomy care, foods and there effect reference chart, dietary considerations, excessive gas causes and treatments, medications and ostomy chart, ostomy support group and how to contact inpatient wound care in folder left at bedside.     Plan:   Comfort glide  Wedges  Heel protectors  Chair waffle cushion  Dietary consult  Patient will need continued preventative care   Change pouch, continue colostomy care lesson, will follow.     Leidy Britton RN 3/20/2025 1:23 PM

## 2025-03-20 NOTE — CONSULTS
Comprehensive Nutrition Assessment    Type and Reason for Visit:  Initial, Consult, Wound    Nutrition Recommendations/Plan:   Continue current diet, ADAT  Start wound healing and CL ONS BID  Will continue to monitor while inpatient     Malnutrition Assessment:  Malnutrition Status:  At risk for malnutrition (03/20/25 3805)    Context:  Acute Illness     Findings of the 6 clinical characteristics of malnutrition:  Energy Intake:  75% or less of estimated energy requirements for 7 or more days  Weight Loss:  Unable to assess (no wt hx in EMR <1 yr)     Body Fat Loss:  No body fat loss     Muscle Mass Loss:  No muscle mass loss    Fluid Accumulation:  No fluid accumulation     Strength:  Not Performed    Nutrition Assessment:    Pt w/ colovesicular fistula, diverticular abscess. Hx COPD. S/p ex lap w/ takedown of colovesicular fistula, R ricky, Nona's 3/19. Per tray observation, pt consuming 0% of meals. Continue current diet, ADAT. Will add wound healing ONS and CL ONS BID to promote protein/energy intake. Will continue to monitor.    Nutrition Related Findings:    A&O x4, missing teeth, GARRETT drain, abd rotund/tender, ostomy, +BS, no edema, -3.2 L, remeron, decreased appetite Wound Type: Surgical Incision       Current Nutrition Intake & Therapies:    Average Meal Intake: 0% (per tray observation)  Average Supplements Intake: None Ordered  ADULT DIET; Clear Liquid  ADULT ORAL NUTRITION SUPPLEMENT; Breakfast, Dinner; Clear Liquid Oral Supplement  ADULT ORAL NUTRITION SUPPLEMENT; Lunch, Dinner; Wound Healing Oral Supplement    Anthropometric Measures:  Height: 177.8 cm (5' 10\")  Ideal Body Weight (IBW): 166 lbs (75 kg)    Admission Body Weight: 97.5 kg (214 lb 15.2 oz) (3/15 bedscale)  Current Body Weight: 94.3 kg (208 lb) (3/20), 125.3 % IBW. Weight Source: Bed scale  Current BMI (kg/m2): 29.8  Usual Body Weight:  (no wt hx in EMR <1 yr)        Weight Adjustment For: No Adjustment                 BMI Categories:

## 2025-03-21 ENCOUNTER — APPOINTMENT (OUTPATIENT)
Dept: GENERAL RADIOLOGY | Age: 65
End: 2025-03-21
Payer: MEDICARE

## 2025-03-21 LAB
ALBUMIN SERPL-MCNC: 3 G/DL (ref 3.5–5.2)
ALP SERPL-CCNC: 81 U/L (ref 40–129)
ALT SERPL-CCNC: 16 U/L (ref 0–40)
ANION GAP SERPL CALCULATED.3IONS-SCNC: 12 MMOL/L (ref 7–16)
AST SERPL-CCNC: 14 U/L (ref 0–39)
BASOPHILS # BLD: 0 K/UL (ref 0–0.2)
BASOPHILS NFR BLD: 0 % (ref 0–2)
BILIRUB SERPL-MCNC: 0.4 MG/DL (ref 0–1.2)
BUN SERPL-MCNC: 10 MG/DL (ref 6–23)
CALCIUM SERPL-MCNC: 8.9 MG/DL (ref 8.6–10.2)
CHLORIDE SERPL-SCNC: 101 MMOL/L (ref 98–107)
CO2 SERPL-SCNC: 24 MMOL/L (ref 22–29)
CREAT SERPL-MCNC: 0.6 MG/DL (ref 0.7–1.2)
EOSINOPHIL # BLD: 0 K/UL (ref 0.05–0.5)
EOSINOPHILS RELATIVE PERCENT: 0 % (ref 0–6)
ERYTHROCYTE [DISTWIDTH] IN BLOOD BY AUTOMATED COUNT: 13.2 % (ref 11.5–15)
GFR, ESTIMATED: >90 ML/MIN/1.73M2
GLUCOSE SERPL-MCNC: 130 MG/DL (ref 74–99)
HCT VFR BLD AUTO: 36.4 % (ref 37–54)
HGB BLD-MCNC: 11.6 G/DL (ref 12.5–16.5)
LYMPHOCYTES NFR BLD: 0.34 K/UL (ref 1.5–4)
LYMPHOCYTES RELATIVE PERCENT: 2 % (ref 20–42)
MAGNESIUM SERPL-MCNC: 2.1 MG/DL (ref 1.6–2.6)
MCH RBC QN AUTO: 29.4 PG (ref 26–35)
MCHC RBC AUTO-ENTMCNC: 31.9 G/DL (ref 32–34.5)
MCV RBC AUTO: 92.2 FL (ref 80–99.9)
MICROORGANISM SPEC CULT: NORMAL
MONOCYTES NFR BLD: 0.51 K/UL (ref 0.1–0.95)
MONOCYTES NFR BLD: 3 % (ref 2–12)
NEUTROPHILS NFR BLD: 96 % (ref 43–80)
NEUTS SEG NFR BLD: 18.56 K/UL (ref 1.8–7.3)
PLATELET # BLD AUTO: 377 K/UL (ref 130–450)
PMV BLD AUTO: 9 FL (ref 7–12)
POTASSIUM SERPL-SCNC: 3.4 MMOL/L (ref 3.5–5)
PROT SERPL-MCNC: 6.6 G/DL (ref 6.4–8.3)
RBC # BLD AUTO: 3.95 M/UL (ref 3.8–5.8)
RBC # BLD: ABNORMAL 10*6/UL
SERVICE CMNT-IMP: NORMAL
SERVICE CMNT-IMP: NORMAL
SODIUM SERPL-SCNC: 137 MMOL/L (ref 132–146)
SPECIMEN DESCRIPTION: NORMAL
WBC OTHER # BLD: 19.4 K/UL (ref 4.5–11.5)

## 2025-03-21 PROCEDURE — 6360000002 HC RX W HCPCS: Performed by: STUDENT IN AN ORGANIZED HEALTH CARE EDUCATION/TRAINING PROGRAM

## 2025-03-21 PROCEDURE — 74018 RADEX ABDOMEN 1 VIEW: CPT

## 2025-03-21 PROCEDURE — 6370000000 HC RX 637 (ALT 250 FOR IP): Performed by: STUDENT IN AN ORGANIZED HEALTH CARE EDUCATION/TRAINING PROGRAM

## 2025-03-21 PROCEDURE — 83735 ASSAY OF MAGNESIUM: CPT

## 2025-03-21 PROCEDURE — 85025 COMPLETE CBC W/AUTO DIFF WBC: CPT

## 2025-03-21 PROCEDURE — 2700000000 HC OXYGEN THERAPY PER DAY

## 2025-03-21 PROCEDURE — 2500000003 HC RX 250 WO HCPCS: Performed by: STUDENT IN AN ORGANIZED HEALTH CARE EDUCATION/TRAINING PROGRAM

## 2025-03-21 PROCEDURE — 2580000003 HC RX 258: Performed by: STUDENT IN AN ORGANIZED HEALTH CARE EDUCATION/TRAINING PROGRAM

## 2025-03-21 PROCEDURE — 71045 X-RAY EXAM CHEST 1 VIEW: CPT

## 2025-03-21 PROCEDURE — 6360000002 HC RX W HCPCS: Performed by: NURSE PRACTITIONER

## 2025-03-21 PROCEDURE — 80053 COMPREHEN METABOLIC PANEL: CPT

## 2025-03-21 PROCEDURE — 1200000000 HC SEMI PRIVATE

## 2025-03-21 PROCEDURE — 6360000002 HC RX W HCPCS: Performed by: PHYSICIAN ASSISTANT

## 2025-03-21 RX ORDER — LIDOCAINE HYDROCHLORIDE 20 MG/ML
JELLY TOPICAL ONCE
Status: COMPLETED | OUTPATIENT
Start: 2025-03-21 | End: 2025-03-21

## 2025-03-21 RX ORDER — LORAZEPAM 0.5 MG/1
0.25 TABLET ORAL ONCE
Status: DISCONTINUED | OUTPATIENT
Start: 2025-03-21 | End: 2025-03-21

## 2025-03-21 RX ORDER — POTASSIUM CHLORIDE 7.45 MG/ML
10 INJECTION INTRAVENOUS
Status: COMPLETED | OUTPATIENT
Start: 2025-03-21 | End: 2025-03-21

## 2025-03-21 RX ORDER — LORAZEPAM 2 MG/ML
0.25 INJECTION INTRAMUSCULAR ONCE
Status: COMPLETED | OUTPATIENT
Start: 2025-03-21 | End: 2025-03-21

## 2025-03-21 RX ORDER — OXYMETAZOLINE HYDROCHLORIDE 0.05 G/100ML
2 SPRAY NASAL ONCE
Status: COMPLETED | OUTPATIENT
Start: 2025-03-21 | End: 2025-03-21

## 2025-03-21 RX ADMIN — POTASSIUM CHLORIDE 10 MEQ: 10 INJECTION, SOLUTION INTRAVENOUS at 11:02

## 2025-03-21 RX ADMIN — ENOXAPARIN SODIUM 40 MG: 100 INJECTION SUBCUTANEOUS at 08:30

## 2025-03-21 RX ADMIN — SODIUM CHLORIDE, PRESERVATIVE FREE 10 ML: 5 INJECTION INTRAVENOUS at 22:55

## 2025-03-21 RX ADMIN — BUPRENORPHINE HYDROCHLORIDE AND NALOXONE HYDROCHLORIDE DIHYDRATE 2 TABLET: 8; 2 TABLET SUBLINGUAL at 08:32

## 2025-03-21 RX ADMIN — PIPERACILLIN AND TAZOBACTAM 3375 MG: 3; .375 INJECTION, POWDER, LYOPHILIZED, FOR SOLUTION INTRAVENOUS at 11:03

## 2025-03-21 RX ADMIN — PIPERACILLIN AND TAZOBACTAM 3375 MG: 3; .375 INJECTION, POWDER, LYOPHILIZED, FOR SOLUTION INTRAVENOUS at 19:47

## 2025-03-21 RX ADMIN — ONDANSETRON 4 MG: 2 INJECTION INTRAMUSCULAR; INTRAVENOUS at 08:30

## 2025-03-21 RX ADMIN — PIPERACILLIN AND TAZOBACTAM 3375 MG: 3; .375 INJECTION, POWDER, LYOPHILIZED, FOR SOLUTION INTRAVENOUS at 03:16

## 2025-03-21 RX ADMIN — SODIUM CHLORIDE, PRESERVATIVE FREE 10 ML: 5 INJECTION INTRAVENOUS at 19:48

## 2025-03-21 RX ADMIN — SODIUM CHLORIDE, PRESERVATIVE FREE 10 ML: 5 INJECTION INTRAVENOUS at 11:03

## 2025-03-21 RX ADMIN — LORAZEPAM 0.26 MG: 2 INJECTION INTRAMUSCULAR; INTRAVENOUS at 23:27

## 2025-03-21 RX ADMIN — Medication: at 20:23

## 2025-03-21 RX ADMIN — LIDOCAINE HYDROCHLORIDE: 20 JELLY TOPICAL at 09:40

## 2025-03-21 RX ADMIN — SODIUM CHLORIDE, POTASSIUM CHLORIDE, SODIUM LACTATE AND CALCIUM CHLORIDE: 600; 310; 30; 20 INJECTION, SOLUTION INTRAVENOUS at 03:21

## 2025-03-21 RX ADMIN — OXYMETAZOLINE HYDROCHLORIDE 2 SPRAY: 0.5 SPRAY NASAL at 09:42

## 2025-03-21 RX ADMIN — POTASSIUM CHLORIDE 10 MEQ: 10 INJECTION, SOLUTION INTRAVENOUS at 12:21

## 2025-03-21 RX ADMIN — POTASSIUM CHLORIDE 10 MEQ: 10 INJECTION, SOLUTION INTRAVENOUS at 09:50

## 2025-03-21 RX ADMIN — BUPRENORPHINE HYDROCHLORIDE AND NALOXONE HYDROCHLORIDE DIHYDRATE 1 TABLET: 8; 2 TABLET SUBLINGUAL at 22:47

## 2025-03-21 RX ADMIN — SODIUM CHLORIDE, POTASSIUM CHLORIDE, SODIUM LACTATE AND CALCIUM CHLORIDE: 600; 310; 30; 20 INJECTION, SOLUTION INTRAVENOUS at 23:38

## 2025-03-21 ASSESSMENT — PAIN DESCRIPTION - ORIENTATION: ORIENTATION: RIGHT;LOWER

## 2025-03-21 ASSESSMENT — PAIN DESCRIPTION - DESCRIPTORS: DESCRIPTORS: ACHING;DISCOMFORT

## 2025-03-21 ASSESSMENT — PAIN - FUNCTIONAL ASSESSMENT: PAIN_FUNCTIONAL_ASSESSMENT: PREVENTS OR INTERFERES SOME ACTIVE ACTIVITIES AND ADLS

## 2025-03-21 ASSESSMENT — PAIN DESCRIPTION - LOCATION: LOCATION: ABDOMEN;BACK

## 2025-03-21 ASSESSMENT — PAIN SCALES - GENERAL: PAINLEVEL_OUTOF10: 7

## 2025-03-21 ASSESSMENT — PAIN DESCRIPTION - FREQUENCY: FREQUENCY: CONTINUOUS

## 2025-03-21 ASSESSMENT — PAIN DESCRIPTION - PAIN TYPE: TYPE: SURGICAL PAIN

## 2025-03-21 ASSESSMENT — PAIN DESCRIPTION - ONSET: ONSET: ON-GOING

## 2025-03-21 NOTE — PROGRESS NOTES
Physical Therapy  Facility/Department: [facility]    Name: Yevgeniy Johnson  : 1960  MRN: [id number]  Date of Service: 3/21/2025    PT eval was attempted this afternoon and pt was lying in bed with stomach bloated and he reports physician just placed NG tube in hopes to decompress his stomach.  He does not want to get up at this time until stomach bloating subsides.  Will re-attempt PT eval another time.    Hernesto Pavon Jr., P.T.  License Number: [id number]

## 2025-03-21 NOTE — PROGRESS NOTES
General Surgery   Daily Progress Note      Patient's Name/Date of Birth: Yevgeniy Johnson / 1960    Date: March 21, 2025       Subjective: Vomitted overnight. Still having some abdominal pain. Good UOP. No OP from ostomy. Drain still SS. Still feels a bit nauseated.     Objective:  BP (!) 161/84   Pulse (!) 103   Temp 99.1 °F (37.3 °C) (Axillary)   Resp 18   Ht 1.778 m (5' 10\")   Wt 107.5 kg (236 lb 15.9 oz)   SpO2 93%   BMI 34.01 kg/m²   Labs:  Recent Labs     03/19/25  0522 03/20/25  0741 03/21/25  0428   WBC 7.3 18.6* 19.4*   HGB 13.1 12.1* 11.6*   HCT 40.9 37.8 36.4*     Recent Labs     03/19/25  0522 03/20/25  0741 03/21/25  0428    137 137   K 4.2 3.6 3.4*    99 101   CO2 27 24 24   BUN 6 8 10   CREATININE 0.8 0.7 0.6*     Recent Labs     03/19/25  0522 03/20/25  0741 03/21/25  0428   ALKPHOS 75 76 81   ALT 12 17 16   AST 10 15 14   BILITOT 0.3 0.4 0.4         General appearance: NAD  Head: NCAT, PERRL, EOMI, pink conjunctiva  Neck: supple, no masses  Lungs: symmetric chest rise, no audible wheezes, on room air  Heart: warm throughout  Abdomen: soft, moderately distended, appropriately tender, ostomy pink and viable, bowel sweat in ostomy bag  Skin: no visible lesions  Extremities: extremities normal, atraumatic, no cyanosis or edema      Assessment/Plan:  Yevgeniy Johnson is a 64 y.o. male w colovesical fistula s/p exlap with takedown colovesicular & SB fistula, R ricky, Nona's, drainage of intra-abd abscess 3/19    Maintain raphael 2wks  KUB, will back down to NPO for now  Possible NGT today  Prn and scheduled analgesia  Encourage OOB, ambulation    Gale Ramirez DO  General Surgery Resident

## 2025-03-21 NOTE — PROGRESS NOTES
ET Nurse: attempted to see patient for lesson, patient nauseated with NG tube draining into basin. Awaiting results of xray to for NG placement to hook up to suction. Will follow.

## 2025-03-21 NOTE — CARE COORDINATION
Social Work:    Chart update. S/p exlap with takedown colovesicular & SB fistula, R ricky, Nona's, drainage of intra-abd abscess 3-19.  ID, general surgery continue to follow. NG inserted today.  Plan is home with [facility]. Social work/ will need to confirm plans, as well as any possible DME needs.    Electronically signed by MAYTE Langley on 3/21/2025 at 11:51 AM

## 2025-03-21 NOTE — PROGRESS NOTES
3/21/2025 12:28 PM  Yevgeniy Johnson  [id number]    Subjective:    He is laying in bed   No issues urinating     Review of Systems  Constitutional: No fever or chills   Respiratory: negative for cough and hemoptysis  Cardiovascular: negative for chest pain and dyspnea  Gastrointestinal: negative for abdominal pain, diarrhea, nausea and vomiting   : See above  Derm: negative for rash and skin lesion(s)  Neurological: negative for seizures and tremors  Musculoskeletal: Negative    Psychiatric: Negative   All other reviews are negative      Scheduled Meds:   enoxaparin  40 mg SubCUTAneous Daily    piperacillin-tazobactam  3,375 mg IntraVENous Q8H    And    sodium chloride (PF)  10 mL IntraVENous Q6H    acetaminophen  650 mg Oral 4 times per day    [Held by provider] methocarbamol  1,000 mg Oral 4x Daily    buprenorphine-naloxone  2 tablet SubLINGual Daily    buprenorphine-naloxone  1 tablet SubLINGual QHS    nicotine  1 patch TransDERmal Daily    [Held by provider] mirtazapine  30 mg Oral Nightly    [Held by provider] tamsulosin  0.4 mg Oral Daily    sodium chloride flush  10 mL IntraVENous 2 times per day    arformoterol tartrate  15 mcg Nebulization BID RT    And    ipratropium  0.5 mg Nebulization BID       Objective:  Vitals:    03/21/25 0720   BP: (!) 139/91   Pulse: (!) 101   Resp: 20   Temp: 97.3 °F (36.3 °C)   SpO2: 92%         Allergies: Citalopram, Gabapentin, and Ketorolac tromethamine    General Appearance: alert and oriented to person, place and time and in no acute distress  Skin: no rash or erythema  Head: normocephalic and atraumatic, NG tube   Pulmonary/Chest: normal air movement, no respiratory distress  Abdomen: soft, non-tender, non-distended  Genitourinary: no raphael   Extremities: no cyanosis, clubbing or edema         Labs:     Recent Labs     03/21/25  0428      K 3.4*      CO2 24   BUN 10   CREATININE 0.6*   GLUCOSE 130*   CALCIUM 8.9       Lab Results   Component Value

## 2025-03-21 NOTE — PROGRESS NOTES
Pt upset about pca pump CO2 alarm keeps going off. Pt sates he wants to turn the pump off. Advised pt to keep the nasal canula in and take breaths through nose and the alarm wont go off. Also advised pt that because he just received a dose of pain meds it is strongly advised to keep the oxygen on.

## 2025-03-21 NOTE — PROGRESS NOTES
[facility]  [facility]  Progress Note    SUBJECTIVE:  Chief Complaint   Patient presents with    Constipation     X3 days    Shortness of Breath    Nasal Congestion    Abdominal Pain     Mid lower abd pain      Patient is sitting up in bed.  He is resting quietly.  Had NG tube placed.  Had emesis and nausea.  KUB shows adynamic ileus  No fevers  Still on PCA with abdominal pain   Has been tachycardic    Review of systems:  As stated above in the chief complaint, otherwise negative.    Medications:  Scheduled Meds:   potassium chloride  10 mEq IntraVENous Q1H    enoxaparin  40 mg SubCUTAneous Daily    piperacillin-tazobactam  3,375 mg IntraVENous Q8H    And    sodium chloride (PF)  10 mL IntraVENous Q6H    acetaminophen  650 mg Oral 4 times per day    [Held by provider] methocarbamol  1,000 mg Oral 4x Daily    buprenorphine-naloxone  2 tablet SubLINGual Daily    buprenorphine-naloxone  1 tablet SubLINGual QHS    nicotine  1 patch TransDERmal Daily    [Held by provider] mirtazapine  30 mg Oral Nightly    [Held by provider] tamsulosin  0.4 mg Oral Daily    sodium chloride flush  10 mL IntraVENous 2 times per day    arformoterol tartrate  15 mcg Nebulization BID RT    And    ipratropium  0.5 mg Nebulization BID     Continuous Infusions:   HYDROmorphone      sodium chloride      lactated ringers 125 mL/hr at 03/21/25 0321    sodium chloride       PRN Meds:naloxone 0.4 mg in 10 mL sodium chloride syringe, polyethylene glycol, calcium carbonate, sodium chloride, oxyCODONE **OR** oxyCODONE, hydrOXYzine pamoate, traZODone, albuterol, sodium chloride flush, sodium chloride, potassium chloride **OR** potassium alternative oral replacement **OR** potassium chloride, magnesium sulfate, ondansetron **OR** ondansetron, melatonin, fluticasone    OBJECTIVE:  BP (!) 139/91   Pulse (!) 101   Temp 97.3 °F (36.3 °C) (Temporal) Comment (Src): pt coughing  Resp 20   Ht 1.778 m (5' 10\")   Wt 107.5 kg  (236 lb 15.9 oz)   SpO2 92%   BMI 34.01 kg/m²   Temp  Av.5 °F (36.9 °C)  Min: 97.3 °F (36.3 °C)  Max: 99.1 °F (37.3 °C)  Constitutional: The patient is resting quietly in bed.  Awaken to name.  Alert.  Skin: Warm and dry. No rashes were noted.   HEENT: Round and reactive pupils.  Moist mucous membranes.  No ulcerations or thrush.  NG tube in place  Neck: Supple to movements.   Chest: No use of accessory muscles to breathe. Symmetrical expansion.  Clear throughout  Cardiovascular: S1 and S2 are rhythmic and regular. No murmurs appreciated.   Abdomen: bowel sounds to auscultation.  Right GARRETT drain with bloody drainage.  Ostomy with bowel sweat.  Dressings from surgical procedure.  Distended  Extremities: No edema.  Lines: Peripheral.  Jorge catheter with tyson colored urine. No longer feculent     Laboratory and Tests:  Reviewed    Radiology:  Reviewed    Microbiology:   Rapid COVID 3/15: Indeterminate  RVP 3/16: Negative  Blood cultures 3/15: Negative so far  Urine culture 3/15: Mixed armani    ASSESSMENT:  Diverticular abscess  Colovesical fistula  Bacteriuria associated to colovesical fistula  Constipation  S/p exlap with takedown colovesicular & SB fistula, R ricky, Nona's, drainage of intra-abd abscess 3/19   Postop ileus  Leukocytosis, likely reactive postop, pain, and ileus    PLAN:  Continue IV Zosyn 3.375 g every 6 hours for now  Monitor labs  We will continue to follow    STUART Delacruz - CNP  11:25 AM  3/21/2025    Pt seen and examined. Above discussed agree with advanced practice nurse. Labs, cultures, and radiographs reviewed.  Face to Face encounter occurred. Changes made as necessary.     Jorge Sherman MD

## 2025-03-21 NOTE — PLAN OF CARE
Problem: Pain  Goal: Verbalizes/displays adequate comfort level or baseline comfort level  3/21/2025 1046 by Christy Stone RN  Outcome: Progressing  3/20/2025 2134 by Eric Fritz RN  Outcome: Progressing     Problem: ABCDS Injury Assessment  Goal: Absence of physical injury  3/21/2025 1046 by Christy Stone RN  Outcome: Progressing  3/20/2025 2134 by Eric Fritz RN  Outcome: Progressing     Problem: Safety - Adult  Goal: Free from fall injury  Outcome: Progressing     Problem: Nutrition Deficit:  Goal: Optimize nutritional status  Outcome: Progressing

## 2025-03-21 NOTE — PLAN OF CARE
Problem: Discharge Planning  Goal: Discharge to home or other facility with appropriate resources  3/20/2025 2134 by Eric Fritz RN  Outcome: Progressing  3/20/2025 1245 by Christy Stone RN  Outcome: Progressing     Problem: Pain  Goal: Verbalizes/displays adequate comfort level or baseline comfort level  3/20/2025 2134 by Eric Fritz RN  Outcome: Progressing  3/20/2025 1245 by Christy Stone RN  Outcome: Progressing  Flowsheets (Taken 3/19/2025 2307 by Pepper Hastings)  Verbalizes/displays adequate comfort level or baseline comfort level: Encourage patient to monitor pain and request assistance     Problem: ABCDS Injury Assessment  Goal: Absence of physical injury  3/20/2025 2134 by Eric Fritz RN  Outcome: Progressing  3/20/2025 1245 by Christy Stone RN  Outcome: Progressing     Problem: Safety - Adult  Goal: Free from fall injury  3/20/2025 1245 by Christy Stone RN  Outcome: Progressing

## 2025-03-21 NOTE — PROGRESS NOTES
Internal Medicine Progress Note    Patient's name: Yevgeniy Johnson  : 1960  Chief complaints (on day of admission): Constipation (X3 days), Shortness of Breath, Nasal Congestion, and Abdominal Pain (Mid lower abd pain )  Admission date: 3/15/2025  Date of service: 3/21/2025   Room: [address] MED SURG/TELE  Primary care physician: Tanvir Levy MD  Reason for visit: Follow-up for colovesical fistula/diverticular abscess    Subjective  Yevgeniy is seen lying in bed asleep, in no distress. He does easily awaken to voice. He reports generalized not feeling well, had a \"rough night\". He reports nausea and vomiting overnight, still feels nauseated and stomach is \"bloated\". He denies any fever or chills. Denies any shortness of breath or difficulty breathing. In discussion with nursing he has been receiving the Zofran with some improvement in his nausea. Surgery was in this morning and is planning on a KUB and possible NG placement pending results. No other issues or concerns from nursing.    Review of Systems  Full 10 point review of systems negative unless mentioned above.    Hospital Medications  Current Facility-Administered Medications   Medication Dose Route Frequency Provider Last Rate Last Admin    naloxone 0.4 mg in 10 mL sodium chloride syringe   IntraVENous PRN César Noel MD        polyethylene glycol (GLYCOLAX) packet 17 g  17 g Oral Daily PRN César Noel MD        HYDROmorphone (DILAUDID) 1 mg/mL PCA   IntraVENous Continuous César Noel MD   Rate Verify at 25 191    enoxaparin (LOVENOX) injection 40 mg  40 mg SubCUTAneous Daily César Noel MD   40 mg at 25 1359    calcium carbonate (TUMS) chewable tablet 500 mg  500 mg Oral 4x Daily PRN Tyrell Palm DO   500 mg at 25    0.9 % sodium chloride infusion   IntraVENous PRN César Noel MD        piperacillin-tazobactam (ZOSYN) injection 3,375 mg  3,375 mg IntraVENous Q8H César Noel MD    were discussed with Dr. Palm.    Electronically signed by STUART Ji CNP on 3/21/2025 at 7:48 AM

## 2025-03-22 ENCOUNTER — APPOINTMENT (OUTPATIENT)
Dept: GENERAL RADIOLOGY | Age: 65
End: 2025-03-22
Payer: MEDICARE

## 2025-03-22 LAB
ALBUMIN SERPL-MCNC: 2.6 G/DL (ref 3.5–5.2)
ALP SERPL-CCNC: 76 U/L (ref 40–129)
ALT SERPL-CCNC: 13 U/L (ref 0–40)
ANION GAP SERPL CALCULATED.3IONS-SCNC: 11 MMOL/L (ref 7–16)
AST SERPL-CCNC: 14 U/L (ref 0–39)
BASOPHILS # BLD: 0.04 K/UL (ref 0–0.2)
BASOPHILS NFR BLD: 0 % (ref 0–2)
BILIRUB SERPL-MCNC: 0.3 MG/DL (ref 0–1.2)
BUN SERPL-MCNC: 14 MG/DL (ref 6–23)
CALCIUM SERPL-MCNC: 8.6 MG/DL (ref 8.6–10.2)
CHLORIDE SERPL-SCNC: 101 MMOL/L (ref 98–107)
CO2 SERPL-SCNC: 30 MMOL/L (ref 22–29)
CREAT SERPL-MCNC: 0.6 MG/DL (ref 0.7–1.2)
EOSINOPHIL # BLD: 0.13 K/UL (ref 0.05–0.5)
EOSINOPHILS RELATIVE PERCENT: 1 % (ref 0–6)
ERYTHROCYTE [DISTWIDTH] IN BLOOD BY AUTOMATED COUNT: 13.1 % (ref 11.5–15)
GFR, ESTIMATED: >90 ML/MIN/1.73M2
GLUCOSE SERPL-MCNC: 92 MG/DL (ref 74–99)
HCT VFR BLD AUTO: 33 % (ref 37–54)
HGB BLD-MCNC: 10.4 G/DL (ref 12.5–16.5)
IMM GRANULOCYTES # BLD AUTO: 0.08 K/UL (ref 0–0.58)
IMM GRANULOCYTES NFR BLD: 1 % (ref 0–5)
LYMPHOCYTES NFR BLD: 0.8 K/UL (ref 1.5–4)
LYMPHOCYTES RELATIVE PERCENT: 6 % (ref 20–42)
MAGNESIUM SERPL-MCNC: 2.1 MG/DL (ref 1.6–2.6)
MCH RBC QN AUTO: 29.1 PG (ref 26–35)
MCHC RBC AUTO-ENTMCNC: 31.5 G/DL (ref 32–34.5)
MCV RBC AUTO: 92.4 FL (ref 80–99.9)
MONOCYTES NFR BLD: 0.9 K/UL (ref 0.1–0.95)
MONOCYTES NFR BLD: 7 % (ref 2–12)
NEUTROPHILS NFR BLD: 86 % (ref 43–80)
NEUTS SEG NFR BLD: 11.75 K/UL (ref 1.8–7.3)
PLATELET # BLD AUTO: 330 K/UL (ref 130–450)
PMV BLD AUTO: 9 FL (ref 7–12)
POTASSIUM SERPL-SCNC: 3.1 MMOL/L (ref 3.5–5)
PROT SERPL-MCNC: 5.7 G/DL (ref 6.4–8.3)
RBC # BLD AUTO: 3.57 M/UL (ref 3.8–5.8)
SODIUM SERPL-SCNC: 142 MMOL/L (ref 132–146)
WBC OTHER # BLD: 13.7 K/UL (ref 4.5–11.5)

## 2025-03-22 PROCEDURE — 6370000000 HC RX 637 (ALT 250 FOR IP): Performed by: STUDENT IN AN ORGANIZED HEALTH CARE EDUCATION/TRAINING PROGRAM

## 2025-03-22 PROCEDURE — 2700000000 HC OXYGEN THERAPY PER DAY

## 2025-03-22 PROCEDURE — 6360000002 HC RX W HCPCS: Performed by: STUDENT IN AN ORGANIZED HEALTH CARE EDUCATION/TRAINING PROGRAM

## 2025-03-22 PROCEDURE — 1200000000 HC SEMI PRIVATE

## 2025-03-22 PROCEDURE — 83735 ASSAY OF MAGNESIUM: CPT

## 2025-03-22 PROCEDURE — 94640 AIRWAY INHALATION TREATMENT: CPT

## 2025-03-22 PROCEDURE — 71045 X-RAY EXAM CHEST 1 VIEW: CPT

## 2025-03-22 PROCEDURE — 6360000002 HC RX W HCPCS: Performed by: NURSE PRACTITIONER

## 2025-03-22 PROCEDURE — 2500000003 HC RX 250 WO HCPCS: Performed by: STUDENT IN AN ORGANIZED HEALTH CARE EDUCATION/TRAINING PROGRAM

## 2025-03-22 PROCEDURE — 80053 COMPREHEN METABOLIC PANEL: CPT

## 2025-03-22 PROCEDURE — 85025 COMPLETE CBC W/AUTO DIFF WBC: CPT

## 2025-03-22 PROCEDURE — 2580000003 HC RX 258: Performed by: STUDENT IN AN ORGANIZED HEALTH CARE EDUCATION/TRAINING PROGRAM

## 2025-03-22 RX ORDER — POTASSIUM CHLORIDE 7.45 MG/ML
10 INJECTION INTRAVENOUS
Status: COMPLETED | OUTPATIENT
Start: 2025-03-22 | End: 2025-03-22

## 2025-03-22 RX ORDER — ENOXAPARIN SODIUM 100 MG/ML
30 INJECTION SUBCUTANEOUS 2 TIMES DAILY
Status: DISCONTINUED | OUTPATIENT
Start: 2025-03-22 | End: 2025-03-28 | Stop reason: HOSPADM

## 2025-03-22 RX ADMIN — PIPERACILLIN AND TAZOBACTAM 3375 MG: 3; .375 INJECTION, POWDER, LYOPHILIZED, FOR SOLUTION INTRAVENOUS at 18:55

## 2025-03-22 RX ADMIN — ARFORMOTEROL TARTRATE 15 MCG: 15 SOLUTION RESPIRATORY (INHALATION) at 09:43

## 2025-03-22 RX ADMIN — BUPRENORPHINE HYDROCHLORIDE AND NALOXONE HYDROCHLORIDE DIHYDRATE 2 TABLET: 8; 2 TABLET SUBLINGUAL at 08:26

## 2025-03-22 RX ADMIN — PIPERACILLIN AND TAZOBACTAM 3375 MG: 3; .375 INJECTION, POWDER, LYOPHILIZED, FOR SOLUTION INTRAVENOUS at 03:33

## 2025-03-22 RX ADMIN — POTASSIUM CHLORIDE 10 MEQ: 10 INJECTION, SOLUTION INTRAVENOUS at 11:01

## 2025-03-22 RX ADMIN — POTASSIUM CHLORIDE 10 MEQ: 10 INJECTION, SOLUTION INTRAVENOUS at 12:22

## 2025-03-22 RX ADMIN — ENOXAPARIN SODIUM 30 MG: 100 INJECTION SUBCUTANEOUS at 08:26

## 2025-03-22 RX ADMIN — SODIUM CHLORIDE, POTASSIUM CHLORIDE, SODIUM LACTATE AND CALCIUM CHLORIDE: 600; 310; 30; 20 INJECTION, SOLUTION INTRAVENOUS at 08:01

## 2025-03-22 RX ADMIN — IPRATROPIUM BROMIDE 0.5 MG: 0.5 SOLUTION RESPIRATORY (INHALATION) at 20:03

## 2025-03-22 RX ADMIN — OXYCODONE HYDROCHLORIDE 10 MG: 5 TABLET ORAL at 11:24

## 2025-03-22 RX ADMIN — ARFORMOTEROL TARTRATE 15 MCG: 15 SOLUTION RESPIRATORY (INHALATION) at 20:03

## 2025-03-22 RX ADMIN — SODIUM CHLORIDE, PRESERVATIVE FREE 10 ML: 5 INJECTION INTRAVENOUS at 10:57

## 2025-03-22 RX ADMIN — SODIUM CHLORIDE, PRESERVATIVE FREE 10 ML: 5 INJECTION INTRAVENOUS at 18:55

## 2025-03-22 RX ADMIN — POTASSIUM CHLORIDE 10 MEQ: 10 INJECTION, SOLUTION INTRAVENOUS at 09:48

## 2025-03-22 RX ADMIN — SODIUM CHLORIDE, PRESERVATIVE FREE 10 ML: 5 INJECTION INTRAVENOUS at 03:33

## 2025-03-22 RX ADMIN — BUPRENORPHINE HYDROCHLORIDE AND NALOXONE HYDROCHLORIDE DIHYDRATE 1 TABLET: 8; 2 TABLET SUBLINGUAL at 20:25

## 2025-03-22 RX ADMIN — PIPERACILLIN AND TAZOBACTAM 3375 MG: 3; .375 INJECTION, POWDER, LYOPHILIZED, FOR SOLUTION INTRAVENOUS at 10:57

## 2025-03-22 RX ADMIN — HYDROXYZINE PAMOATE 25 MG: 25 CAPSULE ORAL at 18:08

## 2025-03-22 RX ADMIN — IPRATROPIUM BROMIDE 0.5 MG: 0.5 SOLUTION RESPIRATORY (INHALATION) at 09:44

## 2025-03-22 RX ADMIN — SODIUM CHLORIDE, PRESERVATIVE FREE 10 ML: 5 INJECTION INTRAVENOUS at 08:26

## 2025-03-22 RX ADMIN — ENOXAPARIN SODIUM 30 MG: 100 INJECTION SUBCUTANEOUS at 20:25

## 2025-03-22 RX ADMIN — SODIUM CHLORIDE, POTASSIUM CHLORIDE, SODIUM LACTATE AND CALCIUM CHLORIDE: 600; 310; 30; 20 INJECTION, SOLUTION INTRAVENOUS at 18:05

## 2025-03-22 RX ADMIN — Medication: at 20:50

## 2025-03-22 RX ADMIN — Medication 3 MG: at 20:50

## 2025-03-22 ASSESSMENT — PAIN SCALES - GENERAL
PAINLEVEL_OUTOF10: 8
PAINLEVEL_OUTOF10: 8
PAINLEVEL_OUTOF10: 9
PAINLEVEL_OUTOF10: 8

## 2025-03-22 NOTE — PROGRESS NOTES
[facility]  [facility]  Progress Note    SUBJECTIVE:  Chief Complaint   Patient presents with    Constipation     X3 days    Shortness of Breath    Nasal Congestion    Abdominal Pain     Mid lower abd pain      Patient resting in bed.  No distress.  Seems to be tolerating medications.  NG to suction.    Review of systems:  As stated above in the chief complaint, otherwise negative.    Medications:  Scheduled Meds:   enoxaparin  30 mg SubCUTAneous BID    potassium chloride  10 mEq IntraVENous Q1H    piperacillin-tazobactam  3,375 mg IntraVENous Q8H    And    sodium chloride (PF)  10 mL IntraVENous Q6H    acetaminophen  650 mg Oral 4 times per day    [Held by provider] methocarbamol  1,000 mg Oral 4x Daily    buprenorphine-naloxone  2 tablet SubLINGual Daily    buprenorphine-naloxone  1 tablet SubLINGual QHS    nicotine  1 patch TransDERmal Daily    [Held by provider] mirtazapine  30 mg Oral Nightly    [Held by provider] tamsulosin  0.4 mg Oral Daily    sodium chloride flush  10 mL IntraVENous 2 times per day    arformoterol tartrate  15 mcg Nebulization BID RT    And    ipratropium  0.5 mg Nebulization BID     Continuous Infusions:   HYDROmorphone      sodium chloride      lactated ringers 125 mL/hr at 25 0801    sodium chloride       PRN Meds:naloxone 0.4 mg in 10 mL sodium chloride syringe, polyethylene glycol, calcium carbonate, sodium chloride, oxyCODONE **OR** oxyCODONE, hydrOXYzine pamoate, traZODone, albuterol, sodium chloride flush, sodium chloride, potassium chloride **OR** potassium alternative oral replacement **OR** potassium chloride, magnesium sulfate, ondansetron **OR** ondansetron, melatonin, fluticasone    OBJECTIVE:  /79   Pulse 96   Temp 98.6 °F (37 °C) (Oral)   Resp 20   Ht 1.778 m (5' 10\")   Wt 103.9 kg (229 lb 0.9 oz)   SpO2 92%   BMI 32.87 kg/m²   Temp  Av.5 °F (36.9 °C)  Min: 98.1 °F (36.7 °C)  Max: 98.8 °F (37.1 °C)  Constitutional:

## 2025-03-22 NOTE — PLAN OF CARE
Problem: Discharge Planning  Goal: Discharge to home or other facility with appropriate resources  3/22/2025 1348 by Waldemar Prather RN  Outcome: Progressing  3/22/2025 0438 by Gretchen Arcos RN  Outcome: Progressing     Problem: Pain  Goal: Verbalizes/displays adequate comfort level or baseline comfort level  3/22/2025 1348 by Waldemar Prather RN  Outcome: Progressing  3/22/2025 0438 by Gretchen Arcos RN  Outcome: Progressing     Problem: ABCDS Injury Assessment  Goal: Absence of physical injury  3/22/2025 1348 by Waldemar Prather RN  Outcome: Progressing  3/22/2025 0438 by Gretchen Arcos RN  Outcome: Progressing     Problem: Safety - Adult  Goal: Free from fall injury  3/22/2025 1348 by Waldemar Prather RN  Outcome: Progressing  3/22/2025 0438 by Gretchen Arcos, RN  Outcome: Progressing     Problem: Nutrition Deficit:  Goal: Optimize nutritional status  3/22/2025 1348 by Waldemar Prather RN  Outcome: Progressing  3/22/2025 0438 by Gretchen Arcos RN  Outcome: Progressing

## 2025-03-22 NOTE — PROGRESS NOTES
GENERAL SURGERY  DAILY PROGRESS NOTE  3/22/2025    Chief Complaint   Patient presents with    Constipation     X3 days    Shortness of Breath    Nasal Congestion    Abdominal Pain     Mid lower abd pain        ATTENDING PHYSICIAN PROGRESS NOTE      I have examined the patient and performed the key aspects of physical exam.  I have independently reviewed the record including all pertinent and new radiology images and laboratory findings as well as reviewed all pertinent provider documentation), and discussed the case with the surgical team.  I agree with the assessment and plan with the following additions, corrections, and changes. 14pt review of symptoms completed and negative except as mentioned.      Subjective:  NGT placed yesterday w dark output >2L  Says he feels a bit better today  Making good urine    Objective:  /79   Pulse 96   Temp 98.6 °F (37 °C) (Oral)   Resp 20   Ht 1.778 m (5' 10\")   Wt 103.9 kg (229 lb 0.9 oz)   SpO2 92%   BMI 32.87 kg/m²     Gen: aox3, nad  Cvs: rrr  Lungs: non labored  Abd: soft, moderately distended, appropriately tender, ostomy pink and viable, bowel sweat in ostomy bag, GARRETT w 175 ss output      Assessment/Plan:  64 y.o. male w colovesical fistula s/p exlap with takedown colovesicular & SB fistula, R ricky, Nona's, drainage of intra-abd abscess 3/19     Maintain raphael 2wks  Post op ileus expected given nature of his surgery  NGT to LIWS  Continue NPO w sips and ice chips for now  Prn pain/nausea control  IV abx; appreciate ID recs  Encourage OOB, ambulation    KARINA Vazquez MD FACS  Minimally Invasive General Surgery and Endoscopy  [facility]  [address], [address]  [facility] [address]  O: [phone]  F: [phone]      Electronically signed by Garett Vazquez MD on 3/22/2025 at 10:42 AM

## 2025-03-22 NOTE — PROGRESS NOTES
Internal Medicine Progress Note    Patient's name: Yevgeniy Johnson  : 1960  Chief complaints (on day of admission): Constipation (X3 days), Shortness of Breath, Nasal Congestion, and Abdominal Pain (Mid lower abd pain )  Admission date: 3/15/2025  Date of service: 3/22/2025   Room: [address] MED SURG/TELE  Primary care physician: Tanvir Levy MD  Reason for visit: Follow-up for colovesical fistula/diverticular abscess    Subjective  Yevgeniy seen and evaluated sitting up in bed.  He is awake alert and oriented in no acute distress.  Appears mildly uncomfortable with the NG tube.  States he feels like it is bothering his throat where it is in the throat.  States he does still have some abdominal pain but feels slightly improved and less bloating. His potassium this a.m. is low at 3.1., IV replacement ordered.  Per nursing, he was with SpO2 of 89% on 2 L and he is now on 3 L nasal cannula.  Will recheck CXR.  No issues reported otherwise from nursing        Review of Systems  Full 10 point review of systems negative unless mentioned above.    Hospital Medications  Current Facility-Administered Medications   Medication Dose Route Frequency Provider Last Rate Last Admin    enoxaparin Sodium (LOVENOX) injection 30 mg  30 mg SubCUTAneous BID César Noel MD   30 mg at 25 0826    potassium chloride 10 mEq/100 mL IVPB (Peripheral Line)  10 mEq IntraVENous Q1H Ashwini Nuñez APRN - CNP        naloxone 0.4 mg in 10 mL sodium chloride syringe   IntraVENous PRN César Noel MD        polyethylene glycol (GLYCOLAX) packet 17 g  17 g Oral Daily PRN César Noel MD        HYDROmorphone (DILAUDID) 1 mg/mL PCA   IntraVENous Continuous César Noel MD   New Bag at 25    calcium carbonate (TUMS) chewable tablet 500 mg  500 mg Oral 4x Daily PRN Tyrell Palm DO   500 mg at 25    0.9 % sodium chloride infusion   IntraVENous PRN César Noel MD         lb 0.9 oz)   SpO2 92%   BMI 32.87 kg/m²   I/O last 3 completed shifts:  In: 12.8 [I.V.:12.8]  Out: 5280 [Urine:2100; Emesis/NG output:2950; Drains:225; Stool:5]  No intake/output data recorded.    Physical Exam:  General: awake, oriented, seems tired and somewhat uncomfortable, in no acute distress  Eyes: conjunctivae/corneas clear, sclera non icteric  Skin: warm and dry, no rashes or lesions noted  Lungs: clear but diminished to bilateral bases, respirations even and non-labored on 3L NC  Heart: regular rate, regular rhythm, S1S2 present, no murmur noted  Abdomen: softly distended, mild generalized tenderness as expected, midline incision noted with dressing in place, hypoactive bowel sounds, GARRETT noted, LLQ ostomy noted with no stool/gas, raphael intact draining clear yellow urine. NG to LIS  Extremities: generalized weakness, no edema  Neurologic: following commands, speech is clear    Most Recent Labs  Lab Results   Component Value Date    WBC 13.7 (H) 03/22/2025    HGB 10.4 (L) 03/22/2025    HCT 33.0 (L) 03/22/2025     03/22/2025     03/22/2025    K 3.1 (L) 03/22/2025     03/22/2025    CREATININE 0.6 (L) 03/22/2025    BUN 14 03/22/2025    CO2 30 (H) 03/22/2025    GLUCOSE 92 03/22/2025    ALT 13 03/22/2025    AST 14 03/22/2025    TSH 1.56 03/18/2025    LABA1C 5.7 (H) 03/18/2025       XR CHEST ABDOMEN NG PLACEMENT   Final Result   Enteric tube tip in the mid gastric body.         XR ABDOMEN (KUB) (SINGLE AP VIEW)   Final Result   1. Multiple mildly distended segments of small bowel are present which may   indicate adynamic ileus.   2. Stool and gas is seen in the colon.   3. No findings to suggest pneumoperitoneum.         CT ABDOMEN PELVIS W IV CONTRAST Additional Contrast? None   Final Result   1. There is a persistent extraluminal gas and fluid collection in the pre   vesicle space concerning for an abscess. This has increased in size when   compared the previous study.   2. There is a

## 2025-03-22 NOTE — PROGRESS NOTES
DVT Prophylaxis Adjustment Policy (DVT Prophylaxis)     This patient is on DVT Prophylaxis medication that requires a dose adjustment      Date Body Weight IBW  Adjusted BW SCr  CrCl Dialysis status   3/22/2025 103.9 kg (229 lb 0.9 oz) Ideal body weight: 73 kg (160 lb 15 oz)  Adjusted ideal body weight: 85.4 kg (188 lb 3 oz) Serum creatinine: 0.6 mg/dL (L) 03/22/25 0447  Estimated creatinine clearance: 148 mL/min (A) N/a       Pharmacy has dose-adjusted the DVT Prophylaxis regimen to match   the recommendations from the following table        Ordered Medication:Lovenox 40mg daily    Order Changed/converted to: Lovenox 30mg BID      These changes were made per protocol according to the [facility] Pharmacist   Review for Appropriate Use and Automatic Dose Adjustments of   Subcutaneous Anticoagulants Policy     *Please note this dose may need readjusted if patient's condition changes.    Please contact pharmacy with any questions regarding these changes.    freddie candelaria RPH  3/22/2025  6:41 AM

## 2025-03-23 LAB
ALBUMIN SERPL-MCNC: 2.7 G/DL (ref 3.5–5.2)
ALP SERPL-CCNC: 58 U/L (ref 40–129)
ALT SERPL-CCNC: 11 U/L (ref 0–40)
ANION GAP SERPL CALCULATED.3IONS-SCNC: 11 MMOL/L (ref 7–16)
AST SERPL-CCNC: 13 U/L (ref 0–39)
BASOPHILS # BLD: 0.03 K/UL (ref 0–0.2)
BASOPHILS NFR BLD: 0 % (ref 0–2)
BILIRUB SERPL-MCNC: 0.3 MG/DL (ref 0–1.2)
BUN SERPL-MCNC: 16 MG/DL (ref 6–23)
CALCIUM SERPL-MCNC: 8.5 MG/DL (ref 8.6–10.2)
CHLORIDE SERPL-SCNC: 102 MMOL/L (ref 98–107)
CO2 SERPL-SCNC: 29 MMOL/L (ref 22–29)
CREAT SERPL-MCNC: 0.6 MG/DL (ref 0.7–1.2)
EOSINOPHIL # BLD: 0.17 K/UL (ref 0.05–0.5)
EOSINOPHILS RELATIVE PERCENT: 2 % (ref 0–6)
ERYTHROCYTE [DISTWIDTH] IN BLOOD BY AUTOMATED COUNT: 13.2 % (ref 11.5–15)
GFR, ESTIMATED: >90 ML/MIN/1.73M2
GLUCOSE SERPL-MCNC: 88 MG/DL (ref 74–99)
HCT VFR BLD AUTO: 30.8 % (ref 37–54)
HGB BLD-MCNC: 9.9 G/DL (ref 12.5–16.5)
IMM GRANULOCYTES # BLD AUTO: 0.04 K/UL (ref 0–0.58)
IMM GRANULOCYTES NFR BLD: 0 % (ref 0–5)
LYMPHOCYTES NFR BLD: 1.08 K/UL (ref 1.5–4)
LYMPHOCYTES RELATIVE PERCENT: 11 % (ref 20–42)
MAGNESIUM SERPL-MCNC: 2.1 MG/DL (ref 1.6–2.6)
MCH RBC QN AUTO: 29.5 PG (ref 26–35)
MCHC RBC AUTO-ENTMCNC: 32.1 G/DL (ref 32–34.5)
MCV RBC AUTO: 91.7 FL (ref 80–99.9)
MONOCYTES NFR BLD: 0.8 K/UL (ref 0.1–0.95)
MONOCYTES NFR BLD: 8 % (ref 2–12)
NEUTROPHILS NFR BLD: 78 % (ref 43–80)
NEUTS SEG NFR BLD: 7.59 K/UL (ref 1.8–7.3)
PLATELET # BLD AUTO: 331 K/UL (ref 130–450)
PMV BLD AUTO: 8.8 FL (ref 7–12)
POTASSIUM SERPL-SCNC: 3.1 MMOL/L (ref 3.5–5)
PROT SERPL-MCNC: 5.5 G/DL (ref 6.4–8.3)
RBC # BLD AUTO: 3.36 M/UL (ref 3.8–5.8)
SODIUM SERPL-SCNC: 142 MMOL/L (ref 132–146)
WBC OTHER # BLD: 9.7 K/UL (ref 4.5–11.5)

## 2025-03-23 PROCEDURE — 6360000002 HC RX W HCPCS: Performed by: STUDENT IN AN ORGANIZED HEALTH CARE EDUCATION/TRAINING PROGRAM

## 2025-03-23 PROCEDURE — 2500000003 HC RX 250 WO HCPCS: Performed by: STUDENT IN AN ORGANIZED HEALTH CARE EDUCATION/TRAINING PROGRAM

## 2025-03-23 PROCEDURE — 6370000000 HC RX 637 (ALT 250 FOR IP): Performed by: STUDENT IN AN ORGANIZED HEALTH CARE EDUCATION/TRAINING PROGRAM

## 2025-03-23 PROCEDURE — 1200000000 HC SEMI PRIVATE

## 2025-03-23 PROCEDURE — 2580000003 HC RX 258: Performed by: STUDENT IN AN ORGANIZED HEALTH CARE EDUCATION/TRAINING PROGRAM

## 2025-03-23 PROCEDURE — 83735 ASSAY OF MAGNESIUM: CPT

## 2025-03-23 PROCEDURE — 2700000000 HC OXYGEN THERAPY PER DAY

## 2025-03-23 PROCEDURE — 85025 COMPLETE CBC W/AUTO DIFF WBC: CPT

## 2025-03-23 PROCEDURE — 80053 COMPREHEN METABOLIC PANEL: CPT

## 2025-03-23 RX ADMIN — PIPERACILLIN AND TAZOBACTAM 3375 MG: 3; .375 INJECTION, POWDER, LYOPHILIZED, FOR SOLUTION INTRAVENOUS at 12:11

## 2025-03-23 RX ADMIN — SODIUM CHLORIDE, POTASSIUM CHLORIDE, SODIUM LACTATE AND CALCIUM CHLORIDE: 600; 310; 30; 20 INJECTION, SOLUTION INTRAVENOUS at 09:55

## 2025-03-23 RX ADMIN — BUPRENORPHINE HYDROCHLORIDE AND NALOXONE HYDROCHLORIDE DIHYDRATE 1 TABLET: 8; 2 TABLET SUBLINGUAL at 20:17

## 2025-03-23 RX ADMIN — POTASSIUM CHLORIDE 10 MEQ: 7.46 INJECTION, SOLUTION INTRAVENOUS at 07:14

## 2025-03-23 RX ADMIN — SODIUM CHLORIDE, PRESERVATIVE FREE 10 ML: 5 INJECTION INTRAVENOUS at 20:17

## 2025-03-23 RX ADMIN — Medication: at 21:48

## 2025-03-23 RX ADMIN — SODIUM CHLORIDE: 9 INJECTION, SOLUTION INTRAVENOUS at 05:50

## 2025-03-23 RX ADMIN — PIPERACILLIN AND TAZOBACTAM 3375 MG: 3; .375 INJECTION, POWDER, LYOPHILIZED, FOR SOLUTION INTRAVENOUS at 04:18

## 2025-03-23 RX ADMIN — IPRATROPIUM BROMIDE 0.5 MG: 0.5 SOLUTION RESPIRATORY (INHALATION) at 20:52

## 2025-03-23 RX ADMIN — ENOXAPARIN SODIUM 30 MG: 100 INJECTION SUBCUTANEOUS at 08:31

## 2025-03-23 RX ADMIN — PIPERACILLIN AND TAZOBACTAM 3375 MG: 3; .375 INJECTION, POWDER, LYOPHILIZED, FOR SOLUTION INTRAVENOUS at 20:17

## 2025-03-23 RX ADMIN — HYDROXYZINE PAMOATE 25 MG: 25 CAPSULE ORAL at 20:23

## 2025-03-23 RX ADMIN — SODIUM CHLORIDE, PRESERVATIVE FREE 10 ML: 5 INJECTION INTRAVENOUS at 12:27

## 2025-03-23 RX ADMIN — ARFORMOTEROL TARTRATE 15 MCG: 15 SOLUTION RESPIRATORY (INHALATION) at 20:52

## 2025-03-23 RX ADMIN — POTASSIUM CHLORIDE 10 MEQ: 7.46 INJECTION, SOLUTION INTRAVENOUS at 05:43

## 2025-03-23 RX ADMIN — ENOXAPARIN SODIUM 30 MG: 100 INJECTION SUBCUTANEOUS at 20:17

## 2025-03-23 RX ADMIN — SODIUM CHLORIDE, POTASSIUM CHLORIDE, SODIUM LACTATE AND CALCIUM CHLORIDE: 600; 310; 30; 20 INJECTION, SOLUTION INTRAVENOUS at 18:42

## 2025-03-23 RX ADMIN — BUPRENORPHINE HYDROCHLORIDE AND NALOXONE HYDROCHLORIDE DIHYDRATE 2 TABLET: 8; 2 TABLET SUBLINGUAL at 08:31

## 2025-03-23 RX ADMIN — SODIUM CHLORIDE, PRESERVATIVE FREE 10 ML: 5 INJECTION INTRAVENOUS at 04:19

## 2025-03-23 RX ADMIN — OXYCODONE HYDROCHLORIDE 10 MG: 5 TABLET ORAL at 15:17

## 2025-03-23 RX ADMIN — SODIUM CHLORIDE, PRESERVATIVE FREE 10 ML: 5 INJECTION INTRAVENOUS at 23:38

## 2025-03-23 RX ADMIN — POTASSIUM CHLORIDE 10 MEQ: 7.46 INJECTION, SOLUTION INTRAVENOUS at 09:57

## 2025-03-23 RX ADMIN — POTASSIUM CHLORIDE 10 MEQ: 7.46 INJECTION, SOLUTION INTRAVENOUS at 08:29

## 2025-03-23 RX ADMIN — SODIUM CHLORIDE, PRESERVATIVE FREE 10 ML: 5 INJECTION INTRAVENOUS at 08:32

## 2025-03-23 ASSESSMENT — PAIN DESCRIPTION - LOCATION
LOCATION: ABDOMEN;BACK
LOCATION: ABDOMEN

## 2025-03-23 ASSESSMENT — PAIN SCALES - GENERAL
PAINLEVEL_OUTOF10: 8
PAINLEVEL_OUTOF10: 8
PAINLEVEL_OUTOF10: 5

## 2025-03-23 ASSESSMENT — PAIN DESCRIPTION - ORIENTATION: ORIENTATION: LOWER

## 2025-03-23 ASSESSMENT — PAIN DESCRIPTION - DESCRIPTORS: DESCRIPTORS: ACHING;SHARP

## 2025-03-23 NOTE — PLAN OF CARE
Problem: Discharge Planning  Goal: Discharge to home or other facility with appropriate resources  3/23/2025 1033 by Waldemar Prather RN  Outcome: Progressing  3/23/2025 0417 by Mela Capps, RN  Outcome: Progressing     Problem: Pain  Goal: Verbalizes/displays adequate comfort level or baseline comfort level  3/23/2025 1033 by Waldemar Prather RN  Outcome: Progressing  3/23/2025 0417 by Mela Capps, RN  Outcome: Progressing     Problem: ABCDS Injury Assessment  Goal: Absence of physical injury  3/23/2025 1033 by Waldemar Prather RN  Outcome: Progressing  3/23/2025 0417 by Mela Capps, RN  Outcome: Progressing     Problem: Safety - Adult  Goal: Free from fall injury  3/23/2025 1033 by Waldemar Prather RN  Outcome: Progressing  3/23/2025 0417 by Mela Capps, RN  Outcome: Progressing     Problem: Nutrition Deficit:  Goal: Optimize nutritional status  3/23/2025 1033 by Waldemar Prather RN  Outcome: Progressing  3/23/2025 0417 by Mela Capps, RN  Outcome: Progressing

## 2025-03-23 NOTE — PROGRESS NOTES
Internal Medicine Progress Note    Patient's name: Yevgeniy Johnson  : 1960  Chief complaints (on day of admission): Constipation (X3 days), Shortness of Breath, Nasal Congestion, and Abdominal Pain (Mid lower abd pain )  Admission date: 3/15/2025  Date of service: 3/23/2025   Room: [address] MED SURG/TELE  Primary care physician: Tanvir Levy MD  Reason for visit: Follow-up for colovesical fistula/diverticular abscess    Subjective  Yevgenyi seen & evaluated sitting up in bed.  He is awake alert and oriented.  He is in no acute distress.  States he is feeling improved with less abdominal bloating today.  Remains with NG tube to LIS and he had 700 mL dark output overnight per nursing. K+ is 3.1 again today with replacement actively infusing.  Remains on 2L NC. CXR yesterday revealing no acute process.  He has no new complaints.  Denies any nausea, fever, chills, back or flank pain, chest pain, shortness of breath.  Making ample urine output.  No issues reported otherwise from nursing    Review of Systems  Full 10 point review of systems negative unless mentioned above.    Hospital Medications  Current Facility-Administered Medications   Medication Dose Route Frequency Provider Last Rate Last Admin    enoxaparin Sodium (LOVENOX) injection 30 mg  30 mg SubCUTAneous BID César Noel MD   30 mg at 25 0831    naloxone 0.4 mg in 10 mL sodium chloride syringe   IntraVENous PRN César Noel MD        polyethylene glycol (GLYCOLAX) packet 17 g  17 g Oral Daily PRN César Noel MD        HYDROmorphone (DILAUDID) 1 mg/mL PCA   IntraVENous Continuous César Noel MD   New Syringe/Cartridge at 25    calcium carbonate (TUMS) chewable tablet 500 mg  500 mg Oral 4x Daily PRN Tyrell Palm DO   500 mg at 25    0.9 % sodium chloride infusion   IntraVENous PRN César Noel MD 50 mL/hr at 25 0550 New Bag at 25 0550    piperacillin-tazobactam (ZOSYN)  for an abscess. This has increased in size when   compared the previous study.   2. There is a colovesical fistula that appears to communicate with the   sigmoid lumen.   3. There is diffuse mucosal thickening of the bladder concerning for cystitis.   4. Cholelithiasis.   5. Mild central duct dilation.   6. Diverticulosis.   7. Small fixed hiatus hernia.   8. There are ground-glass opacities within the right middle lobe with   bronchiectatic change and tree-in-bud opacities suggests inflammatory change.   This is not significantly improved when compared the previous study.         XR CHEST (2 VW)   Final Result   Mild increased markings present. No acute cardiopulmonary process.             Assessment   Active Hospital Problems    Diagnosis     COPD (chronic obstructive pulmonary disease) ([facility]) [J44.9]     Pelvic abscess in male ([facility]) [K65.1]     Hypertension [I10]          Plan  Colovesical Fistula with Diverticular Abscess  CT abdomen/pelvis noted -- originally scheduled for OR next month  S/p takedown of colovesicular fistula and SB fistula with right hemicolectomy, Kang's and drainage of intra-abdominal abscess 3/19  Maintain Jorge catheter x 2 weeks per Surgery  Diet downgraded to NPO   Pain control with PCA pump, Oxy IR as needed -- adjustments per Surgery  Continue IV Zosyn as per ID  Encourage ambulation and increased activity  General Surgery and ID consults appreciated    Nausea and Vomiting --  Post-operative Ileus  KUB 3/21 noted  NG placed 3/21  Continue as needed antiemetics -- consider trial of Reglan however defer to Surgery  General Surgery consult appreciated    ?COVID-19; ruled out  Rapid COVID indeterminate   Full viral respiratory panel negative   No indication for Remdesivir, Baricitinib or Decadron    BPH with Urinary Retention  Holding Flomax due to NPO with concerns for ileus  Jorge is to remain in place x 2 weeks post-operatively per Surgery    Generalized Anxiety Disorder  Holding

## 2025-03-23 NOTE — PROGRESS NOTES
[facility]  [facility]  Progress Note    SUBJECTIVE:  Chief Complaint   Patient presents with    Constipation     X3 days    Shortness of Breath    Nasal Congestion    Abdominal Pain     Mid lower abd pain      Patient resting in bed.  No distress.  Seems to be tolerating medications.  NG to suction.  No complaints he said he is just trying to get by.    Review of systems:  As stated above in the chief complaint, otherwise negative.    Medications:  Scheduled Meds:   enoxaparin  30 mg SubCUTAneous BID    piperacillin-tazobactam  3,375 mg IntraVENous Q8H    And    sodium chloride (PF)  10 mL IntraVENous Q6H    acetaminophen  650 mg Oral 4 times per day    [Held by provider] methocarbamol  1,000 mg Oral 4x Daily    buprenorphine-naloxone  2 tablet SubLINGual Daily    buprenorphine-naloxone  1 tablet SubLINGual QHS    nicotine  1 patch TransDERmal Daily    [Held by provider] mirtazapine  30 mg Oral Nightly    [Held by provider] tamsulosin  0.4 mg Oral Daily    sodium chloride flush  10 mL IntraVENous 2 times per day    arformoterol tartrate  15 mcg Nebulization BID RT    And    ipratropium  0.5 mg Nebulization BID     Continuous Infusions:   HYDROmorphone      sodium chloride 50 mL/hr at 25 0550    lactated ringers 125 mL/hr at 25 0955    sodium chloride       PRN Meds:naloxone 0.4 mg in 10 mL sodium chloride syringe, polyethylene glycol, calcium carbonate, sodium chloride, oxyCODONE **OR** oxyCODONE, hydrOXYzine pamoate, traZODone, albuterol, sodium chloride flush, sodium chloride, potassium chloride **OR** potassium alternative oral replacement **OR** potassium chloride, magnesium sulfate, ondansetron **OR** ondansetron, melatonin, fluticasone    OBJECTIVE:  BP (!) 145/84   Pulse 91   Temp 98.8 °F (37.1 °C) (Oral)   Resp 19   Ht 1.778 m (5' 10\")   Wt 104.8 kg (231 lb 1.6 oz)   SpO2 94%   BMI 33.16 kg/m²   Temp  Av.5 °F (36.9 °C)  Min: 98.1 °F (36.7 °C)  Max:  98.8 °F (37.1 °C)  Constitutional:  resting in bed awake alert.  No distress.  Skin: Warm and dry. No rashes were noted.   HEENT: Round and reactive pupils.  Moist mucous membranes.  No ulcerations or thrush.  NG tube in place  Neck: Supple to movements.   Chest: No use of accessory muscles to breathe. Symmetrical expansion.  Clear throughout  Nasal cannula oxygen  Cardiovascular: S1 and S2 are rhythmic and regular. No murmurs appreciated.   Abdomen: bowel sounds to auscultation.  Right GARRETT drain with bloody drainage.  Ostomy with bowel sweat.  Dry dressing to the mid abdomen.  Softly distended otherwise.  NG to low intermittent suction.  Dark material in the tubing and canister  Extremities: No edema.  Lines: Peripheral.  Jorge catheter with tyson colored urine. No longer feculent     Laboratory and Tests:  Reviewed    Radiology:  Reviewed    Microbiology:   Rapid COVID 3/15: Indeterminate  RVP 3/16: Negative  Blood cultures 3/15: Negative so far  Urine culture 3/15: Mixed armani, ox positive    ASSESSMENT:  Diverticular abscess  Colovesical fistula  Bacteriuria associated to colovesical fistula  Constipation  S/p exlap with takedown colovesicular & SB fistula, R ricky, Nona's, drainage of intra-abd abscess 3/19   Postop ileus  Leukocytosis, likely reactive postop, pain, and ileus--- improving from 3/22 to 3/23    PLAN:  Continue IV Zosyn 3.375 g every 6 hours for now  Monitor labs  cxs  We will continue to follow    STUART Gage  11:36 AM  3/23/2025

## 2025-03-23 NOTE — PROGRESS NOTES
GENERAL SURGERY  DAILY PROGRESS NOTE  3/23/2025    Chief Complaint   Patient presents with    Constipation     X3 days    Shortness of Breath    Nasal Congestion    Abdominal Pain     Mid lower abd pain        ATTENDING PHYSICIAN PROGRESS NOTE      I have examined the patient and performed the key aspects of physical exam.  I have independently reviewed the record including all pertinent and new radiology images and laboratory findings as well as reviewed all pertinent provider documentation), and discussed the case with the surgical team.  I agree with the assessment and plan with the following additions, corrections, and changes. 14pt review of symptoms completed and negative except as mentioned.      Subjective:  NGT w 1500 overnight  Tape had come off and ngt was loose  Ostomy with stool in the bag  GARRETT ss    Objective:  BP (!) 145/84   Pulse 91   Temp 98.8 °F (37.1 °C) (Oral)   Resp 19   Ht 1.778 m (5' 10\")   Wt 104.8 kg (231 lb 1.6 oz)   SpO2 94%   BMI 33.16 kg/m²     Gen: aox3, nad  Cvs: rrr  Lungs: non labored  Abd: soft, moderately distended, appropriately tender, ostomy pink and viable, brown stool in bag, GARRETT w 40 ss output      Assessment/Plan:  64 y.o. male w colovesical fistula s/p exlap with takedown colovesicular & SB fistula, R ricky, Nona's, drainage of intra-abd abscess 3/19     Maintain raphael 2wks  NGT to LIWS  Ok for ice chips but ngt output is still too high to clamp trial today  Prn pain/nausea control  IV abx; appreciate ID recs  Wbc down to normal  Encourage OOB, ambulation    KARINA Vazquez MD FACS  Minimally Invasive General Surgery and Endoscopy  [facility]  [address], [address]  [place] [address]  O: [phone]  F: [phone]      Electronically signed by Garett Vazquez MD on 3/23/2025 at 9:51 AM

## 2025-03-24 LAB
ALBUMIN SERPL-MCNC: 2.9 G/DL (ref 3.5–5.2)
ALP SERPL-CCNC: 77 U/L (ref 40–129)
ALT SERPL-CCNC: 15 U/L (ref 0–40)
ANION GAP SERPL CALCULATED.3IONS-SCNC: 14 MMOL/L (ref 7–16)
AST SERPL-CCNC: 16 U/L (ref 0–39)
BASOPHILS # BLD: 0.04 K/UL (ref 0–0.2)
BASOPHILS NFR BLD: 0 % (ref 0–2)
BILIRUB SERPL-MCNC: 0.5 MG/DL (ref 0–1.2)
BUN SERPL-MCNC: 13 MG/DL (ref 6–23)
CALCIUM SERPL-MCNC: 8.3 MG/DL (ref 8.6–10.2)
CHLORIDE SERPL-SCNC: 103 MMOL/L (ref 98–107)
CO2 SERPL-SCNC: 25 MMOL/L (ref 22–29)
CREAT SERPL-MCNC: 0.5 MG/DL (ref 0.7–1.2)
EOSINOPHIL # BLD: 0.21 K/UL (ref 0.05–0.5)
EOSINOPHILS RELATIVE PERCENT: 2 % (ref 0–6)
ERYTHROCYTE [DISTWIDTH] IN BLOOD BY AUTOMATED COUNT: 13 % (ref 11.5–15)
GFR, ESTIMATED: >90 ML/MIN/1.73M2
GLUCOSE SERPL-MCNC: 87 MG/DL (ref 74–99)
HCT VFR BLD AUTO: 31.9 % (ref 37–54)
HGB BLD-MCNC: 10.1 G/DL (ref 12.5–16.5)
IMM GRANULOCYTES # BLD AUTO: 0.06 K/UL (ref 0–0.58)
IMM GRANULOCYTES NFR BLD: 1 % (ref 0–5)
LYMPHOCYTES NFR BLD: 0.99 K/UL (ref 1.5–4)
LYMPHOCYTES RELATIVE PERCENT: 9 % (ref 20–42)
MAGNESIUM SERPL-MCNC: 2.1 MG/DL (ref 1.6–2.6)
MCH RBC QN AUTO: 29 PG (ref 26–35)
MCHC RBC AUTO-ENTMCNC: 31.7 G/DL (ref 32–34.5)
MCV RBC AUTO: 91.7 FL (ref 80–99.9)
MONOCYTES NFR BLD: 0.85 K/UL (ref 0.1–0.95)
MONOCYTES NFR BLD: 8 % (ref 2–12)
NEUTROPHILS NFR BLD: 80 % (ref 43–80)
NEUTS SEG NFR BLD: 8.81 K/UL (ref 1.8–7.3)
PLATELET # BLD AUTO: 365 K/UL (ref 130–450)
PMV BLD AUTO: 9 FL (ref 7–12)
POTASSIUM SERPL-SCNC: 3.3 MMOL/L (ref 3.5–5)
PROT SERPL-MCNC: 5.9 G/DL (ref 6.4–8.3)
RBC # BLD AUTO: 3.48 M/UL (ref 3.8–5.8)
SODIUM SERPL-SCNC: 142 MMOL/L (ref 132–146)
WBC OTHER # BLD: 11 K/UL (ref 4.5–11.5)

## 2025-03-24 PROCEDURE — 6360000002 HC RX W HCPCS: Performed by: STUDENT IN AN ORGANIZED HEALTH CARE EDUCATION/TRAINING PROGRAM

## 2025-03-24 PROCEDURE — 2700000000 HC OXYGEN THERAPY PER DAY

## 2025-03-24 PROCEDURE — 6370000000 HC RX 637 (ALT 250 FOR IP): Performed by: STUDENT IN AN ORGANIZED HEALTH CARE EDUCATION/TRAINING PROGRAM

## 2025-03-24 PROCEDURE — 85025 COMPLETE CBC W/AUTO DIFF WBC: CPT

## 2025-03-24 PROCEDURE — 94640 AIRWAY INHALATION TREATMENT: CPT

## 2025-03-24 PROCEDURE — 97535 SELF CARE MNGMENT TRAINING: CPT

## 2025-03-24 PROCEDURE — 36415 COLL VENOUS BLD VENIPUNCTURE: CPT

## 2025-03-24 PROCEDURE — 97530 THERAPEUTIC ACTIVITIES: CPT

## 2025-03-24 PROCEDURE — 83735 ASSAY OF MAGNESIUM: CPT

## 2025-03-24 PROCEDURE — 97161 PT EVAL LOW COMPLEX 20 MIN: CPT

## 2025-03-24 PROCEDURE — 1200000000 HC SEMI PRIVATE

## 2025-03-24 PROCEDURE — 2500000003 HC RX 250 WO HCPCS: Performed by: STUDENT IN AN ORGANIZED HEALTH CARE EDUCATION/TRAINING PROGRAM

## 2025-03-24 PROCEDURE — 80053 COMPREHEN METABOLIC PANEL: CPT

## 2025-03-24 PROCEDURE — 2580000003 HC RX 258: Performed by: STUDENT IN AN ORGANIZED HEALTH CARE EDUCATION/TRAINING PROGRAM

## 2025-03-24 RX ORDER — SODIUM CHLORIDE 9 MG/ML
10 INJECTION, SOLUTION INTRAMUSCULAR; INTRAVENOUS; SUBCUTANEOUS EVERY 8 HOURS
Status: DISCONTINUED | OUTPATIENT
Start: 2025-03-24 | End: 2025-03-24

## 2025-03-24 RX ORDER — HYDROMORPHONE HYDROCHLORIDE 2 MG/1
1 TABLET ORAL
Refills: 0 | Status: DISCONTINUED | OUTPATIENT
Start: 2025-03-24 | End: 2025-03-24

## 2025-03-24 RX ORDER — PIPERACILLIN SODIUM, TAZOBACTAM SODIUM 3; .375 G/15ML; G/15ML
3375 INJECTION, POWDER, LYOPHILIZED, FOR SOLUTION INTRAVENOUS EVERY 8 HOURS
Status: DISCONTINUED | OUTPATIENT
Start: 2025-03-24 | End: 2025-03-24

## 2025-03-24 RX ORDER — KETOROLAC TROMETHAMINE 15 MG/ML
15 INJECTION, SOLUTION INTRAMUSCULAR; INTRAVENOUS EVERY 6 HOURS PRN
Status: ACTIVE | OUTPATIENT
Start: 2025-03-24 | End: 2025-03-26

## 2025-03-24 RX ADMIN — SODIUM CHLORIDE, PRESERVATIVE FREE 10 ML: 5 INJECTION INTRAVENOUS at 09:16

## 2025-03-24 RX ADMIN — SODIUM CHLORIDE 10 ML: 9 INJECTION, SOLUTION INTRAMUSCULAR; INTRAVENOUS; SUBCUTANEOUS at 13:34

## 2025-03-24 RX ADMIN — OXYCODONE HYDROCHLORIDE 10 MG: 5 TABLET ORAL at 22:22

## 2025-03-24 RX ADMIN — ACETAMINOPHEN 650 MG: 325 TABLET ORAL at 10:43

## 2025-03-24 RX ADMIN — ONDANSETRON 4 MG: 2 INJECTION INTRAMUSCULAR; INTRAVENOUS at 16:24

## 2025-03-24 RX ADMIN — HYDROXYZINE PAMOATE 25 MG: 25 CAPSULE ORAL at 22:22

## 2025-03-24 RX ADMIN — ONDANSETRON 4 MG: 2 INJECTION INTRAMUSCULAR; INTRAVENOUS at 09:09

## 2025-03-24 RX ADMIN — Medication 3 MG: at 22:21

## 2025-03-24 RX ADMIN — SODIUM CHLORIDE, PRESERVATIVE FREE 10 ML: 5 INJECTION INTRAVENOUS at 21:14

## 2025-03-24 RX ADMIN — BUPRENORPHINE HYDROCHLORIDE AND NALOXONE HYDROCHLORIDE DIHYDRATE 1 TABLET: 8; 2 TABLET SUBLINGUAL at 21:14

## 2025-03-24 RX ADMIN — ENOXAPARIN SODIUM 30 MG: 100 INJECTION SUBCUTANEOUS at 21:14

## 2025-03-24 RX ADMIN — IPRATROPIUM BROMIDE 0.5 MG: 0.5 SOLUTION RESPIRATORY (INHALATION) at 07:52

## 2025-03-24 RX ADMIN — POTASSIUM BICARBONATE 40 MEQ: 782 TABLET, EFFERVESCENT ORAL at 10:43

## 2025-03-24 RX ADMIN — ARFORMOTEROL TARTRATE 15 MCG: 15 SOLUTION RESPIRATORY (INHALATION) at 07:52

## 2025-03-24 RX ADMIN — BUPRENORPHINE HYDROCHLORIDE AND NALOXONE HYDROCHLORIDE DIHYDRATE 2 TABLET: 8; 2 TABLET SUBLINGUAL at 09:09

## 2025-03-24 RX ADMIN — ENOXAPARIN SODIUM 30 MG: 100 INJECTION SUBCUTANEOUS at 09:15

## 2025-03-24 RX ADMIN — PIPERACILLIN AND TAZOBACTAM 3375 MG: 3; .375 INJECTION, POWDER, LYOPHILIZED, FOR SOLUTION INTRAVENOUS at 04:59

## 2025-03-24 RX ADMIN — PIPERACILLIN AND TAZOBACTAM 3375 MG: 3; .375 INJECTION, POWDER, LYOPHILIZED, FOR SOLUTION INTRAVENOUS at 13:34

## 2025-03-24 RX ADMIN — OXYCODONE HYDROCHLORIDE 10 MG: 5 TABLET ORAL at 16:24

## 2025-03-24 ASSESSMENT — PAIN DESCRIPTION - LOCATION
LOCATION: BACK
LOCATION: ABDOMEN

## 2025-03-24 ASSESSMENT — PAIN SCALES - GENERAL
PAINLEVEL_OUTOF10: 9
PAINLEVEL_OUTOF10: 9

## 2025-03-24 ASSESSMENT — PAIN DESCRIPTION - DESCRIPTORS: DESCRIPTORS: ACHING;DISCOMFORT;SORE

## 2025-03-24 NOTE — PLAN OF CARE
Problem: Pain  Goal: Verbalizes/displays adequate comfort level or baseline comfort level  3/24/2025 1457 by Karla Valenzuela RN  Outcome: Progressing  3/24/2025 0158 by Jessica Almanza RN  Outcome: Progressing     Problem: ABCDS Injury Assessment  Goal: Absence of physical injury  3/24/2025 1457 by Karla Valenzuela RN  Outcome: Progressing  3/24/2025 0158 by Jessiac Almanza RN  Outcome: Progressing     Problem: Safety - Adult  Goal: Free from fall injury  3/24/2025 0158 by Jessica Almanza RN  Outcome: Progressing

## 2025-03-24 NOTE — PROGRESS NOTES
[facility]  [facility]  Progress Note    SUBJECTIVE:  Chief Complaint   Patient presents with    Constipation     X3 days    Shortness of Breath    Nasal Congestion    Abdominal Pain     Mid lower abd pain      Patient is sitting up in the chair.  He is trying to eat a clear liquid tray  No fevers  Tolerating current antibiotics  Ostomy with gas and some stools    Review of systems:  As stated above in the chief complaint, otherwise negative.    Medications:  Scheduled Meds:   piperacillin-tazobactam  3,375 mg IntraVENous Q8H    And    sodium chloride (PF)  10 mL IntraVENous Q8H    enoxaparin  30 mg SubCUTAneous BID    acetaminophen  650 mg Oral 4 times per day    [Held by provider] methocarbamol  1,000 mg Oral 4x Daily    buprenorphine-naloxone  2 tablet SubLINGual Daily    buprenorphine-naloxone  1 tablet SubLINGual QHS    nicotine  1 patch TransDERmal Daily    [Held by provider] mirtazapine  30 mg Oral Nightly    [Held by provider] tamsulosin  0.4 mg Oral Daily    sodium chloride flush  10 mL IntraVENous 2 times per day    arformoterol tartrate  15 mcg Nebulization BID RT    And    ipratropium  0.5 mg Nebulization BID     Continuous Infusions:   HYDROmorphone      sodium chloride 50 mL/hr at 25 0550    lactated ringers 125 mL/hr at 25 1842    sodium chloride       PRN Meds:naloxone 0.4 mg in 10 mL sodium chloride syringe, polyethylene glycol, calcium carbonate, sodium chloride, oxyCODONE **OR** oxyCODONE, hydrOXYzine pamoate, traZODone, albuterol, sodium chloride flush, sodium chloride, potassium chloride **OR** potassium alternative oral replacement **OR** potassium chloride, magnesium sulfate, ondansetron **OR** ondansetron, melatonin, fluticasone    OBJECTIVE:  /72   Pulse 79   Temp 97.9 °F (36.6 °C) (Oral)   Resp 16   Ht 1.778 m (5' 10\")   Wt 104.9 kg (231 lb 4.2 oz)   SpO2 92%   BMI 33.18 kg/m²   Temp  Av.5 °F (36.9 °C)  Min: 97.9 °F (36.6 °C)   Max: 99 °F (37.2 °C)  Constitutional: Patient is sitting up in the chair.  He is in no distress.  Awake and alert  Skin: Warm and dry. No rashes were noted.   HEENT: Round and reactive pupils.  Moist mucous membranes.  No ulcerations or thrush.  NG tube in place -clamped at this time  Neck: Supple to movements.   Chest: No use of accessory muscles to breathe. Symmetrical expansion.  Clear throughout  Nasal cannula oxygen  Cardiovascular: S1 and S2 are rhythmic and regular. No murmurs appreciated.   Abdomen: bowel sounds to auscultation.  Right GARRETT drain with bloody drainage.  Ostomy with gas and some stools.  Dry dressing to the mid abdomen.  Softly distended otherwise.  Extremities: No edema.  Lines: Peripheral.  Jorge catheter with clear urine    Laboratory and Tests:  Reviewed    Radiology:  Reviewed    Microbiology:   Rapid COVID 3/15: Indeterminate  RVP 3/16: Negative  Blood cultures 3/15: Negative so far  Urine culture 3/15: Mixed armani, ox positive    ASSESSMENT:  Diverticular abscess  Colovesical fistula  Bacteriuria associated to colovesical fistula  Constipation  S/p exlap with takedown colovesicular & SB fistula, R ricky, Nona's, drainage of intra-abd abscess 3/19   Postop ileus  Leukocytosis, likely reactive postop, pain, and ileus, resolved    PLAN:  Stop Zosyn today  Labs and cultures reviewed  We will follow with you    STUART Delacruz - CNP  1:35 PM  3/24/2025    Patient seen and examined. I had a face to face encounter with the patient. Agree with exam.  Assessment and plan as outlined above and directed by me. Addition and corrections were done as deemed appropriate. My exam and plan include: The patient sitting up in a chair.  He is awake and in no distress.  He has completed course of antibiotics.  Zosyn can be discontinued.    Franky Sheppard MD  3/24/2025  3:11 PM

## 2025-03-24 NOTE — FLOWSHEET NOTE
Inpatient Wound Care (follow up) 545    Admit Date: 3/15/2025  1:52 PM    Reason for consult:  new colostomy, abdomen incision    Patient sitting up in chair, awake, alert and oriented. Assist of one person to stand.     Findings:     03/24/25 1009   Colostomy LLQ   Placement Date: 03/19/25   Present on Admission/Arrival: No  Location: LLQ   Stomal Appliance 1 piece;Flat;Leaking   Flange Size (inches)   (42mm)   Stoma  Assessment Red;Black;Moist;Protrudes   Peristomal Assessment Red;Edema   Mucocutaneous Junction Intact  (with sutures)   Treatment Barrier ring;Pouch change;Site care;Other (Comment)  (1pc flat pouch)   Stool Appearance Watery   Stool Color Brown   Stool Amount Small   Output (mL) 100 ml   Incision 03/19/25 Abdomen Medial;Mid   Date First Assessed/Time First Assessed: 03/19/25 1335   Present on Original Admission: No  Location: Abdomen  Incision Location Orientation: Medial;Mid   Wound Image    Dressing Status New dressing applied   Incision Cleansed Cleansed with saline   Dressing/Treatment ABD pad  (4x4 folded gauze)   Incision Length (cm) 21.5   Closure Staples  (with areas of incision left open)   Margins Other (Comment)  (areas of incision left open)   Incision Assessment Other (Comment)  (drainage)   Drainage Amount Small (< 25%)   Drainage Description Serosanguinous   Odor None   Stacy-incision Assessment Blanchable erythema   Closed/Suction Drain Right RLQ Bulb   Placement Date: 03/19/25   Present on Admission/Arrival: No  Description (optional): GARRETT DRAIN  Inserted by: MILOSEVIC  Tube Number: 1  Orientation: Right  Location: RLQ  Drain Tube Type: Bulb  Size : 15F  Tube Shape: Round  Drain Reservoir Size (ml): ...   Site Description Intact;Reddened   Dressing Status New dressing applied   Drainage Appearance Serosanguinous   Drain Status To bulb suction     Bilateral buttocks intact blanching.     **Informed Consent**    The patient has given verbal consent to have photos taken of wounds and  inserted into their chart as part of their permanent medical record for purposes of documentation, treatment management and/or medical review.   All Images taken on 3/24/25 of patient name: Yevgeniy Johnson were transmitted and stored on secured Epic  Site located within Media Folder Tab by a registered Epic-[employer] Mobile Application Device.     Patient acceptance for colostomy care lesson and pouch change. Demonstrated emptying bag, removal and application of pouch. Patient observed emptying bag, removal and application of pouch. Answered all questions. Written information reviewed of colostomy care, foods and there effect reference chart, dietary considerations, excessive gas causes and treatments, medications and ostomy chart, ostomy support group and how to contact inpatient wound care in folder and supplies left at bedside. Updated case management patient requesting to go to facility upon discharge due to having no one in area to help out.    Plan: 4x4/ABD to abdomen   Comfort glide  Wedges  Heel protectors  Chair waffle cushion  Dietary consult  Patient will need continued preventative care   Change pouch, continue colostomy care lesson, will follow.     Leidy Britton RN 3/24/2025 1:52 PM

## 2025-03-24 NOTE — PLAN OF CARE
Problem: Discharge Planning  Goal: Discharge to home or other facility with appropriate resources  Outcome: Progressing  Flowsheets (Taken 3/24/2025 0150)  Discharge to home or other facility with appropriate resources: Identify barriers to discharge with patient and caregiver     Problem: Pain  Goal: Verbalizes/displays adequate comfort level or baseline comfort level  Outcome: Progressing     Problem: ABCDS Injury Assessment  Goal: Absence of physical injury  Outcome: Progressing     Problem: Safety - Adult  Goal: Free from fall injury  Outcome: Progressing     Problem: Nutrition Deficit:  Goal: Optimize nutritional status  Outcome: Progressing

## 2025-03-24 NOTE — PROGRESS NOTES
Occupational Therapy  OT BEDSIDE TREATMENT NOTE      Date:3/24/2025  Patient Name: Yevgeniy Johnson  MRN: [id number]  : 1960  Room: [address] [place], [place] [address]         Evaluating OT: Alissa Arango OTR/L   [id number]       Referring Provider:César Noel MD     Specific Provider Orders/Date:OT eval and treat 3/202/2025       Diagnosis:  Shortness of breath [R06.02]  Colovesical fistula [N32.1]  Pelvic abscess in male ([facility]CC) [K65.1]   Procedure(s):  EXPLORATORY LAPAROTOMY  COMPLETE MOBILIZATION OF SPLENIC FLEXURE  TAKEDOWN OF COLOVESICULAR FISTULA  TAKEDOWN OF  ENTEROCOLONIC FISTULA  RIGHT HEMICOLECTOMY WITH PRIMARY STAPLED ANASTOMOSIS  SIGMOIDECTOMY WITH END COLOSTOMY (NISREEN'S)  DRAINAGE OF SUBCUTANEOUS, FASCIA, AND PELVIC ABCESS  CYSTOSCOPY WITH BILATERAL URETERAL CATHETER INSERTION    Pertinent Medical History: COPD, HTN, lumbar laminectomy      Precautions:  Fall Risk, GARRETT drain, colostomy, , PCA      Assessment of current deficits    [x] Functional mobility            []ADLs           [x] Strength                  []Cognition    [x] Functional transfers          [x] IADLs         [x] Safety Awareness   [x]Endurance    [] Fine Coordination                         [x] Balance      [] Vision/perception   []Sensation      []Gross Motor Coordination             [] ROM           [] Delirium                   [] Motor Control      OT PLAN OF CARE   OT POC based on physician orders, patient diagnosis and results of clinical assessment     Frequency/Duration  2-4 days/wk for 2 - 4weeks PRN   Specific OT Treatment Interventions to include:   ADL retraining/adapted techniques and AE recommendations to increase functional independence within precautions                    Energy conservation techniques to improve tolerance for selfcare routine   Functional transfer/mobility training/DME recommendations for increased independence, safety and fall prevention         Patient/family education to increase safety and functional

## 2025-03-24 NOTE — CARE COORDINATION
Transition of Care-Hospital Day #9-  NGT LIS remains, ID following, antibiotics stopped today. Patient is interested in SNF. List provided. [facility] choice [facility]-referral made with request to submit pre-cert. Second choice is [facility] , third choice is [facility]. Discharge plan is accepting SNF.  Await acceptance.    Marisa MARIN, RN  [facility]

## 2025-03-24 NOTE — PROGRESS NOTES
Physical Therapy  Facility/Department: [address] MED SURG/TELE  Physical Therapy Initial Assessment    Name: Yevgeniy Johnson  : 1960  MRN: [id number]  Date of Service: 3/24/2025    Attending Provider:  Tyrell Palm DO    Evaluating PT:  Hernesto Pavon Jr., P.T.    Room #:  [facility]  Diagnosis:  Shortness of breath [R06.02]  Colovesical fistula [N32.1]  Pelvic abscess in male (HCC) [K65.1]  Procedure/Surgery:  3/19/25 CYSTOSCOPY BILATERAL URETERAL CATHETER INSERTION, EXPLORATORY LAPAROTOMY, COMPLETE MOBILIZATION OF  SPLENIC FLEXURE, TAKEDOWN OF COLOVESICULAR FISTULA, TAKEDOWN OF  ENTEROCOLONIC FISTULA, RIGHT HEMICOLECTOMY WITH PRIMARY STAPLED ANASTOMOSIS, SIGMOIDECTOMY WITH END COLOSTOMY (NISREEN'S), DRAINAGE OF SUBCUTANEOUS, FASCIA, AND PELVIC ABCESS  Precautions:  Falls, bed/chair alarm, NG tube, PCA pump    SUBJECTIVE:    Pt lives alone in a 1 floor apt with 1 step to enter. Pt ambulated with a SPC PTA.    OBJECTIVE:   Initial Evaluation  Date: 3/24/25 Treatment Short Term/ Long Term   Goals   Was pt agreeable to Eval/treatment? yes     Does pt have pain? C/o lower abdominal pain 8/10 and chronic LBP 6-7/10     Bed Mobility  NA, pt was found and left sitting up in chair.   Independent   Transfers Sit to stand: MIN A  Stand to sit: MIN A  Stand pivot: MIN A with ww  Independent    Ambulation   20 feet with ww MIN A  250 feet with ww if needed Independent    Stair negotiation: ascended and descended NA  1 step with 1 rail Independent    AM-PAC 6 Clicks        BLE ROM is WFL.   BLE strength is grossly 4/5.   Sensation:  Pt denies numbness and tingling to extremities  Balance: sitting is supervision and standing with ww is SBA/MIN A  Endurance: fair-    Patient education  Pt educated on transfers    Patient response to education:   Pt verbalized understanding Pt demonstrated skill Pt requires further education in this area   yes Yes with VCs yes     ASSESSMENT:    Conditions Requiring Skilled  to improve safety and independence with all functional transfers   [x] Gait Training to improve gait mechanics, endurance and assess need for appropriate assistive device  [x] Stair Training in preparation for safe discharge home and/or into the community   [] Positioning to prevent skin breakdown and contractures  [] Safety and Education Training   [x] Patient/Caregiver Education   [] HEP  [] Other     PT long term treatment goals are located in above grid    Frequency of treatments: 2-5x/week x 1-2 weeks.    Time in  11:10  Time out  11:40    Total Treatment Time 15 minutes     Evaluation Time includes thorough review of current medical information, gathering information on past medical history/social history and prior level of function, completion of standardized testing/informal observation of tasks, assessment of data and education on plan of care and goals.    CPT codes:  [x] Low Complexity PT evaluation 25219  [] Moderate Complexity PT evaluation 70811  [] High Complexity PT evaluation 65685  [] PT Re-evaluation 15311  [] Gait training 01676 ** minutes  [] Manual therapy 91427 ** minutes  [x] Therapeutic activities 79387 15 minutes  [] Therapeutic exercises 69996 ** minutes  [] Neuromuscular reeducation 25448 ** minutes     Hernesto Pavon Jr., P.T.  License Number: [id number]

## 2025-03-24 NOTE — PROGRESS NOTES
GENERAL SURGERY  DAILY PROGRESS NOTE  3/24/2025    Chief Complaint   Patient presents with    Constipation     X3 days    Shortness of Breath    Nasal Congestion    Abdominal Pain     Mid lower abd pain        Subjective:  NGT w 900 overnight  No issues overnight  Passing gas through ostomy, having ostomy OP too   No N/V   Objective:  /74   Pulse 83   Temp 99 °F (37.2 °C) (Oral)   Resp 16   Ht 1.778 m (5' 10\")   Wt 104.9 kg (231 lb 4.2 oz)   SpO2 92%   BMI 33.18 kg/m²     Gen: aox3, nad  Cvs: rrr  Lungs: non labored  Abd: soft, moderately distended, appropriately tender, ostomy pink and viable, brown stool in bag,     Assessment/Plan:  64 y.o. male w colovesical fistula s/p exlap with takedown colovesicular & SB fistula, R ricky, Nona's, drainage of intra-abd abscess 3/19     Maintain raphael 2wks  NGT to LIWS  Ok for ice chips - will discuss clamp trial with attending today   Prn pain/nausea control  IV abx; appreciate ID recs  Wbc down to normal  Encourage OOB, ambulation  Lovenox for dvt pc  DC planning, PT/OT

## 2025-03-24 NOTE — PROGRESS NOTES
Internal Medicine Progress Note    Patient's name: Yevgeniy Johnson  : 1960  Chief complaints (on day of admission): Constipation (X3 days), Shortness of Breath, Nasal Congestion, and Abdominal Pain (Mid lower abd pain )  Admission date: 3/15/2025  Date of service: 3/24/2025   Room: [address] MED SURG/TELE  Primary care physician: Tanvir Levy MD  Reason for visit: Follow-up for colovesical fistula/diverticular abscess    Subjective  Yevgeniy is seen lying in bed asleep, in no distress. He does easily awaken to voice. He reports that overall he does seem to be feeling better. He denies any nausea or vomiting. Reports that his pain seems to be well controlled. He does have a sore throat and some increased phlegm, hoping the NG can come out soon. In discussion with nursing he still is having increased output from the NG with 900cc overnight. He is having stool/gas from the ostomy. No other issues or concerns from nursing.    Review of Systems  Full 10 point review of systems negative unless mentioned above.    Hospital Medications  Current Facility-Administered Medications   Medication Dose Route Frequency Provider Last Rate Last Admin    enoxaparin Sodium (LOVENOX) injection 30 mg  30 mg SubCUTAneous BID César Noel MD   30 mg at 25    naloxone 0.4 mg in 10 mL sodium chloride syringe   IntraVENous PRN César Noel MD        polyethylene glycol (GLYCOLAX) packet 17 g  17 g Oral Daily PRN César Noel MD        HYDROmorphone (DILAUDID) 1 mg/mL PCA   IntraVENous Continuous César Noel MD   New Bag at 25 2148    calcium carbonate (TUMS) chewable tablet 500 mg  500 mg Oral 4x Daily PRN Tyrell Palm DO   500 mg at 25 203    0.9 % sodium chloride infusion   IntraVENous PRN César Noel MD 50 mL/hr at 25 0550 New Bag at 25 0550    piperacillin-tazobactam (ZOSYN) injection 3,375 mg  3,375 mg IntraVENous Q8H César Noel MD   3,375 mg at  is a colovesical fistula that appears to communicate with the   sigmoid lumen.   3. There is diffuse mucosal thickening of the bladder concerning for cystitis.   4. Cholelithiasis.   5. Mild central duct dilation.   6. Diverticulosis.   7. Small fixed hiatus hernia.   8. There are ground-glass opacities within the right middle lobe with   bronchiectatic change and tree-in-bud opacities suggests inflammatory change.   This is not significantly improved when compared the previous study.         XR CHEST (2 VW)   Final Result   Mild increased markings present. No acute cardiopulmonary process.             Assessment   Active Hospital Problems    Diagnosis     COPD (chronic obstructive pulmonary disease) ([facility]) [J44.9]     Pelvic abscess in male ([facility]) [K65.1]     Hypertension [I10]          Plan  Colovesical Fistula with Diverticular Abscess  CT abdomen/pelvis noted -- originally scheduled for OR next month  S/p takedown of colovesicular fistula and SB fistula with right hemicolectomy, Kang's and drainage of intra-abdominal abscess 3/19  Maintain Jorge catheter x 2 weeks per Surgery  Pain control with PCA pump, Oxy IR as needed -- adjustments per Surgery  Continue IV Zosyn as per ID  Encourage ambulation and increased activity  General Surgery and ID consults appreciated    Nausea and Vomiting --  Post-operative Ileus  KUB 3/21 noted -- s/p NG placement  Continue NG to LIS per Surgery  Now having output from ostomy  OK for ice chips -- may be able to trial clamping soon however defer to Surgery  General Surgery consult appreciated    ?COVID-19; ruled out  Rapid COVID indeterminate   Full viral respiratory panel negative   No indication for Remdesivir, Baricitinib or Decadron    BPH with Urinary Retention  Holding Flomax due to NPO with concerns for ileus  Jorge is to remain in place x 2 weeks post-operatively per Surgery    Generalized Anxiety Disorder  Holding Remeron due to NPO  Continue Vistaril as  needed    Nicotine Use Disorder  Counseling and cessation advised  Continue Nicotine patch    Hx Opiate Abuse  Continue Suboxone as at home    Follow labs   DVT prophylaxis  Please see orders for further management and care.  Discharge plan: likely home with [facility] when stable -- not today but possibly by the end of the week    The pertinent details of this case were discussed with Dr. Palm.    Electronically signed by STUART Ji CNP on 3/24/2025 at 7:20 AM    Addendum: I have personally participated in a face-to-face history and physical exam on the date of service with the patient. I have discussed the case with the nurse practitioner. I also participated in medical decision making on the date of service and I agree with all of the pertinent clinical information unless indicated in my editing of the note. I have reviewed and edited the note above based on my findings during my history, exam, and decision making on the same day of service.     My additional thoughts:   He was seen and examined his room sitting up in a chair  Postoperative ileus noted but now he is having some output from his ostomy  NG tube is still in place-clamping trial currently in process  Continue antibiotics per ID  Continue pain meds per general surgery  Jorge catheter remains in place  Hopefully home with home health care by the end of the week    Electronically signed by Tyrell Palm DO on 3/24/2025 at 9:09 AM    I can be reached through [employer].

## 2025-03-25 ENCOUNTER — APPOINTMENT (OUTPATIENT)
Dept: GENERAL RADIOLOGY | Age: 65
End: 2025-03-25
Payer: MEDICARE

## 2025-03-25 LAB
ALBUMIN SERPL-MCNC: 2.7 G/DL (ref 3.5–5.2)
ALP SERPL-CCNC: 66 U/L (ref 40–129)
ALT SERPL-CCNC: 16 U/L (ref 0–40)
ANION GAP SERPL CALCULATED.3IONS-SCNC: 11 MMOL/L (ref 7–16)
AST SERPL-CCNC: 16 U/L (ref 0–39)
BASOPHILS # BLD: 0.05 K/UL (ref 0–0.2)
BASOPHILS NFR BLD: 1 % (ref 0–2)
BILIRUB SERPL-MCNC: 0.3 MG/DL (ref 0–1.2)
BUN SERPL-MCNC: 9 MG/DL (ref 6–23)
CALCIUM SERPL-MCNC: 8.3 MG/DL (ref 8.6–10.2)
CHLORIDE SERPL-SCNC: 99 MMOL/L (ref 98–107)
CO2 SERPL-SCNC: 27 MMOL/L (ref 22–29)
CREAT SERPL-MCNC: 0.5 MG/DL (ref 0.7–1.2)
EOSINOPHIL # BLD: 0.28 K/UL (ref 0.05–0.5)
EOSINOPHILS RELATIVE PERCENT: 3 % (ref 0–6)
ERYTHROCYTE [DISTWIDTH] IN BLOOD BY AUTOMATED COUNT: 13.1 % (ref 11.5–15)
GFR, ESTIMATED: >90 ML/MIN/1.73M2
GLUCOSE SERPL-MCNC: 84 MG/DL (ref 74–99)
HCT VFR BLD AUTO: 31.3 % (ref 37–54)
HGB BLD-MCNC: 10.1 G/DL (ref 12.5–16.5)
IMM GRANULOCYTES # BLD AUTO: 0.08 K/UL (ref 0–0.58)
IMM GRANULOCYTES NFR BLD: 1 % (ref 0–5)
LYMPHOCYTES NFR BLD: 1.13 K/UL (ref 1.5–4)
LYMPHOCYTES RELATIVE PERCENT: 11 % (ref 20–42)
MAGNESIUM SERPL-MCNC: 2 MG/DL (ref 1.6–2.6)
MCH RBC QN AUTO: 29.6 PG (ref 26–35)
MCHC RBC AUTO-ENTMCNC: 32.3 G/DL (ref 32–34.5)
MCV RBC AUTO: 91.8 FL (ref 80–99.9)
MONOCYTES NFR BLD: 0.79 K/UL (ref 0.1–0.95)
MONOCYTES NFR BLD: 8 % (ref 2–12)
NEUTROPHILS NFR BLD: 77 % (ref 43–80)
NEUTS SEG NFR BLD: 7.94 K/UL (ref 1.8–7.3)
PLATELET # BLD AUTO: 368 K/UL (ref 130–450)
PMV BLD AUTO: 8.8 FL (ref 7–12)
POTASSIUM SERPL-SCNC: 3.2 MMOL/L (ref 3.5–5)
PROT SERPL-MCNC: 5.4 G/DL (ref 6.4–8.3)
RBC # BLD AUTO: 3.41 M/UL (ref 3.8–5.8)
SODIUM SERPL-SCNC: 137 MMOL/L (ref 132–146)
WBC OTHER # BLD: 10.3 K/UL (ref 4.5–11.5)

## 2025-03-25 PROCEDURE — 2700000000 HC OXYGEN THERAPY PER DAY

## 2025-03-25 PROCEDURE — 6370000000 HC RX 637 (ALT 250 FOR IP): Performed by: STUDENT IN AN ORGANIZED HEALTH CARE EDUCATION/TRAINING PROGRAM

## 2025-03-25 PROCEDURE — 6360000002 HC RX W HCPCS: Performed by: STUDENT IN AN ORGANIZED HEALTH CARE EDUCATION/TRAINING PROGRAM

## 2025-03-25 PROCEDURE — 2500000003 HC RX 250 WO HCPCS: Performed by: STUDENT IN AN ORGANIZED HEALTH CARE EDUCATION/TRAINING PROGRAM

## 2025-03-25 PROCEDURE — 83735 ASSAY OF MAGNESIUM: CPT

## 2025-03-25 PROCEDURE — 0DH67UZ INSERTION OF FEEDING DEVICE INTO STOMACH, VIA NATURAL OR ARTIFICIAL OPENING: ICD-10-PCS | Performed by: STUDENT IN AN ORGANIZED HEALTH CARE EDUCATION/TRAINING PROGRAM

## 2025-03-25 PROCEDURE — 1200000000 HC SEMI PRIVATE

## 2025-03-25 PROCEDURE — 94640 AIRWAY INHALATION TREATMENT: CPT

## 2025-03-25 PROCEDURE — 80053 COMPREHEN METABOLIC PANEL: CPT

## 2025-03-25 PROCEDURE — 36415 COLL VENOUS BLD VENIPUNCTURE: CPT

## 2025-03-25 PROCEDURE — 6360000002 HC RX W HCPCS

## 2025-03-25 PROCEDURE — 74018 RADEX ABDOMEN 1 VIEW: CPT

## 2025-03-25 PROCEDURE — 85025 COMPLETE CBC W/AUTO DIFF WBC: CPT

## 2025-03-25 RX ADMIN — SODIUM CHLORIDE, PRESERVATIVE FREE 10 ML: 5 INJECTION INTRAVENOUS at 21:13

## 2025-03-25 RX ADMIN — OXYCODONE HYDROCHLORIDE 10 MG: 5 TABLET ORAL at 21:12

## 2025-03-25 RX ADMIN — ACETAMINOPHEN 650 MG: 325 TABLET ORAL at 11:17

## 2025-03-25 RX ADMIN — HYDROXYZINE PAMOATE 25 MG: 25 CAPSULE ORAL at 10:33

## 2025-03-25 RX ADMIN — ENOXAPARIN SODIUM 30 MG: 100 INJECTION SUBCUTANEOUS at 21:13

## 2025-03-25 RX ADMIN — HYDROMORPHONE HYDROCHLORIDE 1 MG: 1 INJECTION, SOLUTION INTRAMUSCULAR; INTRAVENOUS; SUBCUTANEOUS at 16:25

## 2025-03-25 RX ADMIN — BUPRENORPHINE HYDROCHLORIDE AND NALOXONE HYDROCHLORIDE DIHYDRATE 2 TABLET: 8; 2 TABLET SUBLINGUAL at 08:59

## 2025-03-25 RX ADMIN — HYDROMORPHONE HYDROCHLORIDE 1 MG: 1 INJECTION, SOLUTION INTRAMUSCULAR; INTRAVENOUS; SUBCUTANEOUS at 12:06

## 2025-03-25 RX ADMIN — POTASSIUM BICARBONATE 40 MEQ: 782 TABLET, EFFERVESCENT ORAL at 11:16

## 2025-03-25 RX ADMIN — ENOXAPARIN SODIUM 30 MG: 100 INJECTION SUBCUTANEOUS at 09:00

## 2025-03-25 RX ADMIN — ALBUTEROL SULFATE 2.5 MG: 2.5 SOLUTION RESPIRATORY (INHALATION) at 13:43

## 2025-03-25 RX ADMIN — IPRATROPIUM BROMIDE 0.5 MG: 0.5 SOLUTION RESPIRATORY (INHALATION) at 21:00

## 2025-03-25 RX ADMIN — HYDROXYZINE PAMOATE 25 MG: 25 CAPSULE ORAL at 21:12

## 2025-03-25 RX ADMIN — ARFORMOTEROL TARTRATE 15 MCG: 15 SOLUTION RESPIRATORY (INHALATION) at 21:00

## 2025-03-25 RX ADMIN — OXYCODONE HYDROCHLORIDE 10 MG: 5 TABLET ORAL at 03:19

## 2025-03-25 RX ADMIN — BUPRENORPHINE HYDROCHLORIDE AND NALOXONE HYDROCHLORIDE DIHYDRATE 1 TABLET: 8; 2 TABLET SUBLINGUAL at 21:12

## 2025-03-25 ASSESSMENT — PAIN SCALES - GENERAL
PAINLEVEL_OUTOF10: 9
PAINLEVEL_OUTOF10: 8
PAINLEVEL_OUTOF10: 8

## 2025-03-25 ASSESSMENT — PAIN DESCRIPTION - DESCRIPTORS
DESCRIPTORS: ACHING;BURNING;SORE
DESCRIPTORS: ACHING;DISCOMFORT;SORE

## 2025-03-25 ASSESSMENT — PAIN DESCRIPTION - LOCATION
LOCATION: ABDOMEN
LOCATION: ABDOMEN

## 2025-03-25 ASSESSMENT — PAIN DESCRIPTION - ORIENTATION: ORIENTATION: RIGHT;LEFT;LOWER;MID

## 2025-03-25 NOTE — PROGRESS NOTES
[facility]  [facility]  Progress Note    SUBJECTIVE:  Chief Complaint   Patient presents with    Constipation     X3 days    Shortness of Breath    Nasal Congestion    Abdominal Pain     Mid lower abd pain      Patient sitting up in the chair.  NG tube to suction -had some nausea yesterday afternoon after clamping, denies nausea today  No fevers    Review of systems:  As stated above in the chief complaint, otherwise negative.    Medications:  Scheduled Meds:   enoxaparin  30 mg SubCUTAneous BID    acetaminophen  650 mg Oral 4 times per day    [Held by provider] methocarbamol  1,000 mg Oral 4x Daily    buprenorphine-naloxone  2 tablet SubLINGual Daily    buprenorphine-naloxone  1 tablet SubLINGual QHS    nicotine  1 patch TransDERmal Daily    [Held by provider] mirtazapine  30 mg Oral Nightly    [Held by provider] tamsulosin  0.4 mg Oral Daily    sodium chloride flush  10 mL IntraVENous 2 times per day    arformoterol tartrate  15 mcg Nebulization BID RT    And    ipratropium  0.5 mg Nebulization BID     Continuous Infusions:   sodium chloride 50 mL/hr at 25 0550    lactated ringers 125 mL/hr at 25 1842    sodium chloride       PRN Meds:ketorolac, HYDROmorphone, calcium carbonate, sodium chloride, oxyCODONE **OR** oxyCODONE, hydrOXYzine pamoate, traZODone, albuterol, sodium chloride flush, sodium chloride, potassium chloride **OR** potassium alternative oral replacement **OR** potassium chloride, magnesium sulfate, ondansetron **OR** ondansetron, melatonin, fluticasone    OBJECTIVE:  /75   Pulse 80   Temp 98.1 °F (36.7 °C) (Axillary)   Resp 18   Ht 1.778 m (5' 10\")   Wt 103.9 kg (229 lb 0.9 oz)   SpO2 95%   BMI 32.87 kg/m²   Temp  Av.3 °F (36.8 °C)  Min: 98.1 °F (36.7 °C)  Max: 98.4 °F (36.9 °C)  Constitutional: Patient is sitting up in the chair.  He is in no distress.  Awake and alert  Skin: Warm and dry. No rashes were noted.   HEENT: Round and  reactive pupils.  Moist mucous membranes.  No ulcerations or thrush.  NG tube to suction  Neck: Supple to movements.   Chest: No use of accessory muscles to breathe. Symmetrical expansion.  Clear throughout  Nasal cannula oxygen  Cardiovascular: S1 and S2 are rhythmic and regular. No murmurs appreciated.   Abdomen: bowel sounds to auscultation.  Right GARRETT drain with bloody drainage.  Ostomy with gas and some stools.  Dry dressing to the mid abdomen.  Extremities: No edema.  Lines: Peripheral.  Jorge catheter with clear urine    Laboratory and Tests:  Reviewed    Radiology:  Reviewed    Microbiology:   Rapid COVID 3/15: Indeterminate  RVP 3/16: Negative  Blood cultures 3/15: Negative so far  Urine culture 3/15: Mixed armani, ox positive    ASSESSMENT:  Diverticular abscess  Colovesical fistula  Bacteriuria associated to colovesical fistula  Constipation  S/p exlap with takedown colovesicular & SB fistula, R ricky, Nona's, drainage of intra-abd abscess 3/19   Postop ileus  Leukocytosis, likely reactive postop, pain, and ileus, resolved    PLAN:  Antibiotics completed 3/24/2025  Labs and cultures reviewed  We will follow with you    STUART Delacruz - CNP  1:22 PM  3/25/2025    Patient seen and examined. I had a face to face encounter with the patient. Agree with exam.  Assessment and plan as outlined above and directed by me. Addition and corrections were done as deemed appropriate. My exam and plan include: Events noted.  The NG tube was replaced.  The patient still off antibiotics.  At this point no further recommendations.  ID is signed off.  Call if needed.    Franky Sheppard MD  3/25/2025  2:53 PM

## 2025-03-25 NOTE — FLOWSHEET NOTE
ET Nurse (follow up) 545  Admit Date: 3/15/2025  1:52 PM    Reason for consult:  new colostomy    Stoma assessment:     03/25/25 1429   Colostomy LLQ   Placement Date: 03/19/25   Present on Admission/Arrival: No  Location: LLQ   Stomal Appliance 1 piece;Flat;Clean, dry & intact   Stoma  Assessment ELICEO   Peristomal Assessment ELICEO   Mucocutaneous Junction   (ELICEO)   Stool Appearance Soft   Stool Color Brown   Stool Amount Small   Output (mL) 175 ml       Patient decline colostomy care lesson    Plan:   Change pouch, continue colostomy care lesson, will follow.     Leidy Britton RN 3/25/2025 5:41 PM

## 2025-03-25 NOTE — PROGRESS NOTES
Comprehensive Nutrition Assessment    Type and Reason for Visit:  Reassess, NPO/clear liquid    Nutrition Recommendations/Plan:   Continue to advance diet, as tolerated  Will follow up prior to D/C re: Colonoscopy Nutrition Guideline teaching  Continue inpatient monitoring      Malnutrition Assessment:  Malnutrition Status:  At risk for malnutrition (03/20/25 3992)    Context:  Acute Illness     Findings of the 6 clinical characteristics of malnutrition:  Energy Intake:  75% or less of estimated energy requirements for 7 or more days  Weight Loss:  Unable to assess (no wt hx in EMR <1 yr)     Body Fat Loss:  No body fat loss     Muscle Mass Loss:  No muscle mass loss    Fluid Accumulation:  No fluid accumulation     Strength:  Not Performed    Nutrition Assessment:    Pt currently NPO/CLD x 6 d postop. NGT clamp trial and less nausea/bloating today. Ostomy with some stool output. Resumed ONS with CLD as HP gelatin ONS BID and Wound Healing ONS BID. Recommend continue to advance diet as tolerated and will continue inpatient monitoring.    Nutrition Related Findings:    Rotund abd +BS, abd drain, colostomy w/small brown,watery stool, +1 edema, reduced appetite, NGT clamp, -I/O 13L Wound Type: Surgical Incision       Current Nutrition Intake & Therapies:    Average Meal Intake: Unable to assess  Average Supplements Intake: Unable to assess  ADULT DIET; Clear Liquid; No Carbonated Beverages  ADULT ORAL NUTRITION SUPPLEMENT; Breakfast, Dinner; Wound Healing Oral Supplement  ADULT ORAL NUTRITION SUPPLEMENT; Lunch, Dinner; Fortified Gelatin Oral Supplement    Anthropometric Measures:  Height: 177.8 cm (5' 10\")  Ideal Body Weight (IBW): 166 lbs (75 kg)    Admission Body Weight: 97.5 kg (214 lb 15.2 oz) (3/15 bedscale)  Current Body Weight: 103.9 kg (229 lb 0.9 oz), 125.3 % IBW. Weight Source: Bed scale (3/25)  Current BMI (kg/m2): 32.9  Usual Body Weight:  (no wt hx in EMR <1 yr)        Weight Adjustment For: No

## 2025-03-25 NOTE — PROGRESS NOTES
GENERAL SURGERY  DAILY PROGRESS NOTE  3/25/2025    Chief Complaint   Patient presents with    Constipation     X3 days    Shortness of Breath    Nasal Congestion    Abdominal Pain     Mid lower abd pain        Subjective:  Nauseated yesteday, improved this morning. NGT to LIWS overnight. Ostomy w stool in bag. Does not feel bloated.    Objective:  /82   Pulse 85   Temp 98.4 °F (36.9 °C) (Oral)   Resp 16   Ht 1.778 m (5' 10\")   Wt 105.2 kg (231 lb 14.8 oz)   SpO2 97%   BMI 33.28 kg/m²     Gen: aox3, nad  Cvs: rrr  Lungs: non labored  Abd: soft, moderately distended, appropriately tender, ostomy pink and viable, brown stool in bag, NGT to LIWS    Assessment/Plan:  64 y.o. male w colovesical fistula s/p exlap with takedown colovesicular & SB fistula, R ricky, Nona's, drainage of intra-abd abscess 3/19     Maintain raphael 2wks  NGT to LIWS  Ok for ice chips - will discuss clamp trial again today with attending  Prn pain/nausea control  IV abx; appreciate ID recs  Encourage OOB, ambulation  Lovenox for dvt pc  DC planning, PT/OT

## 2025-03-25 NOTE — PROGRESS NOTES
Internal Medicine Progress Note    Patient's name: Yevgeniy Johnson  : 1960  Chief complaints (on day of admission): Constipation (X3 days), Shortness of Breath, Nasal Congestion, and Abdominal Pain (Mid lower abd pain )  Admission date: 3/15/2025  Date of service: 3/25/2025   Room: [address] MED SURG/TELE  Primary care physician: Tanvir Levy MD  Reason for visit: Follow-up for colovesical fistula/diverticular abscess    Subjective  Yevgeniy is seen lying in bed asleep, in no distress. He does easily awaken to voice. He reports being tired, throat is still sore and having a lot of phlegm. He denies any nausea or vomiting. Denies any abdominal pain or discomfort. He did do a trial with his NG clamped yesterday however became nauseated so it was placed back to suction and he was made NPO again. He is hoping to trial again today as he wants the NG out, doesn't like it and is more uncomfortable with it to suction. No other issues or concerns from nursing.    Review of Systems  Full 10 point review of systems negative unless mentioned above.    Hospital Medications  Current Facility-Administered Medications   Medication Dose Route Frequency Provider Last Rate Last Admin    ketorolac (TORADOL) injection 15 mg  15 mg IntraVENous Q6H PRN Francis Barrett MD        HYDROmorphone (DILAUDID) injection 1 mg  1 mg IntraVENous Q3H PRN Gale Ramirez DO        enoxaparin Sodium (LOVENOX) injection 30 mg  30 mg SubCUTAneous BID César Noel MD   30 mg at 25    calcium carbonate (TUMS) chewable tablet 500 mg  500 mg Oral 4x Daily PRN Tyrell Palm DO   500 mg at 25    0.9 % sodium chloride infusion   IntraVENous PRN César Noel MD 50 mL/hr at 25 0550 New Bag at 25 0550    acetaminophen (TYLENOL) tablet 650 mg  650 mg Oral 4 times per day César Noel MD   650 mg at 25 1043    [Held by provider] methocarbamol (ROBAXIN) tablet 1,000 mg  1,000 mg Oral 4x Daily  of the week    The pertinent details of this case were discussed with Dr. Palm.    Electronically signed by STUART Ji CNP on 3/25/2025 at 7:17 AM    Addendum: I have personally participated in a face-to-face history and physical exam on the date of service with the patient. I have discussed the case with the nurse practitioner. I also participated in medical decision making on the date of service and I agree with all of the pertinent clinical information unless indicated in my editing of the note. I have reviewed and edited the note above based on my findings during my history, exam, and decision making on the same day of service.     My additional thoughts:   He was seen and examined in his room sitting up in a chair  He told me that he is feeling somewhat better  He had nausea yesterday after NG tube was clamped-back to low intermittent suction  Continue pain meds and nausea control  Antibiotics per ID  Encourage increase activity and ambulation  Obviously no discharge today or in the near future    Electronically signed by Tyrell Palm DO on 3/25/2025 at 8:29 AM    I can be reached through [employer].

## 2025-03-26 ENCOUNTER — APPOINTMENT (OUTPATIENT)
Dept: CT IMAGING | Age: 65
End: 2025-03-26
Payer: MEDICARE

## 2025-03-26 LAB
ALBUMIN SERPL-MCNC: 2.5 G/DL (ref 3.5–5.2)
ALP SERPL-CCNC: 62 U/L (ref 40–129)
ALT SERPL-CCNC: 19 U/L (ref 0–40)
ANION GAP SERPL CALCULATED.3IONS-SCNC: 15 MMOL/L (ref 7–16)
AST SERPL-CCNC: 19 U/L (ref 0–39)
BASOPHILS # BLD: 0.03 K/UL (ref 0–0.2)
BASOPHILS NFR BLD: 0 % (ref 0–2)
BILIRUB SERPL-MCNC: 0.3 MG/DL (ref 0–1.2)
BUN SERPL-MCNC: 6 MG/DL (ref 6–23)
CALCIUM SERPL-MCNC: 8.3 MG/DL (ref 8.6–10.2)
CHLORIDE SERPL-SCNC: 97 MMOL/L (ref 98–107)
CO2 SERPL-SCNC: 24 MMOL/L (ref 22–29)
CREAT SERPL-MCNC: 0.5 MG/DL (ref 0.7–1.2)
EOSINOPHIL # BLD: 0.28 K/UL (ref 0.05–0.5)
EOSINOPHILS RELATIVE PERCENT: 3 % (ref 0–6)
ERYTHROCYTE [DISTWIDTH] IN BLOOD BY AUTOMATED COUNT: 13 % (ref 11.5–15)
GFR, ESTIMATED: >90 ML/MIN/1.73M2
GLUCOSE SERPL-MCNC: 78 MG/DL (ref 74–99)
HCT VFR BLD AUTO: 31 % (ref 37–54)
HGB BLD-MCNC: 9.9 G/DL (ref 12.5–16.5)
IMM GRANULOCYTES # BLD AUTO: 0.04 K/UL (ref 0–0.58)
IMM GRANULOCYTES NFR BLD: 1 % (ref 0–5)
LYMPHOCYTES NFR BLD: 1.15 K/UL (ref 1.5–4)
LYMPHOCYTES RELATIVE PERCENT: 14 % (ref 20–42)
MAGNESIUM SERPL-MCNC: 1.8 MG/DL (ref 1.6–2.6)
MCH RBC QN AUTO: 29.1 PG (ref 26–35)
MCHC RBC AUTO-ENTMCNC: 31.9 G/DL (ref 32–34.5)
MCV RBC AUTO: 91.2 FL (ref 80–99.9)
MONOCYTES NFR BLD: 0.7 K/UL (ref 0.1–0.95)
MONOCYTES NFR BLD: 9 % (ref 2–12)
NEUTROPHILS NFR BLD: 73 % (ref 43–80)
NEUTS SEG NFR BLD: 5.94 K/UL (ref 1.8–7.3)
PLATELET # BLD AUTO: 392 K/UL (ref 130–450)
PMV BLD AUTO: 8.9 FL (ref 7–12)
POTASSIUM SERPL-SCNC: 3.3 MMOL/L (ref 3.5–5)
PROT SERPL-MCNC: 5.3 G/DL (ref 6.4–8.3)
RBC # BLD AUTO: 3.4 M/UL (ref 3.8–5.8)
SODIUM SERPL-SCNC: 136 MMOL/L (ref 132–146)
WBC OTHER # BLD: 8.1 K/UL (ref 4.5–11.5)

## 2025-03-26 PROCEDURE — 6370000000 HC RX 637 (ALT 250 FOR IP): Performed by: NURSE PRACTITIONER

## 2025-03-26 PROCEDURE — 80053 COMPREHEN METABOLIC PANEL: CPT

## 2025-03-26 PROCEDURE — 6360000002 HC RX W HCPCS: Performed by: STUDENT IN AN ORGANIZED HEALTH CARE EDUCATION/TRAINING PROGRAM

## 2025-03-26 PROCEDURE — 36415 COLL VENOUS BLD VENIPUNCTURE: CPT

## 2025-03-26 PROCEDURE — 2500000003 HC RX 250 WO HCPCS: Performed by: STUDENT IN AN ORGANIZED HEALTH CARE EDUCATION/TRAINING PROGRAM

## 2025-03-26 PROCEDURE — 85025 COMPLETE CBC W/AUTO DIFF WBC: CPT

## 2025-03-26 PROCEDURE — 6360000002 HC RX W HCPCS

## 2025-03-26 PROCEDURE — 6360000004 HC RX CONTRAST MEDICATION: Performed by: RADIOLOGY

## 2025-03-26 PROCEDURE — 94640 AIRWAY INHALATION TREATMENT: CPT

## 2025-03-26 PROCEDURE — 6370000000 HC RX 637 (ALT 250 FOR IP): Performed by: STUDENT IN AN ORGANIZED HEALTH CARE EDUCATION/TRAINING PROGRAM

## 2025-03-26 PROCEDURE — 97530 THERAPEUTIC ACTIVITIES: CPT

## 2025-03-26 PROCEDURE — 72193 CT PELVIS W/DYE: CPT

## 2025-03-26 PROCEDURE — BT101ZZ FLUOROSCOPY OF BLADDER USING LOW OSMOLAR CONTRAST: ICD-10-PCS | Performed by: STUDENT IN AN ORGANIZED HEALTH CARE EDUCATION/TRAINING PROGRAM

## 2025-03-26 PROCEDURE — 1200000000 HC SEMI PRIVATE

## 2025-03-26 PROCEDURE — 2700000000 HC OXYGEN THERAPY PER DAY

## 2025-03-26 PROCEDURE — 83735 ASSAY OF MAGNESIUM: CPT

## 2025-03-26 RX ORDER — IOPAMIDOL 755 MG/ML
40 INJECTION, SOLUTION INTRAVASCULAR
Status: COMPLETED | OUTPATIENT
Start: 2025-03-26 | End: 2025-03-26

## 2025-03-26 RX ORDER — POTASSIUM CHLORIDE 1500 MG/1
40 TABLET, EXTENDED RELEASE ORAL ONCE
Status: COMPLETED | OUTPATIENT
Start: 2025-03-26 | End: 2025-03-26

## 2025-03-26 RX ADMIN — SODIUM CHLORIDE, PRESERVATIVE FREE 10 ML: 5 INJECTION INTRAVENOUS at 09:13

## 2025-03-26 RX ADMIN — MIRTAZAPINE 30 MG: 15 TABLET, FILM COATED ORAL at 21:19

## 2025-03-26 RX ADMIN — TAMSULOSIN HYDROCHLORIDE 0.4 MG: 0.4 CAPSULE ORAL at 09:10

## 2025-03-26 RX ADMIN — OXYCODONE HYDROCHLORIDE 10 MG: 5 TABLET ORAL at 13:15

## 2025-03-26 RX ADMIN — ACETAMINOPHEN 650 MG: 325 TABLET ORAL at 17:22

## 2025-03-26 RX ADMIN — IPRATROPIUM BROMIDE 0.5 MG: 0.5 SOLUTION RESPIRATORY (INHALATION) at 20:27

## 2025-03-26 RX ADMIN — OXYCODONE HYDROCHLORIDE 10 MG: 5 TABLET ORAL at 06:05

## 2025-03-26 RX ADMIN — ONDANSETRON 4 MG: 2 INJECTION INTRAMUSCULAR; INTRAVENOUS at 10:50

## 2025-03-26 RX ADMIN — ENOXAPARIN SODIUM 30 MG: 100 INJECTION SUBCUTANEOUS at 09:10

## 2025-03-26 RX ADMIN — METHOCARBAMOL TABLETS 1000 MG: 500 TABLET, COATED ORAL at 21:17

## 2025-03-26 RX ADMIN — ACETAMINOPHEN 650 MG: 325 TABLET ORAL at 13:15

## 2025-03-26 RX ADMIN — ENOXAPARIN SODIUM 30 MG: 100 INJECTION SUBCUTANEOUS at 21:19

## 2025-03-26 RX ADMIN — IPRATROPIUM BROMIDE 0.5 MG: 0.5 SOLUTION RESPIRATORY (INHALATION) at 11:18

## 2025-03-26 RX ADMIN — ALBUTEROL SULFATE 2.5 MG: 2.5 SOLUTION RESPIRATORY (INHALATION) at 11:19

## 2025-03-26 RX ADMIN — IOPAMIDOL 40 ML: 755 INJECTION, SOLUTION INTRAVENOUS at 14:04

## 2025-03-26 RX ADMIN — ACETAMINOPHEN 650 MG: 325 TABLET ORAL at 09:11

## 2025-03-26 RX ADMIN — POTASSIUM CHLORIDE 40 MEQ: 1500 TABLET, EXTENDED RELEASE ORAL at 09:10

## 2025-03-26 RX ADMIN — BUPRENORPHINE HYDROCHLORIDE AND NALOXONE HYDROCHLORIDE DIHYDRATE 1 TABLET: 8; 2 TABLET SUBLINGUAL at 21:19

## 2025-03-26 RX ADMIN — OXYCODONE HYDROCHLORIDE 10 MG: 5 TABLET ORAL at 17:24

## 2025-03-26 RX ADMIN — ARFORMOTEROL TARTRATE 15 MCG: 15 SOLUTION RESPIRATORY (INHALATION) at 11:18

## 2025-03-26 RX ADMIN — OXYCODONE HYDROCHLORIDE 10 MG: 5 TABLET ORAL at 21:44

## 2025-03-26 RX ADMIN — ARFORMOTEROL TARTRATE 15 MCG: 15 SOLUTION RESPIRATORY (INHALATION) at 20:28

## 2025-03-26 RX ADMIN — BUPRENORPHINE HYDROCHLORIDE AND NALOXONE HYDROCHLORIDE DIHYDRATE 2 TABLET: 8; 2 TABLET SUBLINGUAL at 09:10

## 2025-03-26 RX ADMIN — HYDROMORPHONE HYDROCHLORIDE 1 MG: 1 INJECTION, SOLUTION INTRAMUSCULAR; INTRAVENOUS; SUBCUTANEOUS at 09:19

## 2025-03-26 RX ADMIN — SODIUM CHLORIDE, PRESERVATIVE FREE 10 ML: 5 INJECTION INTRAVENOUS at 21:19

## 2025-03-26 ASSESSMENT — PAIN SCALES - GENERAL
PAINLEVEL_OUTOF10: 8
PAINLEVEL_OUTOF10: 9
PAINLEVEL_OUTOF10: 7
PAINLEVEL_OUTOF10: 0
PAINLEVEL_OUTOF10: 9
PAINLEVEL_OUTOF10: 5
PAINLEVEL_OUTOF10: 9
PAINLEVEL_OUTOF10: 9

## 2025-03-26 ASSESSMENT — PAIN DESCRIPTION - ORIENTATION
ORIENTATION: UPPER
ORIENTATION: RIGHT

## 2025-03-26 ASSESSMENT — PAIN DESCRIPTION - LOCATION
LOCATION: FOOT
LOCATION: ABDOMEN

## 2025-03-26 ASSESSMENT — PAIN DESCRIPTION - DESCRIPTORS
DESCRIPTORS: STABBING
DESCRIPTORS: ACHING
DESCRIPTORS: ACHING

## 2025-03-26 ASSESSMENT — PAIN SCALES - WONG BAKER: WONGBAKER_NUMERICALRESPONSE: HURTS A LITTLE BIT

## 2025-03-26 NOTE — PROGRESS NOTES
Physical Therapy  Facility/Department: [address] MED SURG/TELE  Treatment Note  Name: Yevgeniy Johnson  : 1960  MRN: [id number]  Date of Service: 3/26/2025    Attending Provider:  Tyrell Palm DO    Evaluating PT:  Hernesto Pavon Jr., P.T.    Room #:  0545/0545-A  Diagnosis:  Shortness of breath [R06.02]  Colovesical fistula [N32.1]  Pelvic abscess in male (HCC) [K65.1]  Procedure/Surgery:  3/19/25 CYSTOSCOPY BILATERAL URETERAL CATHETER INSERTION, EXPLORATORY LAPAROTOMY, COMPLETE MOBILIZATION OF  SPLENIC FLEXURE, TAKEDOWN OF COLOVESICULAR FISTULA, TAKEDOWN OF  ENTEROCOLONIC FISTULA, RIGHT HEMICOLECTOMY WITH PRIMARY STAPLED ANASTOMOSIS, SIGMOIDECTOMY WITH END COLOSTOMY (NISREEN'S), DRAINAGE OF SUBCUTANEOUS, FASCIA, AND PELVIC ABCESS  Precautions:  Falls, bed/chair alarm, GARRETT Drain    SUBJECTIVE:    Pt lives alone in a 1 floor apt with 1 step to enter. Pt ambulated with a SPC PTA.    OBJECTIVE:   Initial Evaluation  Date: 3/24/25 Treatment Short Term/ Long Term   Goals   Was pt agreeable to Eval/treatment? yes yes    Does pt have pain? C/o lower abdominal pain 8/10 and chronic LBP 6-10 C/o lower abdominal pain and chronic back pain    Bed Mobility  NA, pt was found and left sitting up in chair.  Rolling: SBA  Supine to sit: MOD A  Sit to supine: SBA with HOB elevated  Scooting: SBA Independent   Transfers Sit to stand: MIN A  Stand to sit: MIN A  Stand pivot: MIN A with ww Sit to stand: MIN A  Stand to sit: MIN A  Stand pivot: MIN A with ww Independent    Ambulation   20 feet with ww MIN A 10 feet x2 reps and 25 feet x 4 reps with ww SBA/MIN A 250 feet with ww if needed Independent    Stair negotiation: ascended and descended NA NA, pt fatigued with amb 1 step with 1 rail Independent    AM-PAC 6 Clicks       BLE ROM is WFL.   BLE strength is grossly 4/5.   Sensation:  Pt denies numbness and tingling to extremities  Balance: sitting is supervision and standing with ww is SBA/MIN

## 2025-03-26 NOTE — PLAN OF CARE
Problem: Discharge Planning  Goal: Discharge to home or other facility with appropriate resources  Outcome: Progressing  Flowsheets (Taken 3/26/2025 0258)  Discharge to home or other facility with appropriate resources: Identify barriers to discharge with patient and caregiver     Problem: Pain  Goal: Verbalizes/displays adequate comfort level or baseline comfort level  Outcome: Progressing     Problem: ABCDS Injury Assessment  Goal: Absence of physical injury  Outcome: Progressing     Problem: Safety - Adult  Goal: Free from fall injury  Outcome: Progressing     Problem: Nutrition Deficit:  Goal: Optimize nutritional status  Outcome: Progressing  Flowsheets (Taken 3/25/2025 1738 by Jazzy Lance, RD, CNSC, LD)  Nutrient intake appropriate for improving, restoring, or maintaining nutritional needs:   Assess nutritional status and recommend course of action   Monitor oral intake, labs, and treatment plans   Recommend appropriate diets, oral nutritional supplements, and vitamin/mineral supplements     Problem: Skin/Tissue Integrity  Goal: Skin integrity remains intact  Description: 1.  Monitor for areas of redness and/or skin breakdown  2.  Assess vascular access sites hourly  3.  Every 4-6 hours minimum:  Change oxygen saturation probe site  4.  Every 4-6 hours:  If on nasal continuous positive airway pressure, respiratory therapy assess nares and determine need for appliance change or resting period  Outcome: Progressing  Flowsheets  Taken 3/26/2025 0307  Skin Integrity Remains Intact: Monitor for areas of redness and/or skin breakdown  Taken 3/26/2025 0258  Skin Integrity Remains Intact: Monitor for areas of redness and/or skin breakdown

## 2025-03-26 NOTE — DISCHARGE INSTR - COC
Continuity of Care Form    Patient Name: Yevgeniy Johnson   :  1960  MRN:  [id number]    Admit date:  3/15/2025  Discharge date:  ***    Code Status Order: Full Code   Advance Directives:    Date/Time Healthcare Directive Type of Healthcare Directive Copy in Chart Healthcare Agent Appointed Healthcare Agent's Name Healthcare Agent's Phone Number    25 1158 No, patient does not have an advance directive for healthcare treatment  --  --  --  --  --     25 0427 No, patient does not have an advance directive for healthcare treatment  --  --  --  --  --             Admitting Physician:  Tyrell Palm DO  PCP: Tanvir Levy MD    Discharging Nurse: ***  Discharging Hospital Unit/Room#: 0545/0545-A  Discharging Unit Phone Number: ***    Emergency Contact:   Extended Emergency Contact Information  Primary Emergency Contact: Dinora Hunter  Mobile Phone: [phone]  Relation: Friend   needed? No  Secondary Emergency Contact: Mathew Johnson  Home Phone: [phone]  Mobile Phone: [phone]  Relation: Child  Preferred language: English   needed? No    Past Surgical History:  Past Surgical History:   Procedure Laterality Date    CYSTOSCOPY N/A 2025    CYSTOSCOPY BILATERAL URETERAL CATHETER INSERTION performed by Edwin Mcgrath MD at [facility] OR    KNEE SURGERY Right     LUMBAR LAMINECTOMY      SMALL INTESTINE SURGERY N/A 2025    EXPLORATORY LAPAROTOMY/RIGHT COLECTOMY/ALTAMIRANO'S/DRAINAGE OF PELVIC ABCESS performed by Francis Barrett MD at [facility] OR       Immunization History:   Immunization History   Administered Date(s) Administered    COVID-19, PFIZER GRAY top, DO NOT Dilute, (age 12 y+), IM, 30 mcg/0.3 mL 2022    COVID-19, PFIZER PURPLE top, DILUTE for use, (age 12 y+), 30mcg/0.3mL 2021, 2021       Active Problems:  Patient Active Problem List   Diagnosis Code    Pelvic abscess in male (HCC) K65.1    Hypertension I10    COPD (chronic obstructive

## 2025-03-26 NOTE — PROGRESS NOTES
Internal Medicine Progress Note    Patient's name: Yevgeniy Johnson  : 1960  Chief complaints (on day of admission): Constipation (X3 days), Shortness of Breath, Nasal Congestion, and Abdominal Pain (Mid lower abd pain )  Admission date: 3/15/2025  Date of service: 3/26/2025   Room: [address] MED SURG/TELE  Primary care physician: Tanvir Levy MD  Reason for visit: Follow-up for colovesical fistula/diverticular abscess    Subjective  Yevgeniy is seen lying in bed awake and alert, in no distress. He reports that he is feeling better although still with some abdominal pain, asking for a pain pill. He reports that his throat and phlegm are much better now that the NG has been removed. He denies any nausea or vomiting, didn't eat much yesterday as he didn't want to push it. Discussed with patient need to continue to increase activity and go for a walk in the [place] later today -- he is agreeable. No other issues or concerns from nursing.    Review of Systems  Full 10 point review of systems negative unless mentioned above.    Hospital Medications  Current Facility-Administered Medications   Medication Dose Route Frequency Provider Last Rate Last Admin    ketorolac (TORADOL) injection 15 mg  15 mg IntraVENous Q6H PRN Francis Barrett MD        HYDROmorphone (DILAUDID) injection 1 mg  1 mg IntraVENous Q3H PRN Gale Ramirez DO   1 mg at 25 1625    enoxaparin Sodium (LOVENOX) injection 30 mg  30 mg SubCUTAneous BID César Noel MD   30 mg at 25    calcium carbonate (TUMS) chewable tablet 500 mg  500 mg Oral 4x Daily PRN Tyrell Palm, DO   500 mg at 25    0.9 % sodium chloride infusion   IntraVENous PRN César Noel MD   Stopped at 25 1602    acetaminophen (TYLENOL) tablet 650 mg  650 mg Oral 4 times per day César Noel MD   650 mg at 25 1117    [Held by provider] methocarbamol (ROBAXIN) tablet 1,000 mg  1,000 mg Oral 4x Daily César Noel MD    bases, respirations even and non-labored on room air  Heart: regular rate, regular rhythm, S1S2 present, no murmur noted  Abdomen: softly distended,mild generalized tenderness as expected, midline incision noted with dressing in place, normal bowel sounds, GARRETT noted, LLQ ostomy noted with stool noted, raphael intact draining clear yellow urine  Extremities: generalized weakness, no edema  Neurologic: following commands, speech is clear    Most Recent Labs  Lab Results   Component Value Date    WBC 8.1 03/26/2025    HGB 9.9 (L) 03/26/2025    HCT 31.0 (L) 03/26/2025     03/26/2025     03/26/2025    K 3.3 (L) 03/26/2025    CL 97 (L) 03/26/2025    CREATININE 0.5 (L) 03/26/2025    BUN 6 03/26/2025    CO2 24 03/26/2025    GLUCOSE 78 03/26/2025    ALT 19 03/26/2025    AST 19 03/26/2025    TSH 1.56 03/18/2025    LABA1C 5.7 (H) 03/18/2025       XR ABDOMEN FOR NG/OG/NE TUBE PLACEMENT   Final Result   Enteric tube is positioned with the tip in the gastric cardia. The proximal   port lies in the expected region of the distal esophagus. Recommend   advancement.         XR CHEST PORTABLE   Final Result   No acute process.         XR CHEST ABDOMEN NG PLACEMENT   Final Result   Enteric tube tip in the mid gastric body.         XR ABDOMEN (KUB) (SINGLE AP VIEW)   Final Result   1. Multiple mildly distended segments of small bowel are present which may   indicate adynamic ileus.   2. Stool and gas is seen in the colon.   3. No findings to suggest pneumoperitoneum.         CT ABDOMEN PELVIS W IV CONTRAST Additional Contrast? None   Final Result   1. There is a persistent extraluminal gas and fluid collection in the pre   vesicle space concerning for an abscess. This has increased in size when   compared the previous study.   2. There is a colovesical fistula that appears to communicate with the   sigmoid lumen.   3. There is diffuse mucosal thickening of the bladder concerning for cystitis.   4. Cholelithiasis.   5. Mild

## 2025-03-26 NOTE — CARE COORDINATION
Transition of Care-[facility] accepted. Pre-cert submitted this afternoon.  IV hydration stopped, await diet tolerance, advanced to low fiber today. Discharge plan is [facility].     Marisa MARIN, RN  [facility]

## 2025-03-26 NOTE — PROGRESS NOTES
ET Nurse: attempted to see patient for colostomy care lesson, patient off floor in [facility], left supplies at bedside.

## 2025-03-26 NOTE — PROGRESS NOTES
GENERAL SURGERY  DAILY PROGRESS NOTE  3/26/2025    Chief Complaint   Patient presents with    Constipation     X3 days    Shortness of Breath    Nasal Congestion    Abdominal Pain     Mid lower abd pain        Subjective:  Doing well. No N/V. No abdominal pain. Having stool OP.     Objective:  /80   Pulse 84   Temp 98.4 °F (36.9 °C) (Oral)   Resp 16   Ht 1.778 m (5' 10\")   Wt 103.9 kg (229 lb 0.9 oz)   SpO2 98%   BMI 32.87 kg/m²     Gen: aox3, nad  Cvs: rrr  Lungs: non labored  Abd: soft, moderately distended, appropriately tender, ostomy pink and viable, brown stool in bag,     Assessment/Plan:  64 y.o. male w colovesical fistula s/p exlap with takedown colovesicular & SB fistula, R ricky, Nona's, drainage of intra-abd abscess 3/19     Maintain raphael 2wks  C/w CLD - possibly advance to regular diet today   Prn pain/nausea control  IV abx; appreciate ID recs  Encourage OOB, ambulation  Lovenox for dvt pc  DC planning, PT/OT

## 2025-03-26 NOTE — PROGRESS NOTES
Occupational Therapy  OT BEDSIDE TREATMENT NOTE      Date:3/26/2025  Patient Name: Yevgeniy Johnson  MRN: [id number]  : 1960  Room: [address] [place], [place] [address]       Evaluating OT: Alissa Arango OTR/L   [id number]       Referring Provider:César Noel MD     Specific Provider Orders/Date:OT eval and treat 3/202/2025       Diagnosis:  Shortness of breath [R06.02]  Colovesical fistula [N32.1]  Pelvic abscess in male (HCC) [K65.1]   Procedure(s):  EXPLORATORY LAPAROTOMY  COMPLETE MOBILIZATION OF SPLENIC FLEXURE  TAKEDOWN OF COLOVESICULAR FISTULA  TAKEDOWN OF  ENTEROCOLONIC FISTULA  RIGHT HEMICOLECTOMY WITH PRIMARY STAPLED ANASTOMOSIS  SIGMOIDECTOMY WITH END COLOSTOMY (NISREEN'S)  DRAINAGE OF SUBCUTANEOUS, FASCIA, AND PELVIC ABCESS  CYSTOSCOPY WITH BILATERAL URETERAL CATHETER INSERTION    Pertinent Medical History: COPD, HTN, lumbar laminectomy      Precautions:  Fall Risk, GARRETT drain, colostomy, ,      Assessment of current deficits    [x] Functional mobility            []ADLs           [x] Strength                  []Cognition    [x] Functional transfers          [x] IADLs         [x] Safety Awareness   [x]Endurance    [] Fine Coordination                         [x] Balance      [] Vision/perception   []Sensation      []Gross Motor Coordination             [] ROM           [] Delirium                   [] Motor Control      OT PLAN OF CARE   OT POC based on physician orders, patient diagnosis and results of clinical assessment     Frequency/Duration  2-4 days/wk for 2 - 4weeks PRN   Specific OT Treatment Interventions to include:   ADL retraining/adapted techniques and AE recommendations to increase functional independence within precautions                    Energy conservation techniques to improve tolerance for selfcare routine   Functional transfer/mobility training/DME recommendations for increased independence, safety and fall prevention         Patient/family education to increase safety and functional independence  Independent  Standing - CGA/SBA  Independent   Activity Tolerance No SOB observed   Good  with ADL activity    Visual/  Perceptual Glasses: yes           UE ROM/strength  AROM present throughout   actively moving UEs  Tolerate UE therapeutic activity/exercises to increase strength/endurance for ADL/xfer activity     Education; safety awareness, importance of OOB activity     Comments: Upon arrival pt lying in bed . At end of session sitting in chair  all lines and tubes intact, call light within reach.       Pt has made fair +  progress towards set goals.   Continue with current plan of care    Time in: 1108  Time out:1125                Treatment Charges: Mins Units   Ther Ex  49450     Manual Therapy 33711     Thera Activities 05198 14 1   ADL/Home Mgt 16659     Neuro Re-ed 13791     Group Therapy      Orthotic manage/training  43003     Non-Billable Time     Total Timed Treatment            Alissa Arango OTR/L [id number]

## 2025-03-27 LAB
ALBUMIN SERPL-MCNC: 2.7 G/DL (ref 3.5–5.2)
ALP SERPL-CCNC: 63 U/L (ref 40–129)
ALT SERPL-CCNC: 20 U/L (ref 0–40)
ANION GAP SERPL CALCULATED.3IONS-SCNC: 13 MMOL/L (ref 7–16)
AST SERPL-CCNC: 15 U/L (ref 0–39)
BASOPHILS # BLD: 0.05 K/UL (ref 0–0.2)
BASOPHILS NFR BLD: 1 % (ref 0–2)
BILIRUB SERPL-MCNC: 0.2 MG/DL (ref 0–1.2)
BUN SERPL-MCNC: 5 MG/DL (ref 6–23)
CALCIUM SERPL-MCNC: 8.3 MG/DL (ref 8.6–10.2)
CHLORIDE SERPL-SCNC: 101 MMOL/L (ref 98–107)
CO2 SERPL-SCNC: 26 MMOL/L (ref 22–29)
CREAT SERPL-MCNC: 0.5 MG/DL (ref 0.7–1.2)
EOSINOPHIL # BLD: 0.25 K/UL (ref 0.05–0.5)
EOSINOPHILS RELATIVE PERCENT: 3 % (ref 0–6)
ERYTHROCYTE [DISTWIDTH] IN BLOOD BY AUTOMATED COUNT: 13.3 % (ref 11.5–15)
GFR, ESTIMATED: >90 ML/MIN/1.73M2
GLUCOSE SERPL-MCNC: 94 MG/DL (ref 74–99)
HCT VFR BLD AUTO: 31.7 % (ref 37–54)
HGB BLD-MCNC: 10.4 G/DL (ref 12.5–16.5)
IMM GRANULOCYTES # BLD AUTO: 0.03 K/UL (ref 0–0.58)
IMM GRANULOCYTES NFR BLD: 0 % (ref 0–5)
LYMPHOCYTES NFR BLD: 1.23 K/UL (ref 1.5–4)
LYMPHOCYTES RELATIVE PERCENT: 15 % (ref 20–42)
MAGNESIUM SERPL-MCNC: 1.8 MG/DL (ref 1.6–2.6)
MCH RBC QN AUTO: 29.7 PG (ref 26–35)
MCHC RBC AUTO-ENTMCNC: 32.8 G/DL (ref 32–34.5)
MCV RBC AUTO: 90.6 FL (ref 80–99.9)
MONOCYTES NFR BLD: 0.79 K/UL (ref 0.1–0.95)
MONOCYTES NFR BLD: 9 % (ref 2–12)
NEUTROPHILS NFR BLD: 72 % (ref 43–80)
NEUTS SEG NFR BLD: 6.11 K/UL (ref 1.8–7.3)
PLATELET # BLD AUTO: 447 K/UL (ref 130–450)
PMV BLD AUTO: 8.9 FL (ref 7–12)
POTASSIUM SERPL-SCNC: 3.1 MMOL/L (ref 3.5–5)
PROT SERPL-MCNC: 5.4 G/DL (ref 6.4–8.3)
RBC # BLD AUTO: 3.5 M/UL (ref 3.8–5.8)
SODIUM SERPL-SCNC: 140 MMOL/L (ref 132–146)
SURGICAL PATHOLOGY REPORT: NORMAL
WBC OTHER # BLD: 8.5 K/UL (ref 4.5–11.5)

## 2025-03-27 PROCEDURE — 6360000002 HC RX W HCPCS: Performed by: STUDENT IN AN ORGANIZED HEALTH CARE EDUCATION/TRAINING PROGRAM

## 2025-03-27 PROCEDURE — 2500000003 HC RX 250 WO HCPCS: Performed by: STUDENT IN AN ORGANIZED HEALTH CARE EDUCATION/TRAINING PROGRAM

## 2025-03-27 PROCEDURE — 6370000000 HC RX 637 (ALT 250 FOR IP): Performed by: STUDENT IN AN ORGANIZED HEALTH CARE EDUCATION/TRAINING PROGRAM

## 2025-03-27 PROCEDURE — 6370000000 HC RX 637 (ALT 250 FOR IP): Performed by: NURSE PRACTITIONER

## 2025-03-27 PROCEDURE — 80053 COMPREHEN METABOLIC PANEL: CPT

## 2025-03-27 PROCEDURE — 1200000000 HC SEMI PRIVATE

## 2025-03-27 PROCEDURE — 6360000002 HC RX W HCPCS

## 2025-03-27 PROCEDURE — 94640 AIRWAY INHALATION TREATMENT: CPT

## 2025-03-27 PROCEDURE — 36415 COLL VENOUS BLD VENIPUNCTURE: CPT

## 2025-03-27 PROCEDURE — 85025 COMPLETE CBC W/AUTO DIFF WBC: CPT

## 2025-03-27 PROCEDURE — 83735 ASSAY OF MAGNESIUM: CPT

## 2025-03-27 RX ORDER — HYDROXYZINE PAMOATE 25 MG/1
25 CAPSULE ORAL EVERY 6 HOURS PRN
DISCHARGE
Start: 2025-03-27 | End: 2025-04-10

## 2025-03-27 RX ADMIN — OXYCODONE HYDROCHLORIDE 10 MG: 5 TABLET ORAL at 21:29

## 2025-03-27 RX ADMIN — ARFORMOTEROL TARTRATE 15 MCG: 15 SOLUTION RESPIRATORY (INHALATION) at 18:53

## 2025-03-27 RX ADMIN — TAMSULOSIN HYDROCHLORIDE 0.4 MG: 0.4 CAPSULE ORAL at 08:48

## 2025-03-27 RX ADMIN — POTASSIUM CHLORIDE 40 MEQ: 20 TABLET, EXTENDED RELEASE ORAL at 09:31

## 2025-03-27 RX ADMIN — HYDROMORPHONE HYDROCHLORIDE 1 MG: 1 INJECTION, SOLUTION INTRAMUSCULAR; INTRAVENOUS; SUBCUTANEOUS at 08:51

## 2025-03-27 RX ADMIN — ACETAMINOPHEN 650 MG: 325 TABLET ORAL at 05:30

## 2025-03-27 RX ADMIN — ENOXAPARIN SODIUM 30 MG: 100 INJECTION SUBCUTANEOUS at 08:48

## 2025-03-27 RX ADMIN — SODIUM CHLORIDE, PRESERVATIVE FREE 10 ML: 5 INJECTION INTRAVENOUS at 08:52

## 2025-03-27 RX ADMIN — HYDROMORPHONE HYDROCHLORIDE 1 MG: 1 INJECTION, SOLUTION INTRAMUSCULAR; INTRAVENOUS; SUBCUTANEOUS at 14:23

## 2025-03-27 RX ADMIN — IPRATROPIUM BROMIDE 0.5 MG: 0.5 SOLUTION RESPIRATORY (INHALATION) at 07:54

## 2025-03-27 RX ADMIN — BUPRENORPHINE HYDROCHLORIDE AND NALOXONE HYDROCHLORIDE DIHYDRATE 1 TABLET: 8; 2 TABLET SUBLINGUAL at 20:10

## 2025-03-27 RX ADMIN — MIRTAZAPINE 30 MG: 15 TABLET, FILM COATED ORAL at 20:10

## 2025-03-27 RX ADMIN — ARFORMOTEROL TARTRATE 15 MCG: 15 SOLUTION RESPIRATORY (INHALATION) at 07:54

## 2025-03-27 RX ADMIN — OXYCODONE HYDROCHLORIDE 10 MG: 5 TABLET ORAL at 05:29

## 2025-03-27 RX ADMIN — ONDANSETRON 4 MG: 2 INJECTION INTRAMUSCULAR; INTRAVENOUS at 14:35

## 2025-03-27 RX ADMIN — ACETAMINOPHEN 650 MG: 325 TABLET ORAL at 18:11

## 2025-03-27 RX ADMIN — HYDROMORPHONE HYDROCHLORIDE 1 MG: 1 INJECTION, SOLUTION INTRAMUSCULAR; INTRAVENOUS; SUBCUTANEOUS at 18:12

## 2025-03-27 RX ADMIN — OXYCODONE HYDROCHLORIDE 10 MG: 5 TABLET ORAL at 12:03

## 2025-03-27 RX ADMIN — SODIUM CHLORIDE, PRESERVATIVE FREE 10 ML: 5 INJECTION INTRAVENOUS at 20:12

## 2025-03-27 RX ADMIN — ENOXAPARIN SODIUM 30 MG: 100 INJECTION SUBCUTANEOUS at 20:30

## 2025-03-27 RX ADMIN — BUPRENORPHINE HYDROCHLORIDE AND NALOXONE HYDROCHLORIDE DIHYDRATE 2 TABLET: 8; 2 TABLET SUBLINGUAL at 08:48

## 2025-03-27 RX ADMIN — ACETAMINOPHEN 650 MG: 325 TABLET ORAL at 12:03

## 2025-03-27 RX ADMIN — IPRATROPIUM BROMIDE 0.5 MG: 0.5 SOLUTION RESPIRATORY (INHALATION) at 18:53

## 2025-03-27 RX ADMIN — METHOCARBAMOL TABLETS 1000 MG: 500 TABLET, COATED ORAL at 20:10

## 2025-03-27 ASSESSMENT — PAIN DESCRIPTION - DESCRIPTORS
DESCRIPTORS: ACHING;SHOOTING
DESCRIPTORS: ACHING
DESCRIPTORS: ACHING
DESCRIPTORS: BURNING;SHARP

## 2025-03-27 ASSESSMENT — PAIN DESCRIPTION - LOCATION
LOCATION: ABDOMEN;BACK
LOCATION: ABDOMEN
LOCATION: ABDOMEN;BACK
LOCATION: ABDOMEN
LOCATION: ABDOMEN

## 2025-03-27 ASSESSMENT — PAIN SCALES - GENERAL
PAINLEVEL_OUTOF10: 8
PAINLEVEL_OUTOF10: 8
PAINLEVEL_OUTOF10: 7
PAINLEVEL_OUTOF10: 0
PAINLEVEL_OUTOF10: 8
PAINLEVEL_OUTOF10: 0
PAINLEVEL_OUTOF10: 9

## 2025-03-27 ASSESSMENT — PAIN DESCRIPTION - FREQUENCY: FREQUENCY: INTERMITTENT

## 2025-03-27 ASSESSMENT — PAIN DESCRIPTION - ORIENTATION
ORIENTATION: LOWER
ORIENTATION: LOWER

## 2025-03-27 ASSESSMENT — PAIN DESCRIPTION - PAIN TYPE: TYPE: SURGICAL PAIN;CHRONIC PAIN

## 2025-03-27 ASSESSMENT — PAIN - FUNCTIONAL ASSESSMENT: PAIN_FUNCTIONAL_ASSESSMENT: ACTIVITIES ARE NOT PREVENTED

## 2025-03-27 ASSESSMENT — PAIN DESCRIPTION - ONSET: ONSET: ON-GOING

## 2025-03-27 NOTE — PLAN OF CARE
Problem: Discharge Planning  Goal: Discharge to home or other facility with appropriate resources  Outcome: Progressing     Problem: Pain  Goal: Verbalizes/displays adequate comfort level or baseline comfort level  Outcome: Progressing     Problem: ABCDS Injury Assessment  Goal: Absence of physical injury  Outcome: Progressing     Problem: Safety - Adult  Goal: Free from fall injury  Outcome: Progressing     Problem: Nutrition Deficit:  Goal: Optimize nutritional status  Outcome: Progressing     Problem: Skin/Tissue Integrity  Goal: Skin integrity remains intact  Description: 1.  Monitor for areas of redness and/or skin breakdown  2.  Assess vascular access sites hourly  3.  Every 4-6 hours minimum:  Change oxygen saturation probe site  4.  Every 4-6 hours:  If on nasal continuous positive airway pressure, respiratory therapy assess nares and determine need for appliance change or resting period  Outcome: Progressing

## 2025-03-27 NOTE — PROGRESS NOTES
GENERAL SURGERY  DAILY PROGRESS NOTE  3/27/2025    Chief Complaint   Patient presents with    Constipation     X3 days    Shortness of Breath    Nasal Congestion    Abdominal Pain     Mid lower abd pain        Subjective:  Says he is feeling great, no n/v. Tolerating diet. Still having BM's. No complaints this morning.     Objective:  /68   Pulse 95   Temp 97.9 °F (36.6 °C) (Oral)   Resp 16   Ht 1.778 m (5' 10\")   Wt 103.7 kg (228 lb 9.9 oz)   SpO2 94%   BMI 32.80 kg/m²     Gen: aox3, nad  Cvs: rrr  Lungs: non labored  Abd: soft, moderately distended, appropriately tender, ostomy pink and viable, brown stool in bag, midline incision w some purulent appearing drainage from inferior aspect    Assessment/Plan:  64 y.o. male w colovesical fistula s/p exlap with takedown colovesicular & SB fistula, R ricky, Nona's, drainage of intra-abd abscess 3/19     CT cysto yesterday still w small amt extrav, will maintain raphael  Prn pain/nausea control  IV abx; appreciate ID recs  Encourage OOB, ambulation  Lovenox for dvt pc  DC planning, PT/OT 16/24 [facility]  Will need to dc with donavon Espinoza MD

## 2025-03-27 NOTE — PROGRESS NOTES
Surgery has not signed off yet for discharge, Dr Palm updated that patient is concerned about drainage around midline site, discharge moved to Friday. Surgery also updated.

## 2025-03-27 NOTE — PROGRESS NOTES
Internal Medicine Progress Note    Patient's name: Yevgeniy Johnson  : 1960  Chief complaints (on day of admission): Constipation (X3 days), Shortness of Breath, Nasal Congestion, and Abdominal Pain (Mid lower abd pain )  Admission date: 3/15/2025  Date of service: 3/27/2025   Room: [address] MED SURG/TELE  Primary care physician: Tanvir Levy MD  Reason for visit: Follow-up for colovesical fistula/diverticular abscess    Subjective  Yevgeniy is seen lying in bed asleep, in no distress.  He does easily awaken to voice.  He reports being tired but otherwise feels fairly well.  He expresses that he does have some pain mostly in the lower part of his abdomen but denies any nausea or vomiting.  He reports that he did get up and walk yesterday, feels like he did good.  He did eat some eggs for breakfast yesterday and tolerated his diet throughout the day without any issues, only ate about half of each meal as he did not want to \"overdo it\".  No other issues or concerns from nursing.    Review of Systems  Full 10 point review of systems negative unless mentioned above.    Hospital Medications  Current Facility-Administered Medications   Medication Dose Route Frequency Provider Last Rate Last Admin    HYDROmorphone (DILAUDID) injection 1 mg  1 mg IntraVENous Q3H PRN Gale Ramirez DO   1 mg at 25 0919    enoxaparin Sodium (LOVENOX) injection 30 mg  30 mg SubCUTAneous BID César Noel MD   30 mg at 25    calcium carbonate (TUMS) chewable tablet 500 mg  500 mg Oral 4x Daily PRN Tyrell Palm DO   500 mg at 25    0.9 % sodium chloride infusion   IntraVENous PRN César Noel MD   Stopped at 25 1602    acetaminophen (TYLENOL) tablet 650 mg  650 mg Oral 4 times per day César Noel MD   650 mg at 25 0530    methocarbamol (ROBAXIN) tablet 1,000 mg  1,000 mg Oral 4x Daily Sofia Stone APRN - CNP   1,000 mg at 25    oxyCODONE (ROXICODONE)  noted, LLQ ostomy noted with stool noted, raphael intact draining clear yellow urine  Extremities: generalized weakness, no edema  Neurologic: following commands, speech is clear    Most Recent Labs  Lab Results   Component Value Date    WBC 8.1 03/26/2025    HGB 9.9 (L) 03/26/2025    HCT 31.0 (L) 03/26/2025     03/26/2025     03/26/2025    K 3.3 (L) 03/26/2025    CL 97 (L) 03/26/2025    CREATININE 0.5 (L) 03/26/2025    BUN 6 03/26/2025    CO2 24 03/26/2025    GLUCOSE 78 03/26/2025    ALT 19 03/26/2025    AST 19 03/26/2025    TSH 1.56 03/18/2025    LABA1C 5.7 (H) 03/18/2025       CT CYSTOGRAM W CONTRAST   Final Result   1.  Postop changes of the urinary bladder, small amount of extraluminal   contrast extravasation located in the left pericystic fat, no communication   with adjacent small bowel loops.      2.  No evidence of pelvic abscess or pneumoperitoneum.         XR ABDOMEN FOR NG/OG/NE TUBE PLACEMENT   Final Result   Enteric tube is positioned with the tip in the gastric cardia. The proximal   port lies in the expected region of the distal esophagus. Recommend   advancement.         XR CHEST PORTABLE   Final Result   No acute process.         XR CHEST ABDOMEN NG PLACEMENT   Final Result   Enteric tube tip in the mid gastric body.         XR ABDOMEN (KUB) (SINGLE AP VIEW)   Final Result   1. Multiple mildly distended segments of small bowel are present which may   indicate adynamic ileus.   2. Stool and gas is seen in the colon.   3. No findings to suggest pneumoperitoneum.         CT ABDOMEN PELVIS W IV CONTRAST Additional Contrast? None   Final Result   1. There is a persistent extraluminal gas and fluid collection in the pre   vesicle space concerning for an abscess. This has increased in size when   compared the previous study.   2. There is a colovesical fistula that appears to communicate with the   sigmoid lumen.   3. There is diffuse mucosal thickening of the bladder concerning for cystitis.

## 2025-03-27 NOTE — PROGRESS NOTES
Comprehensive Nutrition Assessment    Type and Reason for Visit:  Reassess    Nutrition Recommendations/Plan:   Continue current diet and Marcelino  Will discontinue Gelatein and start Magic Cup BID per pt preference  Replace & monitor lytes prn  Continue inpatient monitoring      Malnutrition Assessment:  Malnutrition Status:  At risk for malnutrition (03/20/25 7973)    Context:  Acute Illness     Findings of the 6 clinical characteristics of malnutrition:  Energy Intake:  75% or less of estimated energy requirements for 7 or more days  Weight Loss:  Unable to assess (no wt hx in EMR <1 yr)     Body Fat Loss:  No body fat loss     Muscle Mass Loss:  No muscle mass loss    Fluid Accumulation:  No fluid accumulation     Strength:  Not Performed    Nutrition Assessment:    Post-op ileus resolved, NGT removed, diet advanced to low fiber and pt tolerating diet. Plan for pt to discharge to SNF. Will modify ONS per discussion w/ pt.    Nutrition Related Findings:    A&O X4, -12.9L, BLE +1 edema, abd rounded, +BS, colostomy +stool, GARRETT drain, K+ 3.1   Wound Type: Surgical Incision       Current Nutrition Intake & Therapies:    Average Meal Intake: 26-50% (half of meals)  Average Supplements Intake:  (will drink Marcelino now that he knows what it is, pt does not want Gelatein)  ADULT ORAL NUTRITION SUPPLEMENT; Breakfast, Dinner; Wound Healing Oral Supplement  ADULT ORAL NUTRITION SUPPLEMENT; Lunch, Dinner; Fortified Gelatin Oral Supplement  ADULT DIET; Regular; Low Fiber; No Carbonated Beverages    Anthropometric Measures:  Height: 177.8 cm (5' 10\")  Ideal Body Weight (IBW): 166 lbs (75 kg)    Admission Body Weight: 97.5 kg (214 lb 15.2 oz) (3/15 bedscale)  Current Body Weight: 103.7 kg (228 lb 9.9 oz) (3/27), 125.3 % IBW. Weight Source: Bed scale  Current BMI (kg/m2): 32.8  Usual Body Weight:  (no wt hx in EMR <1 yr)        Weight Adjustment For: No Adjustment                 BMI Categories: Obese Class 1 (BMI

## 2025-03-27 NOTE — CARE COORDINATION
Transition of Care-[facility] accepted. Pre-cert was submitted yesterday, anticipate auth approval today. Patient was advanced to Reg Diet yesterday. Await insurance auth and clearance from Gen Surgery. Discharge plan is [facility]. LIZETT, HENs and transport form created.    Marisa MARIN, RN  [facility]

## 2025-03-28 VITALS
DIASTOLIC BLOOD PRESSURE: 67 MMHG | HEART RATE: 76 BPM | SYSTOLIC BLOOD PRESSURE: 118 MMHG | BODY MASS INDEX: 32.7 KG/M2 | RESPIRATION RATE: 18 BRPM | TEMPERATURE: 98.1 F | OXYGEN SATURATION: 90 % | HEIGHT: 70 IN | WEIGHT: 228.4 LBS

## 2025-03-28 LAB
ALBUMIN SERPL-MCNC: 2.7 G/DL (ref 3.5–5.2)
ALP SERPL-CCNC: 65 U/L (ref 40–129)
ALT SERPL-CCNC: 17 U/L (ref 0–40)
ANION GAP SERPL CALCULATED.3IONS-SCNC: 10 MMOL/L (ref 7–16)
AST SERPL-CCNC: 13 U/L (ref 0–39)
BASOPHILS # BLD: 0.05 K/UL (ref 0–0.2)
BASOPHILS NFR BLD: 1 % (ref 0–2)
BILIRUB SERPL-MCNC: 0.2 MG/DL (ref 0–1.2)
BUN SERPL-MCNC: 7 MG/DL (ref 6–23)
CALCIUM SERPL-MCNC: 8.8 MG/DL (ref 8.6–10.2)
CHLORIDE SERPL-SCNC: 105 MMOL/L (ref 98–107)
CO2 SERPL-SCNC: 28 MMOL/L (ref 22–29)
CREAT SERPL-MCNC: 0.6 MG/DL (ref 0.7–1.2)
EOSINOPHIL # BLD: 0.24 K/UL (ref 0.05–0.5)
EOSINOPHILS RELATIVE PERCENT: 4 % (ref 0–6)
ERYTHROCYTE [DISTWIDTH] IN BLOOD BY AUTOMATED COUNT: 13.5 % (ref 11.5–15)
GFR, ESTIMATED: >90 ML/MIN/1.73M2
GLUCOSE SERPL-MCNC: 119 MG/DL (ref 74–99)
HCT VFR BLD AUTO: 32.8 % (ref 37–54)
HGB BLD-MCNC: 10.4 G/DL (ref 12.5–16.5)
IMM GRANULOCYTES # BLD AUTO: <0.03 K/UL (ref 0–0.58)
IMM GRANULOCYTES NFR BLD: 0 % (ref 0–5)
LYMPHOCYTES NFR BLD: 1.6 K/UL (ref 1.5–4)
LYMPHOCYTES RELATIVE PERCENT: 27 % (ref 20–42)
MCH RBC QN AUTO: 29.3 PG (ref 26–35)
MCHC RBC AUTO-ENTMCNC: 31.7 G/DL (ref 32–34.5)
MCV RBC AUTO: 92.4 FL (ref 80–99.9)
MONOCYTES NFR BLD: 0.63 K/UL (ref 0.1–0.95)
MONOCYTES NFR BLD: 11 % (ref 2–12)
NEUTROPHILS NFR BLD: 57 % (ref 43–80)
NEUTS SEG NFR BLD: 3.3 K/UL (ref 1.8–7.3)
PLATELET # BLD AUTO: 464 K/UL (ref 130–450)
PMV BLD AUTO: 8.9 FL (ref 7–12)
POTASSIUM SERPL-SCNC: 3.6 MMOL/L (ref 3.5–5)
PROT SERPL-MCNC: 5.7 G/DL (ref 6.4–8.3)
RBC # BLD AUTO: 3.55 M/UL (ref 3.8–5.8)
SODIUM SERPL-SCNC: 143 MMOL/L (ref 132–146)
WBC OTHER # BLD: 5.8 K/UL (ref 4.5–11.5)

## 2025-03-28 PROCEDURE — 2500000003 HC RX 250 WO HCPCS: Performed by: STUDENT IN AN ORGANIZED HEALTH CARE EDUCATION/TRAINING PROGRAM

## 2025-03-28 PROCEDURE — 94640 AIRWAY INHALATION TREATMENT: CPT

## 2025-03-28 PROCEDURE — 6370000000 HC RX 637 (ALT 250 FOR IP): Performed by: STUDENT IN AN ORGANIZED HEALTH CARE EDUCATION/TRAINING PROGRAM

## 2025-03-28 PROCEDURE — 6370000000 HC RX 637 (ALT 250 FOR IP): Performed by: NURSE PRACTITIONER

## 2025-03-28 PROCEDURE — 80053 COMPREHEN METABOLIC PANEL: CPT

## 2025-03-28 PROCEDURE — 6360000002 HC RX W HCPCS: Performed by: STUDENT IN AN ORGANIZED HEALTH CARE EDUCATION/TRAINING PROGRAM

## 2025-03-28 PROCEDURE — 85025 COMPLETE CBC W/AUTO DIFF WBC: CPT

## 2025-03-28 RX ORDER — METHOCARBAMOL 750 MG/1
750 TABLET, FILM COATED ORAL 4 TIMES DAILY
DISCHARGE
Start: 2025-03-28 | End: 2025-04-07

## 2025-03-28 RX ORDER — OXYCODONE AND ACETAMINOPHEN 5; 325 MG/1; MG/1
1 TABLET ORAL EVERY 6 HOURS PRN
Status: SHIPPED | DISCHARGE
Start: 2025-03-28 | End: 2025-04-04

## 2025-03-28 RX ADMIN — OXYCODONE HYDROCHLORIDE 10 MG: 5 TABLET ORAL at 13:04

## 2025-03-28 RX ADMIN — ENOXAPARIN SODIUM 30 MG: 100 INJECTION SUBCUTANEOUS at 09:26

## 2025-03-28 RX ADMIN — OXYCODONE HYDROCHLORIDE 10 MG: 5 TABLET ORAL at 05:26

## 2025-03-28 RX ADMIN — TAMSULOSIN HYDROCHLORIDE 0.4 MG: 0.4 CAPSULE ORAL at 09:26

## 2025-03-28 RX ADMIN — IPRATROPIUM BROMIDE 0.5 MG: 0.5 SOLUTION RESPIRATORY (INHALATION) at 08:57

## 2025-03-28 RX ADMIN — BUPRENORPHINE HYDROCHLORIDE AND NALOXONE HYDROCHLORIDE DIHYDRATE 2 TABLET: 8; 2 TABLET SUBLINGUAL at 09:25

## 2025-03-28 RX ADMIN — OXYCODONE HYDROCHLORIDE 10 MG: 5 TABLET ORAL at 09:25

## 2025-03-28 RX ADMIN — SODIUM CHLORIDE, PRESERVATIVE FREE 10 ML: 5 INJECTION INTRAVENOUS at 09:26

## 2025-03-28 RX ADMIN — ARFORMOTEROL TARTRATE 15 MCG: 15 SOLUTION RESPIRATORY (INHALATION) at 08:57

## 2025-03-28 RX ADMIN — ACETAMINOPHEN 650 MG: 325 TABLET ORAL at 05:26

## 2025-03-28 ASSESSMENT — PAIN DESCRIPTION - ORIENTATION
ORIENTATION: MID;LOWER
ORIENTATION: LOWER

## 2025-03-28 ASSESSMENT — PAIN DESCRIPTION - DESCRIPTORS: DESCRIPTORS: ACHING

## 2025-03-28 ASSESSMENT — PAIN - FUNCTIONAL ASSESSMENT: PAIN_FUNCTIONAL_ASSESSMENT: ACTIVITIES ARE NOT PREVENTED

## 2025-03-28 ASSESSMENT — PAIN DESCRIPTION - LOCATION
LOCATION: ABDOMEN;BACK
LOCATION: ABDOMEN
LOCATION: ABDOMEN

## 2025-03-28 ASSESSMENT — PAIN SCALES - GENERAL
PAINLEVEL_OUTOF10: 6
PAINLEVEL_OUTOF10: 7
PAINLEVEL_OUTOF10: 0
PAINLEVEL_OUTOF10: 7
PAINLEVEL_OUTOF10: 6

## 2025-03-28 NOTE — DISCHARGE SUMMARY
Internal Medicine Discharge Summary    NAME: Yevgeniy Johnson :  1960  MRN:  [id number] PCP:Tanvir Levy MD    ADMITTED: 3/15/2025   DISCHARGED: 3/28/2025  1:39 PM    ADMITTING PHYSICIAN: Tyrell Palm DO    PCP: Tanvir Levy MD    CONSULTANT(S):   IP CONSULT TO GENERAL SURGERY  IP CONSULT TO INTERNAL MEDICINE  IP CONSULT TO INFECTIOUS DISEASES  IP CONSULT TO CARDIOLOGY  IP CONSULT TO HOME CARE NEEDS  IP CONSULT TO DIETITIAN     ADMITTING DIAGNOSIS:   Shortness of breath [R06.02]  Colovesical fistula [N32.1]  Pelvic abscess in male (HCC) [K65.1]     Please see H&P for further details    DISCHARGE DIAGNOSES:   Active Hospital Problems    Diagnosis     COPD (chronic obstructive pulmonary disease) (HCC) [J44.9]     Pelvic abscess in male (HCC) [K65.1]     Hypertension [I10]        BRIEF HISTORY OF PRESENT ILLNESS: Yevgeniy Johnson is a 64 y.o. male patient of Tanvir Levy MD who  has a past medical history of COPD (chronic obstructive pulmonary disease) (HCC) and Hypertension. who originally had concerns including Constipation (X3 days), Shortness of Breath, Nasal Congestion, and Abdominal Pain (Mid lower abd pain ). at presentation on 3/15/2025, and was found to have Shortness of breath [R06.02]  Colovesical fistula [N32.1]  Pelvic abscess in male (HCC) [K65.1] after workup.    Please see H&P for further details.    HOSPITAL COURSE:   The patient presented to the hospital with the chief complaint of Constipation (X3 days), Shortness of Breath, Nasal Congestion, and Abdominal Pain (Mid lower abd pain )  . The patient was admitted to the hospital.     Colovesical Fistula with Diverticular Abscess  CT abdomen/pelvis noted -- originally scheduled for OR next month  S/p takedown of colovesicular fistula and SB fistula with right hemicolectomy, Kang's and drainage of intra-abdominal abscess 3/19  Wound with some dehiscence and purulent drainage noted -- surgery aware, additional staples  study. 2. There is a colovesical fistula that appears to communicate with the sigmoid lumen. 3. There is diffuse mucosal thickening of the bladder concerning for cystitis. 4. Cholelithiasis. 5. Mild central duct dilation. 6. Diverticulosis. 7. Small fixed hiatus hernia. 8. There are ground-glass opacities within the right middle lobe with bronchiectatic change and tree-in-bud opacities suggests inflammatory change. This is not significantly improved when compared the previous study.     XR CHEST (2 VW)  Result Date: 3/15/2025  EXAMINATION: TWO XRAY VIEWS OF THE CHEST 3/15/2025 3:30 pm COMPARISON: None. HISTORY: ORDERING SYSTEM PROVIDED HISTORY: Shortness of Breath TECHNOLOGIST PROVIDED HISTORY: Reason for exam:->Shortness of Breath FINDINGS: Two views of the chest demonstrate satisfactory expansion of the lungs which are free of infiltrate or effusion.  There are mild increased markings present.  The cardiac silhouette is normal and there is no evidence of a pneumothorax.     Mild increased markings present. No acute cardiopulmonary process.     CT ABDOMEN PELVIS W IV CONTRAST Additional Contrast? None  Result Date: 3/2/2025  EXAMINATION: CT OF THE ABDOMEN AND PELVIS WITH CONTRAST 3/2/2025 12:12 pm TECHNIQUE: CT of the abdomen and pelvis was performed with the administration of intravenous contrast. Multiplanar reformatted images are provided for review. Automated exposure control, iterative reconstruction, and/or weight based adjustment of the mA/kV was utilized to reduce the radiation dose to as low as reasonably achievable. COMPARISON: None. HISTORY: ORDERING SYSTEM PROVIDED HISTORY: h/o fistula, increse freq in fecaluria TECHNOLOGIST PROVIDED HISTORY: Reason for exam:->h/o fistula, increse freq in fecaluria Additional Contrast?->None Decision Support Exception - unselect if not a suspected or confirmed emergency medical condition->Emergency Medical Condition (MA) FINDINGS: Lower Chest: Small hiatal hernia.  removed. Continue taking this medication, and follow the directions you see here.     traZODone 100 MG tablet  Commonly known as: DESYREL  What changed: Another medication with the same name was removed. Continue taking this medication, and follow the directions you see here.            CONTINUE taking these medications      albuterol (2.5 MG/3ML) 0.083% nebulizer solution  Commonly known as: PROVENTIL     Anoro Ellipta 62.5-25 MCG/INH inhaler  Generic drug: umeclidinium-vilanterol     * buprenorphine-naloxone 8-2 MG Subl SL tablet  Commonly known as: SUBOXONE     * buprenorphine-naloxone 8-2 MG Subl SL tablet  Commonly known as: SUBOXONE     ondansetron 4 MG disintegrating tablet  Commonly known as: Zofran ODT  Take 1 tablet by mouth 3 times daily as needed for Nausea or Vomiting     tamsulosin 0.4 MG capsule  Commonly known as: FLOMAX  Take 1 capsule by mouth daily           * This list has 2 medication(s) that are the same as other medications prescribed for you. Read the directions carefully, and ask your doctor or other care provider to review them with you.                STOP taking these medications      escitalopram 10 MG tablet  Commonly known as: LEXAPRO     naproxen 500 MG tablet  Commonly known as: NAPROSYN               Where to Get Your Medications        You can get these medications from any pharmacy    Bring a paper prescription for each of these medications  oxyCODONE-acetaminophen 5-325 MG per tablet       Information about where to get these medications is not yet available    Ask your nurse or doctor about these medications  hydrOXYzine pamoate 25 MG capsule  methocarbamol 750 MG tablet           INTERNAL MEDICINE INSTRUCTIONS:  Follow-up with primary care physician as directed in discharge paperwork.  Please review results of imaging studies with PCP.  Follow-up with consultants as directed by them.  If recurrence or worsening of symptoms go to the ED or call primary care physician.  Diet:

## 2025-03-28 NOTE — PROGRESS NOTES
GENERAL SURGERY  DAILY PROGRESS NOTE  3/28/2025    Chief Complaint   Patient presents with    Constipation     X3 days    Shortness of Breath    Nasal Congestion    Abdominal Pain     Mid lower abd pain        Subjective:  Says he is feeling great, no n/v. Tolerating diet. Still having BM's.Still w a bit of drainage from midline.     Objective:  /68   Pulse 87   Temp 98.2 °F (36.8 °C) (Oral)   Resp 16   Ht 1.778 m (5' 10\")   Wt 103.6 kg (228 lb 6.3 oz)   SpO2 95%   BMI 32.77 kg/m²     Gen: aox3, nad  Cvs: rrr  Lungs: non labored  Abd: soft, moderately distended, appropriately tender, ostomy pink and viable, brown stool in bag, midline incision w some purulent appearing drainage from inferior aspect, several more staples removed from inferior aspect of incision    Assessment/Plan:  64 y.o. male w colovesical fistula s/p exlap with takedown colovesicular & SB fistula, R ricky, Nona's, drainage of intra-abd abscess 3/19     maintain raphael  Prn pain/nausea control  IV abx; appreciate ID recs  Encourage OOB, ambulation  Lovenox for dvt pc  DC planning, PT/OT 16/[facility]  Will need to dc with raphael   Ok to discharge from surgeryPOV    Angel Espinoza MD

## 2025-03-28 NOTE — PROGRESS NOTES
Occupational Therapy  Attempted this AM.  Pt not ready to get OOB at this time.  States he need to allow his food to digest and receive pain medication later.  Will attempt another time.   Siri ROA/DEZ [id number]

## 2025-03-28 NOTE — PLAN OF CARE
Problem: Discharge Planning  Goal: Discharge to home or other facility with appropriate resources  Outcome: Progressing     Problem: Pain  Goal: Verbalizes/displays adequate comfort level or baseline comfort level  Outcome: Progressing     Problem: ABCDS Injury Assessment  Goal: Absence of physical injury  Outcome: Progressing     Problem: Safety - Adult  Goal: Free from fall injury  Outcome: Progressing     Problem: Nutrition Deficit:  Goal: Optimize nutritional status  Outcome: Progressing  Flowsheets (Taken 3/27/2025 1394 by D'Amico, Miranda, RD, LD)  Nutrient intake appropriate for improving, restoring, or maintaining nutritional needs:   Assess nutritional status and recommend course of action   Monitor oral intake, labs, and treatment plans   Recommend appropriate diets, oral nutritional supplements, and vitamin/mineral supplements     Problem: Skin/Tissue Integrity  Goal: Skin integrity remains intact  Description: 1.  Monitor for areas of redness and/or skin breakdown  2.  Assess vascular access sites hourly  3.  Every 4-6 hours minimum:  Change oxygen saturation probe site  4.  Every 4-6 hours:  If on nasal continuous positive airway pressure, respiratory therapy assess nares and determine need for appliance change or resting period  Outcome: Progressing

## 2025-03-28 NOTE — PROGRESS NOTES
Internal Medicine Progress Note    Patient's name: Yevgeniy Johnson  : 1960  Chief complaints (on day of admission): Constipation (X3 days), Shortness of Breath, Nasal Congestion, and Abdominal Pain (Mid lower abd pain )  Admission date: 3/15/2025  Date of service: 3/28/2025   Room: [address] MED SURG/TELE  Primary care physician: Tanvir Levy MD  Reason for visit: Follow-up for colovesical fistula/diverticular abscess    Subjective  Yevgeniy is seen lying in bed asleep, in no distress.  He does easily awaken to voice.  He reports being tired but overall feels \"okay\".  He does have pain in his lower abdomen but feels like the pain medications are effective.  He reports that he did have a little nausea yesterday but thinks it was more anxiety related.  Abdominal incision does have some dehiscence with a little bit of purulent drainage, additional staples removed this morning per surgery with plans to continue local wound care.  No other issues or concerns from nursing.    Review of Systems  Full 10 point review of systems negative unless mentioned above.    Hospital Medications  Current Facility-Administered Medications   Medication Dose Route Frequency Provider Last Rate Last Admin    enoxaparin Sodium (LOVENOX) injection 30 mg  30 mg SubCUTAneous BID César Noel MD   30 mg at 25    calcium carbonate (TUMS) chewable tablet 500 mg  500 mg Oral 4x Daily PRN Tyrell Palm DO   500 mg at 25    0.9 % sodium chloride infusion   IntraVENous PRN César Noel MD   Stopped at 25 1602    acetaminophen (TYLENOL) tablet 650 mg  650 mg Oral 4 times per day César Noel MD   650 mg at 25 0526    methocarbamol (ROBAXIN) tablet 1,000 mg  1,000 mg Oral 4x Daily Sofia Stone APRN - CNP   1,000 mg at 25    oxyCODONE (ROXICODONE) immediate release tablet 5 mg  5 mg Oral Q4H PRN César Noel MD        Or    oxyCODONE (ROXICODONE) immediate release tablet

## 2025-03-28 NOTE — CARE COORDINATION
Transition of Care-Discharge order noted. Discharge plan is [facility] van will transport at 1300. Patient has his cell phone at bedside, he can update his family if he chooses to .     Marisa MARIN, RN  [facility]

## 2025-03-28 NOTE — FLOWSHEET NOTE
Inpatient Wound Care (initial consult) 545    Admit Date: 3/15/2025  1:52 PM    Reason for consult:  colostomy leaking    Patient sitting up in chair, awake, alert and oriented. Patient c/o headache. Nurse is aware.     Findings:     03/28/25 1254   Colostomy LLQ   Placement Date: 03/19/25   Present on Admission/Arrival: No  Location: LLQ   Stomal Appliance 1 piece;Flat;Leaking   Stoma  Assessment Red;Moist;Black  (moist eschar/slough to right side)   Peristomal Assessment Pink   Mucocutaneous Junction Separation  (at 9 o'clockl)   Treatment Barrier ring;Pouch change;Site care  (1pc flat pouch,)   Stool Appearance Soft   Stool Color Brown   Stool Amount Small   Output (mL) 100 ml   Incision 03/19/25 Abdomen Medial;Mid   Date First Assessed/Time First Assessed: 03/19/25 1335   Present on Original Admission: No  Location: Abdomen  Incision Location Orientation: Medial;Mid   Wound Image    Dressing Status New dressing applied   Incision Cleansed Cleansed with saline   Dressing/Treatment ABD pad  (4x4 gauze)   Incision Length (cm) 11   Incision Width (cm) 2 cm   Incision Depth (cm) 3.8 cm   Incision Volume (cm^3) 83.6 cm^3   Closure Staples;Other (Comment)  (with open areas)   Margins Other (Comment)  (areas of incisions left open)   Incision Assessment Other (Comment)  (draining)   Drainage Amount Small (< 25%)   Drainage Description Serosanguinous   Odor None   Stacy-incision Assessment Blanchable erythema      stoma    **Informed Consent**    The patient has given verbal consent to have photos taken of wounds and inserted into their chart as part of their permanent medical record for purposes of documentation, treatment management and/or medical review.   All Images taken on 3/28/25 of patient name: Yevgeniy Johnson were transmitted and stored on secured Epic  Site located within Media Folder Tab by a registered Epic-Haiku Mobile Application Device.     Patient acceptance for colostomy care lesson and pouch  change. Demonstrated emptying bag, removal and application of pouch. Patient observed emptying bag, removal and application of pouch. Answered all questions. Supplies are still at bedside. Patient is to go to a facility.     Plan: 4x4/ABD to abdomen   Comfort glide  Wedges  Heel protectors  Chair waffle cushion  Dietary consult  Patient will need continued preventative care   Change pouch, continue colostomy care lesson, will follow.         Leidy Britton RN 3/28/2025 6:49 PM

## 2025-03-28 NOTE — PLAN OF CARE
Problem: Discharge Planning  Goal: Discharge to home or other facility with appropriate resources  3/28/2025 1031 by Leona Lang RN  Outcome: Progressing  3/28/2025 0147 by Natalie Turner RN  Outcome: Progressing     Problem: Pain  Goal: Verbalizes/displays adequate comfort level or baseline comfort level  3/28/2025 1031 by Lenoa Lang RN  Outcome: Progressing  3/28/2025 0147 by Natalie Turner RN  Outcome: Progressing     Problem: ABCDS Injury Assessment  Goal: Absence of physical injury  3/28/2025 0147 by Natalie Turner RN  Outcome: Progressing     Problem: Safety - Adult  Goal: Free from fall injury  3/28/2025 0147 by Natalie Turner RN  Outcome: Progressing     Problem: Nutrition Deficit:  Goal: Optimize nutritional status  3/28/2025 0147 by Natalie Turner RN  Outcome: Progressing  Flowsheets (Taken 3/27/2025 1344 by D'Amico, Miranda, RD, LD)  Nutrient intake appropriate for improving, restoring, or maintaining nutritional needs:   Assess nutritional status and recommend course of action   Monitor oral intake, labs, and treatment plans   Recommend appropriate diets, oral nutritional supplements, and vitamin/mineral supplements     Problem: Skin/Tissue Integrity  Goal: Skin integrity remains intact  Description: 1.  Monitor for areas of redness and/or skin breakdown  2.  Assess vascular access sites hourly  3.  Every 4-6 hours minimum:  Change oxygen saturation probe site  4.  Every 4-6 hours:  If on nasal continuous positive airway pressure, respiratory therapy assess nares and determine need for appliance change or resting period  3/28/2025 0147 by Natalie Turner RN  Outcome: Progressing

## 2025-06-16 ENCOUNTER — HOSPITAL ENCOUNTER (INPATIENT)
Age: 65
LOS: 1 days | Discharge: HOME | DRG: 418 | End: 2025-06-18
Attending: STUDENT IN AN ORGANIZED HEALTH CARE EDUCATION/TRAINING PROGRAM | Admitting: INTERNAL MEDICINE
Payer: MEDICARE

## 2025-06-16 DIAGNOSIS — K81.0 ACUTE CHOLECYSTITIS: ICD-10-CM

## 2025-06-16 DIAGNOSIS — N39.0 URINARY TRACT INFECTION WITH HEMATURIA, SITE UNSPECIFIED: ICD-10-CM

## 2025-06-16 DIAGNOSIS — R31.9 URINARY TRACT INFECTION WITH HEMATURIA, SITE UNSPECIFIED: ICD-10-CM

## 2025-06-16 DIAGNOSIS — D72.829 LEUKOCYTOSIS, UNSPECIFIED TYPE: ICD-10-CM

## 2025-06-16 DIAGNOSIS — K81.9 CHOLECYSTITIS: Primary | ICD-10-CM

## 2025-06-16 DIAGNOSIS — K63.2 SMALL BOWEL FISTULA: ICD-10-CM

## 2025-06-16 LAB
BASOPHILS # BLD: 0.06 K/UL (ref 0–0.2)
BASOPHILS NFR BLD: 0 % (ref 0–2)
EOSINOPHIL # BLD: 0.08 K/UL (ref 0.05–0.5)
EOSINOPHILS RELATIVE PERCENT: 1 % (ref 0–6)
ERYTHROCYTE [DISTWIDTH] IN BLOOD BY AUTOMATED COUNT: 14.8 % (ref 11.5–15)
HCT VFR BLD AUTO: 38.7 % (ref 37–54)
HGB BLD-MCNC: 12.6 G/DL (ref 12.5–16.5)
IMM GRANULOCYTES # BLD AUTO: 0.06 K/UL (ref 0–0.58)
IMM GRANULOCYTES NFR BLD: 0 % (ref 0–5)
LYMPHOCYTES NFR BLD: 1.52 K/UL (ref 1.5–4)
LYMPHOCYTES RELATIVE PERCENT: 10 % (ref 20–42)
MCH RBC QN AUTO: 26.6 PG (ref 26–35)
MCHC RBC AUTO-ENTMCNC: 32.6 G/DL (ref 32–34.5)
MCV RBC AUTO: 81.6 FL (ref 80–99.9)
MONOCYTES NFR BLD: 0.96 K/UL (ref 0.1–0.95)
MONOCYTES NFR BLD: 7 % (ref 2–12)
NEUTROPHILS NFR BLD: 82 % (ref 43–80)
NEUTS SEG NFR BLD: 12.02 K/UL (ref 1.8–7.3)
PLATELET # BLD AUTO: 357 K/UL (ref 130–450)
PMV BLD AUTO: 9 FL (ref 7–12)
RBC # BLD AUTO: 4.74 M/UL (ref 3.8–5.8)
WBC OTHER # BLD: 14.7 K/UL (ref 4.5–11.5)

## 2025-06-16 PROCEDURE — 85025 COMPLETE CBC W/AUTO DIFF WBC: CPT

## 2025-06-16 PROCEDURE — 87086 URINE CULTURE/COLONY COUNT: CPT

## 2025-06-16 PROCEDURE — 83735 ASSAY OF MAGNESIUM: CPT

## 2025-06-16 PROCEDURE — 81001 URINALYSIS AUTO W/SCOPE: CPT

## 2025-06-16 PROCEDURE — 87077 CULTURE AEROBIC IDENTIFY: CPT

## 2025-06-16 PROCEDURE — 83605 ASSAY OF LACTIC ACID: CPT

## 2025-06-16 PROCEDURE — 99285 EMERGENCY DEPT VISIT HI MDM: CPT

## 2025-06-16 PROCEDURE — 83690 ASSAY OF LIPASE: CPT

## 2025-06-16 PROCEDURE — 96365 THER/PROPH/DIAG IV INF INIT: CPT

## 2025-06-16 PROCEDURE — 80053 COMPREHEN METABOLIC PANEL: CPT

## 2025-06-16 RX ORDER — FENTANYL CITRATE 50 UG/ML
50 INJECTION, SOLUTION INTRAMUSCULAR; INTRAVENOUS ONCE
Status: COMPLETED | OUTPATIENT
Start: 2025-06-16 | End: 2025-06-17

## 2025-06-16 RX ORDER — PROCHLORPERAZINE EDISYLATE 5 MG/ML
10 INJECTION INTRAMUSCULAR; INTRAVENOUS ONCE
Status: COMPLETED | OUTPATIENT
Start: 2025-06-16 | End: 2025-06-17

## 2025-06-16 RX ORDER — 0.9 % SODIUM CHLORIDE 0.9 %
1000 INTRAVENOUS SOLUTION INTRAVENOUS ONCE
Status: COMPLETED | OUTPATIENT
Start: 2025-06-16 | End: 2025-06-17

## 2025-06-16 ASSESSMENT — PAIN - FUNCTIONAL ASSESSMENT: PAIN_FUNCTIONAL_ASSESSMENT: 0-10

## 2025-06-17 ENCOUNTER — ANESTHESIA EVENT (OUTPATIENT)
Dept: OPERATING ROOM | Age: 65
DRG: 418 | End: 2025-06-17
Payer: MEDICARE

## 2025-06-17 ENCOUNTER — APPOINTMENT (OUTPATIENT)
Dept: CT IMAGING | Age: 65
DRG: 418 | End: 2025-06-17
Payer: MEDICARE

## 2025-06-17 ENCOUNTER — APPOINTMENT (OUTPATIENT)
Dept: GENERAL RADIOLOGY | Age: 65
DRG: 418 | End: 2025-06-17
Payer: MEDICARE

## 2025-06-17 ENCOUNTER — ANESTHESIA (OUTPATIENT)
Dept: OPERATING ROOM | Age: 65
DRG: 418 | End: 2025-06-17
Payer: MEDICARE

## 2025-06-17 PROBLEM — K81.0 ACUTE CHOLECYSTITIS: Status: ACTIVE | Noted: 2025-06-17

## 2025-06-17 PROBLEM — K63.2 SMALL BOWEL FISTULA: Status: ACTIVE | Noted: 2025-06-17

## 2025-06-17 LAB
ALBUMIN SERPL-MCNC: 3.9 G/DL (ref 3.5–5.2)
ALBUMIN SERPL-MCNC: 4.4 G/DL (ref 3.5–5.2)
ALP SERPL-CCNC: 98 U/L (ref 40–129)
ALP SERPL-CCNC: 99 U/L (ref 40–129)
ALT SERPL-CCNC: 10 U/L (ref 0–40)
ALT SERPL-CCNC: 10 U/L (ref 0–40)
ANION GAP SERPL CALCULATED.3IONS-SCNC: 12 MMOL/L (ref 7–16)
ANION GAP SERPL CALCULATED.3IONS-SCNC: 17 MMOL/L (ref 7–16)
AST SERPL-CCNC: 12 U/L (ref 0–39)
AST SERPL-CCNC: 13 U/L (ref 0–39)
BACTERIA URNS QL MICRO: ABNORMAL
BASOPHILS # BLD: 0.05 K/UL (ref 0–0.2)
BASOPHILS NFR BLD: 0 % (ref 0–2)
BILIRUB SERPL-MCNC: 0.2 MG/DL (ref 0–1.2)
BILIRUB SERPL-MCNC: 0.3 MG/DL (ref 0–1.2)
BILIRUB UR QL STRIP: NEGATIVE
BUN SERPL-MCNC: 11 MG/DL (ref 6–23)
BUN SERPL-MCNC: 7 MG/DL (ref 6–23)
CALCIUM SERPL-MCNC: 9.2 MG/DL (ref 8.6–10.2)
CALCIUM SERPL-MCNC: 9.7 MG/DL (ref 8.6–10.2)
CHLORIDE SERPL-SCNC: 96 MMOL/L (ref 98–107)
CHLORIDE SERPL-SCNC: 98 MMOL/L (ref 98–107)
CLARITY UR: CLEAR
CO2 SERPL-SCNC: 25 MMOL/L (ref 22–29)
CO2 SERPL-SCNC: 25 MMOL/L (ref 22–29)
COLOR UR: YELLOW
CREAT SERPL-MCNC: 0.6 MG/DL (ref 0.7–1.2)
CREAT SERPL-MCNC: 0.7 MG/DL (ref 0.7–1.2)
EOSINOPHIL # BLD: 0.02 K/UL (ref 0.05–0.5)
EOSINOPHILS RELATIVE PERCENT: 0 % (ref 0–6)
ERYTHROCYTE [DISTWIDTH] IN BLOOD BY AUTOMATED COUNT: 14.6 % (ref 11.5–15)
GFR, ESTIMATED: >90 ML/MIN/1.73M2
GFR, ESTIMATED: >90 ML/MIN/1.73M2
GLUCOSE SERPL-MCNC: 109 MG/DL (ref 74–99)
GLUCOSE SERPL-MCNC: 118 MG/DL (ref 74–99)
GLUCOSE UR STRIP-MCNC: NEGATIVE MG/DL
HCT VFR BLD AUTO: 38.9 % (ref 37–54)
HGB BLD-MCNC: 12.2 G/DL (ref 12.5–16.5)
HGB UR QL STRIP.AUTO: ABNORMAL
IMM GRANULOCYTES # BLD AUTO: 0.07 K/UL (ref 0–0.58)
IMM GRANULOCYTES NFR BLD: 0 % (ref 0–5)
KETONES UR STRIP-MCNC: NEGATIVE MG/DL
LACTATE BLDV-SCNC: 0.7 MMOL/L (ref 0.5–2.2)
LEUKOCYTE ESTERASE UR QL STRIP: ABNORMAL
LIPASE SERPL-CCNC: 12 U/L (ref 13–60)
LYMPHOCYTES NFR BLD: 1.19 K/UL (ref 1.5–4)
LYMPHOCYTES RELATIVE PERCENT: 8 % (ref 20–42)
MAGNESIUM SERPL-MCNC: 2.1 MG/DL (ref 1.6–2.6)
MCH RBC QN AUTO: 26.1 PG (ref 26–35)
MCHC RBC AUTO-ENTMCNC: 31.4 G/DL (ref 32–34.5)
MCV RBC AUTO: 83.3 FL (ref 80–99.9)
MONOCYTES NFR BLD: 1.09 K/UL (ref 0.1–0.95)
MONOCYTES NFR BLD: 7 % (ref 2–12)
NEUTROPHILS NFR BLD: 85 % (ref 43–80)
NEUTS SEG NFR BLD: 13.17 K/UL (ref 1.8–7.3)
NITRITE UR QL STRIP: POSITIVE
PH UR STRIP: 5.5 [PH] (ref 5–8)
PLATELET # BLD AUTO: 352 K/UL (ref 130–450)
PMV BLD AUTO: 9.5 FL (ref 7–12)
POTASSIUM SERPL-SCNC: 3.8 MMOL/L (ref 3.5–5)
POTASSIUM SERPL-SCNC: 4.2 MMOL/L (ref 3.5–5)
PROT SERPL-MCNC: 6.9 G/DL (ref 6.4–8.3)
PROT SERPL-MCNC: 7.9 G/DL (ref 6.4–8.3)
PROT UR STRIP-MCNC: ABNORMAL MG/DL
RBC # BLD AUTO: 4.67 M/UL (ref 3.8–5.8)
RBC #/AREA URNS HPF: ABNORMAL /HPF
SODIUM SERPL-SCNC: 135 MMOL/L (ref 132–146)
SODIUM SERPL-SCNC: 138 MMOL/L (ref 132–146)
SP GR UR STRIP: >1.03 (ref 1–1.03)
TROPONIN I SERPL HS-MCNC: 8 NG/L (ref 0–22)
UROBILINOGEN UR STRIP-ACNC: 0.2 EU/DL (ref 0–1)
WBC #/AREA URNS HPF: ABNORMAL /HPF
WBC OTHER # BLD: 15.6 K/UL (ref 4.5–11.5)

## 2025-06-17 PROCEDURE — 3600000019 HC SURGERY ROBOT ADDTL 15MIN: Performed by: SURGERY

## 2025-06-17 PROCEDURE — 6360000002 HC RX W HCPCS

## 2025-06-17 PROCEDURE — 96375 TX/PRO/DX INJ NEW DRUG ADDON: CPT

## 2025-06-17 PROCEDURE — 6370000000 HC RX 637 (ALT 250 FOR IP): Performed by: NURSE PRACTITIONER

## 2025-06-17 PROCEDURE — 96365 THER/PROPH/DIAG IV INF INIT: CPT

## 2025-06-17 PROCEDURE — 6360000002 HC RX W HCPCS: Performed by: NURSE PRACTITIONER

## 2025-06-17 PROCEDURE — 84484 ASSAY OF TROPONIN QUANT: CPT

## 2025-06-17 PROCEDURE — 96367 TX/PROPH/DG ADDL SEQ IV INF: CPT

## 2025-06-17 PROCEDURE — 85025 COMPLETE CBC W/AUTO DIFF WBC: CPT

## 2025-06-17 PROCEDURE — 2500000003 HC RX 250 WO HCPCS: Performed by: NURSE PRACTITIONER

## 2025-06-17 PROCEDURE — C1889 IMPLANT/INSERT DEVICE, NOC: HCPCS | Performed by: SURGERY

## 2025-06-17 PROCEDURE — 2580000003 HC RX 258

## 2025-06-17 PROCEDURE — 2060000000 HC ICU INTERMEDIATE R&B

## 2025-06-17 PROCEDURE — 88304 TISSUE EXAM BY PATHOLOGIST: CPT

## 2025-06-17 PROCEDURE — G0378 HOSPITAL OBSERVATION PER HR: HCPCS

## 2025-06-17 PROCEDURE — 71045 X-RAY EXAM CHEST 1 VIEW: CPT

## 2025-06-17 PROCEDURE — 8E0W4CZ ROBOTIC ASSISTED PROCEDURE OF TRUNK REGION, PERCUTANEOUS ENDOSCOPIC APPROACH: ICD-10-PCS | Performed by: SURGERY

## 2025-06-17 PROCEDURE — 36415 COLL VENOUS BLD VENIPUNCTURE: CPT

## 2025-06-17 PROCEDURE — 94640 AIRWAY INHALATION TREATMENT: CPT

## 2025-06-17 PROCEDURE — 80053 COMPREHEN METABOLIC PANEL: CPT

## 2025-06-17 PROCEDURE — S2900 ROBOTIC SURGICAL SYSTEM: HCPCS | Performed by: SURGERY

## 2025-06-17 PROCEDURE — 2500000003 HC RX 250 WO HCPCS: Performed by: SURGERY

## 2025-06-17 PROCEDURE — 93005 ELECTROCARDIOGRAM TRACING: CPT

## 2025-06-17 PROCEDURE — 3600000009 HC SURGERY ROBOT BASE: Performed by: SURGERY

## 2025-06-17 PROCEDURE — 3700000000 HC ANESTHESIA ATTENDED CARE: Performed by: SURGERY

## 2025-06-17 PROCEDURE — 3700000001 HC ADD 15 MINUTES (ANESTHESIA): Performed by: SURGERY

## 2025-06-17 PROCEDURE — 6370000000 HC RX 637 (ALT 250 FOR IP)

## 2025-06-17 PROCEDURE — 74177 CT ABD & PELVIS W/CONTRAST: CPT

## 2025-06-17 PROCEDURE — BF50200 OTHER IMAGING OF BILE DUCTS USING FLUORESCING AGENT, INDOCYANINE GREEN DYE, INTRAOPERATIVE: ICD-10-PCS | Performed by: SURGERY

## 2025-06-17 PROCEDURE — 2580000003 HC RX 258: Performed by: NURSE PRACTITIONER

## 2025-06-17 PROCEDURE — 2500000003 HC RX 250 WO HCPCS

## 2025-06-17 PROCEDURE — 2709999900 HC NON-CHARGEABLE SUPPLY: Performed by: SURGERY

## 2025-06-17 PROCEDURE — 7100000000 HC PACU RECOVERY - FIRST 15 MIN: Performed by: SURGERY

## 2025-06-17 PROCEDURE — 96376 TX/PRO/DX INJ SAME DRUG ADON: CPT

## 2025-06-17 PROCEDURE — 0FT44ZZ RESECTION OF GALLBLADDER, PERCUTANEOUS ENDOSCOPIC APPROACH: ICD-10-PCS | Performed by: SURGERY

## 2025-06-17 PROCEDURE — 2720000010 HC SURG SUPPLY STERILE: Performed by: SURGERY

## 2025-06-17 PROCEDURE — 96361 HYDRATE IV INFUSION ADD-ON: CPT

## 2025-06-17 PROCEDURE — 7100000001 HC PACU RECOVERY - ADDTL 15 MIN: Performed by: SURGERY

## 2025-06-17 DEVICE — CLIP INT L POLYMER LOK LIG HEM O LOK (6EA/PK): Type: IMPLANTABLE DEVICE | Site: ABDOMEN | Status: FUNCTIONAL

## 2025-06-17 RX ORDER — POTASSIUM CHLORIDE 7.45 MG/ML
10 INJECTION INTRAVENOUS PRN
Status: DISCONTINUED | OUTPATIENT
Start: 2025-06-17 | End: 2025-06-18 | Stop reason: HOSPADM

## 2025-06-17 RX ORDER — KETAMINE HYDROCHLORIDE 10 MG/ML
INJECTION, SOLUTION INTRAMUSCULAR; INTRAVENOUS
Status: DISCONTINUED | OUTPATIENT
Start: 2025-06-17 | End: 2025-06-17 | Stop reason: SDUPTHER

## 2025-06-17 RX ORDER — PROPOFOL 10 MG/ML
INJECTION, EMULSION INTRAVENOUS
Status: DISCONTINUED | OUTPATIENT
Start: 2025-06-17 | End: 2025-06-17 | Stop reason: SDUPTHER

## 2025-06-17 RX ORDER — INDOCYANINE GREEN AND WATER 25 MG
5 KIT INJECTION
Status: COMPLETED | OUTPATIENT
Start: 2025-06-17 | End: 2025-06-17

## 2025-06-17 RX ORDER — TRAZODONE HYDROCHLORIDE 50 MG/1
100 TABLET ORAL NIGHTLY PRN
Status: DISCONTINUED | OUTPATIENT
Start: 2025-06-17 | End: 2025-06-18 | Stop reason: HOSPADM

## 2025-06-17 RX ORDER — ACETAMINOPHEN 650 MG/1
650 SUPPOSITORY RECTAL EVERY 6 HOURS PRN
Status: DISCONTINUED | OUTPATIENT
Start: 2025-06-17 | End: 2025-06-17

## 2025-06-17 RX ORDER — OXYCODONE HYDROCHLORIDE 5 MG/1
5 TABLET ORAL EVERY 4 HOURS PRN
Refills: 0 | Status: DISCONTINUED | OUTPATIENT
Start: 2025-06-17 | End: 2025-06-18 | Stop reason: HOSPADM

## 2025-06-17 RX ORDER — METRONIDAZOLE 500 MG/100ML
500 INJECTION, SOLUTION INTRAVENOUS EVERY 8 HOURS
Status: DISCONTINUED | OUTPATIENT
Start: 2025-06-17 | End: 2025-06-17

## 2025-06-17 RX ORDER — ONDANSETRON 4 MG/1
4 TABLET, ORALLY DISINTEGRATING ORAL EVERY 8 HOURS PRN
Status: DISCONTINUED | OUTPATIENT
Start: 2025-06-17 | End: 2025-06-18 | Stop reason: HOSPADM

## 2025-06-17 RX ORDER — POTASSIUM CHLORIDE 1500 MG/1
40 TABLET, EXTENDED RELEASE ORAL PRN
Status: DISCONTINUED | OUTPATIENT
Start: 2025-06-17 | End: 2025-06-18 | Stop reason: HOSPADM

## 2025-06-17 RX ORDER — IOPAMIDOL 755 MG/ML
75 INJECTION, SOLUTION INTRAVASCULAR
Status: COMPLETED | OUTPATIENT
Start: 2025-06-17 | End: 2025-06-18

## 2025-06-17 RX ORDER — SODIUM CHLORIDE 9 MG/ML
INJECTION, SOLUTION INTRAVENOUS PRN
Status: DISCONTINUED | OUTPATIENT
Start: 2025-06-17 | End: 2025-06-18 | Stop reason: HOSPADM

## 2025-06-17 RX ORDER — SENNOSIDES 8.6 MG/1
1 TABLET ORAL DAILY PRN
Status: DISCONTINUED | OUTPATIENT
Start: 2025-06-17 | End: 2025-06-18 | Stop reason: HOSPADM

## 2025-06-17 RX ORDER — SODIUM CHLORIDE 0.9 % (FLUSH) 0.9 %
10 SYRINGE (ML) INJECTION EVERY 12 HOURS SCHEDULED
Status: DISCONTINUED | OUTPATIENT
Start: 2025-06-17 | End: 2025-06-18 | Stop reason: HOSPADM

## 2025-06-17 RX ORDER — DEXMEDETOMIDINE HYDROCHLORIDE 100 UG/ML
INJECTION, SOLUTION INTRAVENOUS
Status: DISCONTINUED | OUTPATIENT
Start: 2025-06-17 | End: 2025-06-17 | Stop reason: SDUPTHER

## 2025-06-17 RX ORDER — SODIUM CHLORIDE 0.9 % (FLUSH) 0.9 %
10 SYRINGE (ML) INJECTION PRN
Status: DISCONTINUED | OUTPATIENT
Start: 2025-06-17 | End: 2025-06-18 | Stop reason: HOSPADM

## 2025-06-17 RX ORDER — FENTANYL CITRATE 50 UG/ML
INJECTION, SOLUTION INTRAMUSCULAR; INTRAVENOUS
Status: DISCONTINUED | OUTPATIENT
Start: 2025-06-17 | End: 2025-06-17 | Stop reason: SDUPTHER

## 2025-06-17 RX ORDER — MIRTAZAPINE 15 MG/1
30 TABLET, FILM COATED ORAL NIGHTLY
Status: DISCONTINUED | OUTPATIENT
Start: 2025-06-17 | End: 2025-06-18 | Stop reason: HOSPADM

## 2025-06-17 RX ORDER — ONDANSETRON 2 MG/ML
4 INJECTION INTRAMUSCULAR; INTRAVENOUS EVERY 6 HOURS PRN
Status: DISCONTINUED | OUTPATIENT
Start: 2025-06-17 | End: 2025-06-18 | Stop reason: HOSPADM

## 2025-06-17 RX ORDER — MIDAZOLAM HYDROCHLORIDE 1 MG/ML
INJECTION, SOLUTION INTRAMUSCULAR; INTRAVENOUS
Status: DISCONTINUED | OUTPATIENT
Start: 2025-06-17 | End: 2025-06-17 | Stop reason: SDUPTHER

## 2025-06-17 RX ORDER — ALBUTEROL SULFATE 0.83 MG/ML
2.5 SOLUTION RESPIRATORY (INHALATION) EVERY 4 HOURS PRN
Status: DISCONTINUED | OUTPATIENT
Start: 2025-06-17 | End: 2025-06-18 | Stop reason: HOSPADM

## 2025-06-17 RX ORDER — SODIUM CHLORIDE 9 MG/ML
INJECTION, SOLUTION INTRAVENOUS CONTINUOUS
Status: DISCONTINUED | OUTPATIENT
Start: 2025-06-17 | End: 2025-06-18

## 2025-06-17 RX ORDER — TAMSULOSIN HYDROCHLORIDE 0.4 MG/1
0.4 CAPSULE ORAL DAILY
Status: DISCONTINUED | OUTPATIENT
Start: 2025-06-17 | End: 2025-06-18 | Stop reason: HOSPADM

## 2025-06-17 RX ORDER — METRONIDAZOLE 500 MG/100ML
500 INJECTION, SOLUTION INTRAVENOUS ONCE
Status: COMPLETED | OUTPATIENT
Start: 2025-06-17 | End: 2025-06-17

## 2025-06-17 RX ORDER — BUPIVACAINE HYDROCHLORIDE AND EPINEPHRINE 2.5; 5 MG/ML; UG/ML
INJECTION, SOLUTION EPIDURAL; INFILTRATION; INTRACAUDAL; PERINEURAL PRN
Status: DISCONTINUED | OUTPATIENT
Start: 2025-06-17 | End: 2025-06-17 | Stop reason: ALTCHOICE

## 2025-06-17 RX ORDER — ACETAMINOPHEN 325 MG/1
650 TABLET ORAL EVERY 6 HOURS SCHEDULED
Status: DISCONTINUED | OUTPATIENT
Start: 2025-06-17 | End: 2025-06-18 | Stop reason: HOSPADM

## 2025-06-17 RX ORDER — SODIUM CHLORIDE 9 MG/ML
INJECTION, SOLUTION INTRAVENOUS CONTINUOUS
Status: CANCELLED | OUTPATIENT
Start: 2025-06-17 | End: 2025-06-17

## 2025-06-17 RX ORDER — ROCURONIUM BROMIDE 10 MG/ML
INJECTION, SOLUTION INTRAVENOUS
Status: DISCONTINUED | OUTPATIENT
Start: 2025-06-17 | End: 2025-06-17 | Stop reason: SDUPTHER

## 2025-06-17 RX ORDER — ARFORMOTEROL TARTRATE 15 UG/2ML
15 SOLUTION RESPIRATORY (INHALATION)
Status: DISCONTINUED | OUTPATIENT
Start: 2025-06-17 | End: 2025-06-18 | Stop reason: HOSPADM

## 2025-06-17 RX ORDER — METOCLOPRAMIDE HYDROCHLORIDE 5 MG/ML
10 INJECTION INTRAMUSCULAR; INTRAVENOUS ONCE
Status: COMPLETED | OUTPATIENT
Start: 2025-06-17 | End: 2025-06-17

## 2025-06-17 RX ORDER — PHENYLEPHRINE HCL IN 0.9% NACL 1 MG/10 ML
SYRINGE (ML) INTRAVENOUS
Status: DISCONTINUED | OUTPATIENT
Start: 2025-06-17 | End: 2025-06-17 | Stop reason: SDUPTHER

## 2025-06-17 RX ORDER — BUPRENORPHINE HYDROCHLORIDE AND NALOXONE HYDROCHLORIDE DIHYDRATE 8; 2 MG/1; MG/1
1 TABLET SUBLINGUAL
Status: DISCONTINUED | OUTPATIENT
Start: 2025-06-17 | End: 2025-06-18 | Stop reason: HOSPADM

## 2025-06-17 RX ORDER — LIDOCAINE HYDROCHLORIDE 20 MG/ML
INJECTION, SOLUTION EPIDURAL; INFILTRATION; INTRACAUDAL; PERINEURAL
Status: DISCONTINUED | OUTPATIENT
Start: 2025-06-17 | End: 2025-06-17 | Stop reason: SDUPTHER

## 2025-06-17 RX ORDER — BUPRENORPHINE HYDROCHLORIDE AND NALOXONE HYDROCHLORIDE DIHYDRATE 8; 2 MG/1; MG/1
2 TABLET SUBLINGUAL EVERY MORNING
Status: DISCONTINUED | OUTPATIENT
Start: 2025-06-17 | End: 2025-06-18 | Stop reason: HOSPADM

## 2025-06-17 RX ORDER — DEXAMETHASONE SODIUM PHOSPHATE 4 MG/ML
INJECTION, SOLUTION INTRA-ARTICULAR; INTRALESIONAL; INTRAMUSCULAR; INTRAVENOUS; SOFT TISSUE
Status: DISCONTINUED | OUTPATIENT
Start: 2025-06-17 | End: 2025-06-17 | Stop reason: SDUPTHER

## 2025-06-17 RX ORDER — OXYCODONE HYDROCHLORIDE 5 MG/1
10 TABLET ORAL EVERY 4 HOURS PRN
Refills: 0 | Status: DISCONTINUED | OUTPATIENT
Start: 2025-06-17 | End: 2025-06-18 | Stop reason: HOSPADM

## 2025-06-17 RX ORDER — MAGNESIUM SULFATE IN WATER 40 MG/ML
2000 INJECTION, SOLUTION INTRAVENOUS PRN
Status: DISCONTINUED | OUTPATIENT
Start: 2025-06-17 | End: 2025-06-18 | Stop reason: HOSPADM

## 2025-06-17 RX ORDER — ACETAMINOPHEN 325 MG/1
650 TABLET ORAL EVERY 6 HOURS PRN
Status: DISCONTINUED | OUTPATIENT
Start: 2025-06-17 | End: 2025-06-17

## 2025-06-17 RX ADMIN — PHENYLEPHRINE HYDROCHLORIDE 100 MCG: 10 INJECTION INTRAVENOUS at 15:22

## 2025-06-17 RX ADMIN — ROCURONIUM BROMIDE 50 MG: 10 INJECTION, SOLUTION INTRAVENOUS at 14:41

## 2025-06-17 RX ADMIN — PIPERACILLIN AND TAZOBACTAM 3375 MG: 3; .375 INJECTION, POWDER, LYOPHILIZED, FOR SOLUTION INTRAVENOUS at 22:32

## 2025-06-17 RX ADMIN — ONDANSETRON 4 MG: 2 INJECTION INTRAMUSCULAR; INTRAVENOUS at 09:59

## 2025-06-17 RX ADMIN — PROCHLORPERAZINE EDISYLATE 10 MG: 5 INJECTION INTRAMUSCULAR; INTRAVENOUS at 00:43

## 2025-06-17 RX ADMIN — SUGAMMADEX 200 MG: 100 INJECTION, SOLUTION INTRAVENOUS at 15:49

## 2025-06-17 RX ADMIN — PROPOFOL 200 MG: 10 INJECTION, EMULSION INTRAVENOUS at 14:41

## 2025-06-17 RX ADMIN — Medication 100 MCG: at 14:50

## 2025-06-17 RX ADMIN — METRONIDAZOLE 500 MG: 500 INJECTION, SOLUTION INTRAVENOUS at 04:57

## 2025-06-17 RX ADMIN — IPRATROPIUM BROMIDE 0.5 MG: 0.5 SOLUTION RESPIRATORY (INHALATION) at 12:40

## 2025-06-17 RX ADMIN — SODIUM CHLORIDE, PRESERVATIVE FREE 10 ML: 5 INJECTION INTRAVENOUS at 20:03

## 2025-06-17 RX ADMIN — WATER 1000 MG: 1 INJECTION INTRAMUSCULAR; INTRAVENOUS; SUBCUTANEOUS at 04:50

## 2025-06-17 RX ADMIN — METOCLOPRAMIDE 10 MG: 5 INJECTION, SOLUTION INTRAMUSCULAR; INTRAVENOUS at 04:50

## 2025-06-17 RX ADMIN — SODIUM CHLORIDE 1000 ML: 0.9 INJECTION, SOLUTION INTRAVENOUS at 00:48

## 2025-06-17 RX ADMIN — PHENYLEPHRINE HYDROCHLORIDE 100 MCG: 10 INJECTION INTRAVENOUS at 15:25

## 2025-06-17 RX ADMIN — DEXMEDETOMIDINE HCL 6 MCG: 100 INJECTION INTRAVENOUS at 15:46

## 2025-06-17 RX ADMIN — DEXMEDETOMIDINE HCL 10 MCG: 100 INJECTION INTRAVENOUS at 15:04

## 2025-06-17 RX ADMIN — WATER 1000 MG: 1 INJECTION INTRAMUSCULAR; INTRAVENOUS; SUBCUTANEOUS at 00:43

## 2025-06-17 RX ADMIN — OXYCODONE 5 MG: 5 TABLET ORAL at 18:20

## 2025-06-17 RX ADMIN — KETAMINE HYDROCHLORIDE 25 MG: 10 INJECTION INTRAMUSCULAR; INTRAVENOUS at 15:09

## 2025-06-17 RX ADMIN — SODIUM CHLORIDE, PRESERVATIVE FREE 10 ML: 5 INJECTION INTRAVENOUS at 10:16

## 2025-06-17 RX ADMIN — FENTANYL CITRATE 100 MCG: 50 INJECTION, SOLUTION INTRAMUSCULAR; INTRAVENOUS at 14:41

## 2025-06-17 RX ADMIN — SODIUM CHLORIDE: 0.9 INJECTION, SOLUTION INTRAVENOUS at 20:13

## 2025-06-17 RX ADMIN — HYDROMORPHONE HYDROCHLORIDE 1 MG: 1 INJECTION, SOLUTION INTRAMUSCULAR; INTRAVENOUS; SUBCUTANEOUS at 02:49

## 2025-06-17 RX ADMIN — PIPERACILLIN AND TAZOBACTAM 3375 MG: 3; .375 INJECTION, POWDER, LYOPHILIZED, FOR SOLUTION INTRAVENOUS at 14:46

## 2025-06-17 RX ADMIN — ACETAMINOPHEN 650 MG: 325 TABLET ORAL at 20:03

## 2025-06-17 RX ADMIN — HYDROMORPHONE HYDROCHLORIDE 1 MG: 1 INJECTION, SOLUTION INTRAMUSCULAR; INTRAVENOUS; SUBCUTANEOUS at 04:51

## 2025-06-17 RX ADMIN — MIDAZOLAM 2 MG: 1 INJECTION INTRAMUSCULAR; INTRAVENOUS at 14:33

## 2025-06-17 RX ADMIN — ONDANSETRON 4 MG: 2 INJECTION INTRAMUSCULAR; INTRAVENOUS at 15:46

## 2025-06-17 RX ADMIN — DEXMEDETOMIDINE HCL 10 MCG: 100 INJECTION INTRAVENOUS at 15:15

## 2025-06-17 RX ADMIN — DEXAMETHASONE SODIUM PHOSPHATE 10 MG: 4 INJECTION, SOLUTION INTRAMUSCULAR; INTRAVENOUS at 14:46

## 2025-06-17 RX ADMIN — INDOCYANINE GREEN AND WATER 5 MG: KIT at 13:54

## 2025-06-17 RX ADMIN — MIRTAZAPINE 30 MG: 15 TABLET, FILM COATED ORAL at 22:21

## 2025-06-17 RX ADMIN — LIDOCAINE HYDROCHLORIDE 100 MG: 20 INJECTION, SOLUTION EPIDURAL; INFILTRATION; INTRACAUDAL; PERINEURAL at 14:41

## 2025-06-17 RX ADMIN — SODIUM CHLORIDE: 0.9 INJECTION, SOLUTION INTRAVENOUS at 10:08

## 2025-06-17 RX ADMIN — HYDROMORPHONE HYDROCHLORIDE 1 MG: 1 INJECTION, SOLUTION INTRAMUSCULAR; INTRAVENOUS; SUBCUTANEOUS at 09:59

## 2025-06-17 RX ADMIN — PIPERACILLIN AND TAZOBACTAM 4500 MG: 4; .5 INJECTION, POWDER, FOR SOLUTION INTRAVENOUS at 10:11

## 2025-06-17 RX ADMIN — KETAMINE HYDROCHLORIDE 25 MG: 10 INJECTION INTRAMUSCULAR; INTRAVENOUS at 14:41

## 2025-06-17 RX ADMIN — FENTANYL CITRATE 50 MCG: 50 INJECTION INTRAMUSCULAR; INTRAVENOUS at 00:43

## 2025-06-17 RX ADMIN — OXYCODONE 5 MG: 5 TABLET ORAL at 22:21

## 2025-06-17 ASSESSMENT — PAIN DESCRIPTION - LOCATION
LOCATION: ABDOMEN

## 2025-06-17 ASSESSMENT — PAIN SCALES - GENERAL
PAINLEVEL_OUTOF10: 8
PAINLEVEL_OUTOF10: 10
PAINLEVEL_OUTOF10: 8
PAINLEVEL_OUTOF10: 4
PAINLEVEL_OUTOF10: 7
PAINLEVEL_OUTOF10: 8
PAINLEVEL_OUTOF10: 10
PAINLEVEL_OUTOF10: 9
PAINLEVEL_OUTOF10: 6
PAINLEVEL_OUTOF10: 10

## 2025-06-17 ASSESSMENT — PAIN DESCRIPTION - DESCRIPTORS
DESCRIPTORS: ACHING;SHARP;THROBBING
DESCRIPTORS: ACHING;STABBING;SORE
DESCRIPTORS: DISCOMFORT
DESCRIPTORS: STABBING;ACHING;DULL

## 2025-06-17 ASSESSMENT — PAIN DESCRIPTION - ORIENTATION
ORIENTATION: RIGHT;LEFT;LOWER;MID
ORIENTATION: UPPER
ORIENTATION: RIGHT;LEFT;LOWER;MID
ORIENTATION: RIGHT;LEFT;LOWER;MID
ORIENTATION: RIGHT;LEFT;MID;LOWER

## 2025-06-17 ASSESSMENT — LIFESTYLE VARIABLES
HOW OFTEN DO YOU HAVE A DRINK CONTAINING ALCOHOL: NEVER
HOW MANY STANDARD DRINKS CONTAINING ALCOHOL DO YOU HAVE ON A TYPICAL DAY: PATIENT DOES NOT DRINK
SMOKING_STATUS: 1

## 2025-06-17 ASSESSMENT — PAIN DESCRIPTION - PAIN TYPE: TYPE: ACUTE PAIN;CHRONIC PAIN

## 2025-06-17 ASSESSMENT — PAIN - FUNCTIONAL ASSESSMENT
PAIN_FUNCTIONAL_ASSESSMENT: ACTIVITIES ARE NOT PREVENTED

## 2025-06-17 NOTE — CARE COORDINATION
Internal Medicine On-call Care Coordination Note    I was called by the ED physician because they recommended admission for this patient and we cover their PCP.  The history as I understand it after discussion with the ED physician is as follows:    Presents with abd pain, decreased PO intake, nausea  Hx colorectal surgery in March  Concern for cholecystitis, small bowel fistula  Also with UTI    I placed admission orders.  Including:    General admission orders  Reconciled home meds  IV rocephin and flagyl  IVF  NPO  General surgery consulted in ED  Held anticoagulants pending plan     Dr. Palm, or our coverage will see the patient for H&P.    Electronically signed by STUART Sumner CNP on 6/17/2025 at 6:22 AM

## 2025-06-17 NOTE — ED NOTES
PrecautionsPrecautions: NoneNegative Pressure Room: NoPositive Pressure Room: No  Safe EnvironmentArm Bands On: ID; AllergiesPatient has limb restriction?: NoSafety Measures: Standard Safety Measures; Bed/Chair-Wheels locked; Bed in low position; Call light within reach; Gripper socks; Overbed table w/in reach  TelemetryCardiac/Telemetry Monitor On: No (pt refusing)  Fall Risk InterventionsNursing Judgement-Fall Risk High(Add Comments): NoToilet Every 2 Hours-In Advance of Need: YesHourly Visual Checks: Awake; In bedFall Visual Posted: SocksRoom Door Open: YesPatient Moved Closer to Nursing Station: No  MobilityActivity: In bedLevel of Assistance: IndependentAmbulation Response: Tolerated wellTurned/Repositioned: Turns selfHead of Bed Elevated : Self regulated  NutritionNPO: Yes  HygieneHygiene: Stacy careLevel of Assistance: Independent  Comfort and Environment InterventionsComfort: Lights dim  Safety CompanionAlternatives to Sitter: Comfort Measures; Eliminate Invasive Treatment; Decrease Stimulation; Increased Frequency of Nursing Rounds

## 2025-06-17 NOTE — ED NOTES
Radiology Procedure Waiver   Name: eYvgeniy Johnson  : 1960  MRN: [id number]    Date:  25    Time: 10:59 PM EDT    Benefits of immediately proceeding with Radiology exam(s) without pre-testing outweigh the risks or are not indicated as specified below and therefore the following is/are being waived:    [] Pregnancy test   [] Patients LMP on-time and regular.   [] Patient had Tubal Ligation or has other Contraception Device.   [] Patient  is Menopausal or Premenarcheal.    [] Patient had Full or Partial Hysterectomy.    [] Protocol for Iodine allergy    [] MRI Questionnaire     [x] BUN/Creatinine   [] Patient age w/no hx of renal dysfunction.   [] Patient on Dialysis.   [] Recent Normal Labs.  Electronically signed by Yan Cerna MD on 25 at 10:59 PM EDT

## 2025-06-17 NOTE — PROGRESS NOTES
[facility] Quality Flow/Interdisciplinary Rounds Progress Note        Quality Flow Rounds held on June 17, 2025    Disciplines Attending:  Bedside Nurse, , , and Nursing Unit Leadership    Yevgeniy Jorge Johnson was admitted on 6/16/2025 10:56 PM    Anticipated Discharge Date:       Disposition:    Johnnie Score:       [facility] RISK OF UNPLANNED READMISSION 2.0             10.6 Total Score        Discussed patient goal for the day, patient clinical progression, and barriers to discharge.  The following Goal(s) of the Day/Commitment(s) have been identified:  discharge planning, surg, lap aron, IV zosyn, saline 100cc/hour      Nash Bullock RN  June 17, 2025         lower abd pain/NAUSEA

## 2025-06-17 NOTE — ED PROVIDER NOTES
[facility] EMERGENCY DEPARTMENT  EMERGENCY DEPARTMENT ENCOUNTER      Pt Name: Yevgeniy Johnson  MRN: [id number]  Birthdate 1960  Date of evaluation: 6/16/2025  Provider: Yan Cerna MD  PCP: Tanvir Levy MD  Note Started: 10:35 PM EDT 6/16/25    CHIEF COMPLAINT       Chief Complaint   Patient presents with    Abdominal Pain     Ongoing for 2 days, pt states he had colorectal surgery with Dr. Barrett in March    Back Pain       HISTORY OF PRESENT ILLNESS: 1 or more Elements   History From: Patient  Limitations to history : None    Yevgeniy Johnson is a 64 y.o. male who presents brought in by private vehicle from home with complaints of abdominal pain worse in the lower abdomen radiating to back ongoing for past couple days.  Associated with decreased p.o. intake, nausea.  Per patient and per chart review he is status post takedown of colovesicular fistula with SB fistula with right hemicolectomy, Kang's and drainage of intra-abdominal abscess on 3/19/2025.  Was discharged from the hospital on 3/28/2025 without immediate complications.  Patient reports that over the past couple of days he has had worsening abdominal pain with decreasing output from ostomy.  He reports he is also having mucus output from his anus.  Denies urinary symptoms.  Currently denies objective fevers or chills, chest pain or pressure, shortness of breath, dysuria.    Nursing Notes were all reviewed and agreed with or any disagreements were addressed in the HPI.    REVIEW OF SYSTEMS :    Positives and Pertinent negatives as per HPI.     PAST MEDICAL HISTORY/Chronic Conditions Affecting Care    has a past medical history of COPD (chronic obstructive pulmonary disease) (HCC) and Hypertension.     SURGICAL HISTORY     Past Surgical History:   Procedure Laterality Date    CYSTOSCOPY N/A 03/19/2025    CYSTOSCOPY BILATERAL URETERAL CATHETER INSERTION performed by Edwin Mcgrath MD at [facility] OR    [facility]

## 2025-06-17 NOTE — ANESTHESIA POSTPROCEDURE EVALUATION
Department of Anesthesiology  Postprocedure Note    Patient: Yevgeniy Johnson  MRN: [id number]  YOB: 1960  Date of evaluation: 6/17/2025    Procedure Summary       Date: 06/17/25 Room / Location: [address]    Anesthesia Start: 1433 Anesthesia Stop: 1559    Procedure: LAPAROSCOPIC ROBOTIC XI CHOLECYSTECTOMY (Abdomen) Diagnosis:       Acute cholecystitis      (Acute cholecystitis [K81.0])    Surgeons: Francis Barrett MD Responsible Provider: Emily Correa MD    Anesthesia Type: General ASA Status: 3            Anesthesia Type: General    Nona Phase I:      Nona Phase II:      Anesthesia Post Evaluation    Patient location during evaluation: PACU  Patient participation: complete - patient participated  Level of consciousness: awake and alert and awake  Pain score: 0  Airway patency: patent  Nausea & Vomiting: no nausea and no vomiting  Cardiovascular status: hemodynamically stable  Respiratory status: acceptable, spontaneous ventilation and nonlabored ventilation  Hydration status: euvolemic  Multimodal analgesia pain management approach  Pain management: adequate and satisfactory to patient        No notable events documented.

## 2025-06-17 NOTE — ANESTHESIA PRE PROCEDURE
Department of Anesthesiology  Preprocedure Note       Name:  Yevgeniy Johnson   Age:  64 y.o.  :  1960                                          MRN:  [id number]         Date:  2025      Surgeon: Surgeon(s):  Francis Barrett MD    Procedure: Procedure(s):  LAPAROSCOPIC ROBOTIC XI CHOLECYSTECTOMY    Medications prior to admission:   Prior to Admission medications    Medication Sig Start Date End Date Taking? Authorizing Provider   buprenorphine-naloxone (SUBOXONE) 8-2 MG SUBL SL tablet Place 2 tablets under the tongue daily.   Yes Hany Yates MD   buprenorphine-naloxone (SUBOXONE) 8-2 MG SUBL SL tablet Place 1 tablet under the tongue nightly.   Yes Hany Yates MD   tamsulosin (FLOMAX) 0.4 MG capsule Take 1 capsule by mouth daily 22  Yes Jenniffer Méndez APRN - NP   traZODone (DESYREL) 100 MG tablet take 1 tablet by mouth at bedtime if needed for insomnia 22   Hany Yates MD   ANORO ELLIPTA 62.5-25 MCG/INH AEPB inhaler inhale 1 puff by mouth and INTO THE LUNGS once daily 22   Hany Yates MD   mirtazapine (REMERON) 30 MG tablet  22   Hany Yates MD   albuterol (PROVENTIL) (2.5 MG/3ML) 0.083% nebulizer solution inhale contents of 1 vial ( 3 milliliters ) in nebulizer by mouth...  (REFER TO PRESCRIPTION NOTES). 22   Hany Yates MD   ondansetron (ZOFRAN ODT) 4 MG disintegrating tablet Take 1 tablet by mouth 3 times daily as needed for Nausea or Vomiting 22   Dennis Alfred PA       Current medications:    Current Facility-Administered Medications   Medication Dose Route Frequency Provider Last Rate Last Admin    iopamidol (ISOVUE-370) 76 % injection 75 mL  75 mL IntraVENous ONCE PRN Braden Barakat MD        traZODone (DESYREL) tablet 100 mg  100 mg Oral Nightly PRN Mary Ann Stanford APRN - CNP        mirtazapine (REMERON) tablet 30 mg  30 mg Oral Nightly Mary Ann Stanford APRN - CNP        albuterol (PROVENTIL) (2.5

## 2025-06-17 NOTE — CONSULTS
General Surgery   Consult Note      Patient's Name/Date of Birth: Yevgeniy Johnson / 1960    Date: June 17, 2025     PCP: Tanvir Levy MD     Reason for consult:: Acute aron     HPI:   Yevgeniy Johnson is a 64 y.o. male who presents for evaluation of abdominal pain nausea and vomiting this been going on for 2 days.  He says he is unable to keep anything down.  He is mainly complaining of right upper quadrant and epigastric pain.  He said this never happened to him in the past.  He also endorses some fevers and chills that happened last night.  Otherwise, no complaints.  Patient is known to general surgery and was seen in the past for colovesical fistula.  He received an ex lap with a Kang's.  He says his ostomy is working well and he is having no issues with that.  Otherwise, no complaint.    In the ED, his vital signs were stable.  BMP is unremarkable.  Hepatic functional panel is unremarkable.  CBC with a white blood count of 14.7.  CT scan showed distended gallbladder with some wall thickening.  There is some free gas in the gallbladder that is concerning for emphysematous cholecystitis.  He also has some fluid around his gallbladder.  There is also noted some phlegmon/mesenteric stranding in the left lower quadrant.  But no formed abscess.    Patient medical history includes COPD and hypertension.  Abdominal surgical history as above.      Patient Active Problem List   Diagnosis    Pelvic abscess in male (HCC)    Hypertension    COPD (chronic obstructive pulmonary disease) (HCC)    Acute cholecystitis    Small bowel fistula       Allergies   Allergen Reactions    Citalopram     Gabapentin     Ketorolac Tromethamine        Past Medical History:   Diagnosis Date    Anxiety     Colonic diverticular abscess     Colovesical fistula     COPD (chronic obstructive pulmonary disease) (HCC)     Hypertension     Opiate dependence (HCC)        Past Surgical History:   Procedure Laterality Date     limited to them) and discussed the case with the resident. I personally reviewed all relevant labs and imaging data. Please refer to the resident's note. I agree with the assessment and plan with the following corrections/ additions. The following summarizes my clinical findings and independent assessment. I am actively managing this patient's medication.           Francis Barrett MD

## 2025-06-17 NOTE — PROGRESS NOTES
4 Eyes Skin Assessment     NAME:  Yevgeniy Johnson  YOB: 1960  MEDICAL RECORD NUMBER:  [id number]    The patient is being assessed for  Admission    I agree that at least one RN has performed a thorough Head to Toe Skin Assessment on the patient. ALL assessment sites listed below have been assessed.      Areas assessed by both nurses:    Head, Face, Ears, Shoulders, Back, Chest, Arms, Elbows, Hands, Sacrum. Buttock, Coccyx, Ischium, Legs. Feet and Heels, and Under Medical Devices         Does the Patient have a Wound? No noted wound(s)       Johnnie Prevention initiated by RN: Yes  Wound Care Orders initiated by RN: No    Pressure Injury (Stage 3,4, Unstageable, DTI, NWPT, and Complex wounds) if present, place Wound referral order by RN under : No    New Ostomies, if present place, Ostomy referral order under : Yes     Nurse 1 eSignature: Electronically signed by Kristy Pedroza RN on 6/17/25 at 10:51 AM EDT    **SHARE this note so that the co-signing nurse can place an eSignature**    Nurse 2 eSignature: Electronically signed by Araceli Diez RN on 6/17/25 at 10:52 AM EDT

## 2025-06-17 NOTE — ED NOTES
ED TO INPATIENT SBAR HANDOFF    Patient Name: Yevgeniy Johnson   Preferred Name: Yevgeniy  : 1960  64 y.o.   Code Status Order: Prior  Telemetry Order: No  C-SSRS: Risk of Suicide: No Risk  Sitter no   Restraints:   no    Situation  Chief Complaint   Patient presents with    Abdominal Pain     Ongoing for 2 days, pt states he had colorectal surgery with Dr. Barrett in March    Back Pain     Brief Description of Patient's Condition: stable  Mental Status: oriented, alert, coherent, logical, thought processes intact, and able to concentrate and follow conversation    Background  Allergies:   Allergies   Allergen Reactions    Citalopram     Gabapentin     Ketorolac Tromethamine        Assessment  Vitals/MEWS: MEWS Score: 1  Level of Consciousness: Alert (0)   Vitals:    25 0600 25 0615 25 0630 25 0645   BP:    124/66   Pulse:    70   Resp:       Temp:    98.2 °F (36.8 °C)   TempSrc:    Oral   SpO2: 97% 98% 95% 97%   Weight:       Height:         Cardiac Rhythm:    Deterioration Index (DI): Deterioration Index: 24.8  Deterioration Index (DI) Interventions Performed:    O2 Flow Rate:    O2 Device: O2 Device: None (Room air)    Active Central Lines:                          Active Wounds:    Active Jorge's:      Recommendation  Patient Belongings:  at bedside  If any further questions, please call Sending RN at 1133  Notified 6th floor of SBAR completion: Clarissa - advised to have RN call if there are any questions      Electronically signed by: Electronically signed by Mary Ann Bonilla RN on 2025 at 7:01 AM

## 2025-06-17 NOTE — H&P
Internal Medicine History & Physical     Name: Yevgeniy Johnson  : 1960  Chief Complaint: Abdominal Pain (Ongoing for 2 days, pt states he had colorectal surgery with Dr. Barrett in March) and Back Pain  Primary Care Physician: Tanvir Levy MD  Admission date: 2025  Date of service: 2025     History of Present Illness  Yevgeniy is a 64 y.o. year old male with a past medical history of anxiety, BPH, COPD and colovesical fistula with diverticular abscess s/p right hemicolectomy. He presented to the ER on  with complaints of abdominal pain and back pain that began about two to three days prior to arrival. He reports that Friday evening he noticed generalized abdominal pain mostly in his right abdomen lower and upper abdomen. He reports associated nausea but denies any emesis. He has had decreased appetite due to the pain and nausea. He reports feeling fevered and has been having hot/cold spells however never checked his temperate. He reports decreased output from his ostomy with associated abdominal distention. His symptoms have been constant and severe, nothing in particular seems to make it better or worse. Given the persistent pain he decided to come to the ER for further evaluation. Upon arrival to the ER he was noted to be hemodynamically stable. Lab work was obtained and noted WBC 14.7 but was otherwise unremarkable. UA was concerning for possible UTI. CT abdomen/pelvis was also obtained and showed cholelithiasis with gallbladder wall thickening/pericholecystic fluid as well as a possible developing small bowel fistula. He was given IV pain medications as well as Rocephin/Flagyl and admitted for further management.    Currently he is seen lying in bed awake and alert, seems tired and uncomfortable. He reports ongoing pain in his abdomen, primarily the right side. He denies any fever or chills at this time. He continues to feel nauseated, mouth is dry and asking for a swab. He does

## 2025-06-17 NOTE — OP NOTE
Operative Note      Patient: Yevgeniy Johnson  YOB: 1960  MRN: [id number]    Date of Procedure: 6/17/2025    Pre-Op Diagnosis Codes:      * Acute cholecystitis [K81.0]    Post-Op Diagnosis: Same, perforated gallbladder        Procedure(s):  LAPAROSCOPIC ROBOTIC XI CHOLECYSTECTOMY with ICG cholangiogram     Surgeon(s):  Francis Barrett MD    Assistant:   Resident: Gale Ramirez DO    Anesthesia: General    Estimated Blood Loss (mL): Minimal    Complications: None    Specimens:   ID Type Source Tests Collected by Time Destination   A : GALLBLADDER Tissue Gallbladder SURGICAL PATHOLOGY Francis Barrett MD 6/17/2025 1506        Implants:  Implant Name Type Inv. Item Serial No.  Lot No. LRB No. Used Action   CLIP INT L POLYMER PEGGY LIG HEM O PEGGY (6EA/PK) - [id number]  CLIP INT L POLYMER PEGGY LIG HEM O PEGGY (6EA/PK)  [employer]  N/A 1 Implanted         Drains:   Colostomy LLQ (Active)   Stomal Appliance Clean;Dry;Intact 06/17/25 1053   Stoma  Assessment ELICEO 06/17/25 1053   Stool Appearance Soft 06/17/25 1053   Stool Color Brown 06/17/25 1053       Findings:  Infection Present At Time Of Surgery (PATOS) (choose all levels that have infection present):  No infection present  Other Findings: none    Procedure:    The patient was taken to the operating room and placed supine on the operating room table, administered general anesthesia and intubated. Once the airway was secured and the patient was adequately sedated a time-out was performed to confirm the surgical site and the patient's name.      We initially made an 8mm incision incision at the LUQ and inserted the Veress needle. Confirmed to be in place we insufflated to 15mmHg mercury. The needle was removed and an 8mm camera trocar was inserted. An 8-mm incision was made in the left side of the abomen and a robotic port was inserted. There was some lysis of adhesions  that was done in order to put the final port. The camera was  were removed.  Then we used 4-0 Monocryl suture to reapproximate the skin. Surgical glue was placed over the skin.     The patient was awoken and extubated in the operating room without any difficulty, and transferred to the postoperative care unit in stable condition. All instrument counts, lap counts, and needle counts were correct at the completion of the procedure.      Dr. Barrett was present and available for the procedure       Electronically signed by Gale Ramirez DO on 6/17/2025 at 3:55 PM

## 2025-06-18 VITALS
RESPIRATION RATE: 18 BRPM | OXYGEN SATURATION: 95 % | SYSTOLIC BLOOD PRESSURE: 116 MMHG | DIASTOLIC BLOOD PRESSURE: 80 MMHG | TEMPERATURE: 97.7 F | HEIGHT: 70 IN | WEIGHT: 180 LBS | BODY MASS INDEX: 25.77 KG/M2 | HEART RATE: 76 BPM

## 2025-06-18 LAB
ALBUMIN SERPL-MCNC: 3.8 G/DL (ref 3.5–5.2)
ALP SERPL-CCNC: 80 U/L (ref 40–129)
ALT SERPL-CCNC: 20 U/L (ref 0–40)
ANION GAP SERPL CALCULATED.3IONS-SCNC: 11 MMOL/L (ref 7–16)
AST SERPL-CCNC: 19 U/L (ref 0–39)
BASOPHILS # BLD: 0.02 K/UL (ref 0–0.2)
BASOPHILS NFR BLD: 0 % (ref 0–2)
BILIRUB SERPL-MCNC: 0.3 MG/DL (ref 0–1.2)
BUN SERPL-MCNC: 9 MG/DL (ref 6–23)
CALCIUM SERPL-MCNC: 9.2 MG/DL (ref 8.6–10.2)
CHLORIDE SERPL-SCNC: 103 MMOL/L (ref 98–107)
CO2 SERPL-SCNC: 26 MMOL/L (ref 22–29)
CREAT SERPL-MCNC: 0.7 MG/DL (ref 0.7–1.2)
EKG ATRIAL RATE: 65 BPM
EKG P AXIS: 39 DEGREES
EKG P-R INTERVAL: 120 MS
EKG Q-T INTERVAL: 442 MS
EKG QRS DURATION: 98 MS
EKG QTC CALCULATION (BAZETT): 459 MS
EKG R AXIS: 17 DEGREES
EKG T AXIS: 50 DEGREES
EKG VENTRICULAR RATE: 65 BPM
EOSINOPHIL # BLD: 0 K/UL (ref 0.05–0.5)
EOSINOPHILS RELATIVE PERCENT: 0 % (ref 0–6)
ERYTHROCYTE [DISTWIDTH] IN BLOOD BY AUTOMATED COUNT: 14.9 % (ref 11.5–15)
GFR, ESTIMATED: >90 ML/MIN/1.73M2
GLUCOSE SERPL-MCNC: 127 MG/DL (ref 74–99)
HCT VFR BLD AUTO: 35.2 % (ref 37–54)
HGB BLD-MCNC: 11.6 G/DL (ref 12.5–16.5)
IMM GRANULOCYTES # BLD AUTO: 0.08 K/UL (ref 0–0.58)
IMM GRANULOCYTES NFR BLD: 1 % (ref 0–5)
LYMPHOCYTES NFR BLD: 1.01 K/UL (ref 1.5–4)
LYMPHOCYTES RELATIVE PERCENT: 8 % (ref 20–42)
MCH RBC QN AUTO: 26.6 PG (ref 26–35)
MCHC RBC AUTO-ENTMCNC: 33 G/DL (ref 32–34.5)
MCV RBC AUTO: 80.7 FL (ref 80–99.9)
MONOCYTES NFR BLD: 0.94 K/UL (ref 0.1–0.95)
MONOCYTES NFR BLD: 7 % (ref 2–12)
NEUTROPHILS NFR BLD: 85 % (ref 43–80)
NEUTS SEG NFR BLD: 11.41 K/UL (ref 1.8–7.3)
PLATELET # BLD AUTO: 310 K/UL (ref 130–450)
PMV BLD AUTO: 9.6 FL (ref 7–12)
POTASSIUM SERPL-SCNC: 4.5 MMOL/L (ref 3.5–5)
PROT SERPL-MCNC: 6.8 G/DL (ref 6.4–8.3)
RBC # BLD AUTO: 4.36 M/UL (ref 3.8–5.8)
SODIUM SERPL-SCNC: 140 MMOL/L (ref 132–146)
WBC OTHER # BLD: 13.5 K/UL (ref 4.5–11.5)

## 2025-06-18 PROCEDURE — G0378 HOSPITAL OBSERVATION PER HR: HCPCS

## 2025-06-18 PROCEDURE — 93010 ELECTROCARDIOGRAM REPORT: CPT | Performed by: INTERNAL MEDICINE

## 2025-06-18 PROCEDURE — 94640 AIRWAY INHALATION TREATMENT: CPT

## 2025-06-18 PROCEDURE — 6360000002 HC RX W HCPCS

## 2025-06-18 PROCEDURE — 6370000000 HC RX 637 (ALT 250 FOR IP)

## 2025-06-18 PROCEDURE — 96366 THER/PROPH/DIAG IV INF ADDON: CPT

## 2025-06-18 PROCEDURE — 2580000003 HC RX 258

## 2025-06-18 PROCEDURE — 80053 COMPREHEN METABOLIC PANEL: CPT

## 2025-06-18 PROCEDURE — 97161 PT EVAL LOW COMPLEX 20 MIN: CPT

## 2025-06-18 PROCEDURE — 6360000004 HC RX CONTRAST MEDICATION

## 2025-06-18 PROCEDURE — 85025 COMPLETE CBC W/AUTO DIFF WBC: CPT

## 2025-06-18 PROCEDURE — 97530 THERAPEUTIC ACTIVITIES: CPT

## 2025-06-18 RX ORDER — SENNOSIDES 8.6 MG
1 TABLET ORAL DAILY
Qty: 5 TABLET | Refills: 0 | Status: SHIPPED | OUTPATIENT
Start: 2025-06-18 | End: 2025-06-23

## 2025-06-18 RX ORDER — OXYCODONE HYDROCHLORIDE 5 MG/1
5 TABLET ORAL EVERY 6 HOURS PRN
Qty: 20 TABLET | Refills: 0 | Status: SHIPPED | OUTPATIENT
Start: 2025-06-18 | End: 2025-06-23

## 2025-06-18 RX ADMIN — OXYCODONE 10 MG: 5 TABLET ORAL at 02:33

## 2025-06-18 RX ADMIN — PIPERACILLIN AND TAZOBACTAM 3375 MG: 3; .375 INJECTION, POWDER, LYOPHILIZED, FOR SOLUTION INTRAVENOUS at 05:42

## 2025-06-18 RX ADMIN — ACETAMINOPHEN 650 MG: 325 TABLET ORAL at 08:27

## 2025-06-18 RX ADMIN — ARFORMOTEROL TARTRATE 15 MCG: 15 SOLUTION RESPIRATORY (INHALATION) at 10:08

## 2025-06-18 RX ADMIN — IOPAMIDOL 75 ML: 755 INJECTION, SOLUTION INTRAVENOUS at 13:00

## 2025-06-18 RX ADMIN — IPRATROPIUM BROMIDE 0.5 MG: 0.5 SOLUTION RESPIRATORY (INHALATION) at 13:29

## 2025-06-18 RX ADMIN — OXYCODONE 5 MG: 5 TABLET ORAL at 08:33

## 2025-06-18 RX ADMIN — IPRATROPIUM BROMIDE 0.5 MG: 0.5 SOLUTION RESPIRATORY (INHALATION) at 10:07

## 2025-06-18 ASSESSMENT — PAIN DESCRIPTION - ORIENTATION
ORIENTATION: RIGHT;LEFT;MID
ORIENTATION: MID;LOWER

## 2025-06-18 ASSESSMENT — PAIN SCALES - GENERAL
PAINLEVEL_OUTOF10: 0
PAINLEVEL_OUTOF10: 10
PAINLEVEL_OUTOF10: 8
PAINLEVEL_OUTOF10: 6

## 2025-06-18 ASSESSMENT — PAIN DESCRIPTION - LOCATION
LOCATION: ABDOMEN

## 2025-06-18 ASSESSMENT — PAIN DESCRIPTION - DESCRIPTORS
DESCRIPTORS: ACHING;DISCOMFORT
DESCRIPTORS: ACHING;CRAMPING;CRUSHING

## 2025-06-18 NOTE — PROGRESS NOTES
Spiritual Health History and Assessment/Progress Note  [facility]    Loneliness/Social Isolation,  ,  ,      Name: Yevgeniy Johnson MRN: [id number]    Age: 64 y.o.     Sex: male   Language: English   Scientology: Patient Refused   Acute cholecystitis     Date: 6/18/2025                           Spiritual Assessment began in [facility] INTERMEDIATE        Referral/Consult From: Rounding   Encounter Overview/Reason: Loneliness/Social Isolation  Service Provided For: Patient    Patsy, Belief, Meaning:   Patient has beliefs or practices that help with coping during difficult times  Family/Friends No family/friends present      Importance and Influence:  Patient has no beliefs influential to healthcare decision-making identified during this visit  Family/Friends No family/friends present    Community:  Patient feels well-supported. Support system includes: Children and Friends  Family/Friends No family/friends present    Assessment and Plan of Care:     Patient Interventions include: Facilitated expression of thoughts and feelings and Affirmed coping skills/support systems  Family/Friends Interventions include: No family/friends present    Patient Plan of Care: Spiritual Care available upon further referral  Family/Friends Plan of Care: No family/friends present    Electronically signed by Chaplain Ifeanyi on 6/18/2025 at 1:27 PM

## 2025-06-18 NOTE — PLAN OF CARE
Problem: Safety - Adult  Goal: Free from fall injury  6/18/2025 0109 by Carina Rogel RN  Outcome: Progressing

## 2025-06-18 NOTE — PLAN OF CARE
Problem: Pain  Goal: Verbalizes/displays adequate comfort level or baseline comfort level  6/17/2025 2020 by Soumya Stone, RN  Outcome: Progressing  Flowsheets (Taken 6/17/2025 2020)  Verbalizes/displays adequate comfort level or baseline comfort level:   Encourage patient to monitor pain and request assistance   Assess pain using appropriate pain scale   Administer analgesics based on type and severity of pain and evaluate response  6/17/2025 1050 by Kristy Pedroza, RN  Outcome: Progressing     Problem: Safety - Adult  Goal: Free from fall injury  Outcome: Progressing  Flowsheets (Taken 6/17/2025 2020)  Free From Fall Injury: Instruct family/caregiver on patient safety     Problem: ABCDS Injury Assessment  Goal: Absence of physical injury  Outcome: Progressing     Problem: Discharge Planning  Goal: Discharge to home or other facility with appropriate resources  Outcome: Progressing

## 2025-06-18 NOTE — PLAN OF CARE
Problem: Pain  Goal: Verbalizes/displays adequate comfort level or baseline comfort level  6/18/2025 0109 by Carina Rogel RN  Outcome: Progressing  6/17/2025 2020 by Soumya Stone RN  Outcome: Progressing  Flowsheets (Taken 6/17/2025 2020)  Verbalizes/displays adequate comfort level or baseline comfort level:   Encourage patient to monitor pain and request assistance   Assess pain using appropriate pain scale   Administer analgesics based on type and severity of pain and evaluate response     Problem: Safety - Adult  Goal: Free from fall injury  6/18/2025 0109 by Carina Rogel RN  Outcome: Progressing  6/17/2025 2020 by Soumya Stone RN  Outcome: Progressing  Flowsheets (Taken 6/17/2025 2020)  Free From Fall Injury: Instruct family/caregiver on patient safety     Problem: ABCDS Injury Assessment  Goal: Absence of physical injury  6/18/2025 0109 by Carina Rogel RN  Outcome: Progressing  6/17/2025 2020 by Soumya Stone RN  Outcome: Progressing     Problem: Discharge Planning  Goal: Discharge to home or other facility with appropriate resources  6/18/2025 0109 by Carina Rogel RN  Outcome: Progressing  6/17/2025 2020 by Soumya Stone RN  Outcome: Progressing

## 2025-06-18 NOTE — PROGRESS NOTES
Physical Therapy  Facility/Department: [address] INTERMEDIATE  Physical Therapy Initial Assessment    Name: Yevgeniy Johnson  : 1960  MRN: [id number]  Date of Service: 2025    Patient Diagnosis(es): The primary encounter diagnosis was Cholecystitis. Diagnoses of Small bowel fistula, Urinary tract infection with hematuria, site unspecified, Leukocytosis, unspecified type, and Acute cholecystitis were also pertinent to this visit.  Past Medical History:  has a past medical history of Anxiety, Colonic diverticular abscess, Colovesical fistula, COPD (chronic obstructive pulmonary disease) (HCC), Hypertension, and Opiate dependence (HCC).  Past Surgical History:  has a past surgical history that includes lumbar laminectomy; knee surgery (Right); Small intestine surgery (N/A, 2025); Cystoscopy (N/A, 2025); and Cholecystectomy (N/A, 2025).          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 Pt reported owing a cane.    SUBJECTIVE:    Pt lives alone in a 1 story home with 1 stairs to enter and 0 rail.  Bed and bath is on first floor.  Pt ambulated with cane PTA.    OBJECTIVE:   Initial Evaluation  Date:  Treatment Short Term/ Long Term   Goals   Was pt agreeable to Eval/treatment? yes     Does pt have pain? Abdominal pain with mobility.     Bed Mobility  Rolling: SBA  Supine to sit: SBA  Sit to supine: NT  Scooting: SBA to sitting EOB  independent   Transfers Sit to stand: SBA  Stand to sit: SBA  Stand pivot: NT

## 2025-06-18 NOTE — PROGRESS NOTES
[facility] Quality Flow/Interdisciplinary Rounds Progress Note        Quality Flow Rounds held on June 18, 2025    Disciplines Attending:  Bedside Nurse, , , and Nursing Unit Leadership    Yevgeniy Jorge Johnson was admitted on 6/16/2025 10:56 PM    Anticipated Discharge Date:       Disposition:    Johnnie Score:  Johnnie Scale Score: 21    [facility] RISK OF UNPLANNED READMISSION 2.0             11 Total Score        Discussed patient goal for the day, patient clinical progression, and barriers to discharge.  The following Goal(s) of the Day/Commitment(s) have been identified:  discharge planning, surg, lap aron, IV zosyn, saline 100cc/hour, suboxone       Nash Bullock RN  June 18, 2025

## 2025-06-18 NOTE — PROGRESS NOTES
Internal Medicine Progress Note    Patient's name: Yevgeniy Johnson  : 1960  Chief complaints (on day of admission): Abdominal Pain (Ongoing for 2 days, pt states he had colorectal surgery with Dr. Barrett in March) and Back Pain  Admission date: 2025  Date of service: 2025   Room: [address] INTERMEDIATE  Primary care physician: Tanvir Levy MD  Reason for visit: Follow-up for acute cholecystitis     Subjective  Yevgeniy is seen lying in bed asleep, in no distress.  He does easily awaken to voice.  He reports being tired, not really sure how he feels.  He reports that he was having a lot of pain postoperatively so had trouble getting comfortable last night but seems to be better now.  He denies any nausea or vomiting, was able to eat dinner.  He does feel like he is having trouble taking a deep breath but feels like this is mostly due to the postoperative pain.  No other issues or concerns from nursing.    Review of Systems  Full 10 point review of systems negative unless mentioned above.    Hospital Medications  Current Facility-Administered Medications   Medication Dose Route Frequency Provider Last Rate Last Admin    iopamidol (ISOVUE-370) 76 % injection 75 mL  75 mL IntraVENous ONCE PRN Braden Barakat MD        traZODone (DESYREL) tablet 100 mg  100 mg Oral Nightly PRN Mary Ann Stanford APRN - VIVEK        mirtazapine (REMERON) tablet 30 mg  30 mg Oral Nightly LacivMary Ann hooker APRN - CNP   30 mg at 25 2221    albuterol (PROVENTIL) (2.5 MG/3ML) 0.083% nebulizer solution 2.5 mg  2.5 mg Nebulization Q4H PRN Mary Ann Stanford APRN - CNP        arformoterol tartrate (BROVANA) nebulizer solution 15 mcg  15 mcg Nebulization BID RT Mary Ann Stanford APRN - CNP        And    ipratropium (ATROVENT) 0.02 % nebulizer solution 0.5 mg  0.5 mg Nebulization Q6H RT LacMary Ann michel APRN - CNP   0.5 mg at 25 1240    buprenorphine-naloxone (SUBOXONE) 8-2 MG SL tablet 2 tablet  2 tablet SubLINGual  Oral   Final Result   1. Cholelithiasis with gallbladder wall thickening/pericholecystic fluid.   Clinical and laboratory correlation recommended.   2. Left lower quadrant colostomy without evidence of bowel obstruction.   Sigmoid wall thickening and mild stranding at the anastomotic site.   3. Soft tissue thickening with mild associated stranding in the right lower   quadrant, which appears closely associated with the adjacent distal small   bowel. This could be secondary to a developing fistula.   4. Bladder wall thickening, more prominent on the left. Correlation with   urinalysis is recommended.         XR CHEST PORTABLE   Final Result   No acute cardiopulmonary pathology seen.             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]     Opiate dependence ([facility]) [F11.20]     Anxiety [F41.9]     COPD (chronic obstructive pulmonary disease) ([facility]) [J44.9]     Hypertension [I10]          Plan  Acute Cholecystitis with Perforated Gallbladder  CT abdomen/pelvis noted  S/p laparoscopic robotic cholecystectomy with ICG cholangiogram 6/17  Continue IV Zosyn as per Surgery -- will complete 6/18  Stop IV fluids as diet has been advanced and tolerating  Continue Oxy IR as needed for pain per Surgery -- monitor closely given Suboxone  General Surgery consult appreciated     ?Small Bowel Fistula with Hx Colovesical Fistula/Diverticular Abscess  Hx takedown of colovesicular fistula and SB fistula with right hemicolectomy, Kang's and drainage of intra-abdominal abscess 3/19/25  CT abdomen/pelvis 6/17 concerning for developing fistula  Diet has been advanced per Surgery  Continue to monitor abdominal exam     ?UTI  UA noted, culture is pending  Continues on IV Zosyn for acute cholecystitis   Given lack of urinary complaints doubt true UTI     COPD without Exacerbation  Continue bronchodilators  Monitor respiratory status closely     BPH without Urinary

## 2025-06-18 NOTE — DISCHARGE INSTRUCTIONS
Instructions After Abdominal Surgery    You had abdominal surgery at [facility].  Please follow the instructions on this sheet for your follow-up care and make an appointment for a follow-up visit.    Activity/Sleep/Return to Work  Unless instructed otherwise, you may walk, climb stairs, ride as a passenger in a car, and engage in other activities of daily living as tolerated.  It is normal to feel fatigued and require more sleep than usual during your recovery.  Also, major surgery and being in the hospital can disrupt sleep patterns.  We recommend allowing sleep patterns to return to normal over time and avoid sleep medication unless previously prescribed.  Avoid heavy lifting (> 10 pounds or more) for 6 weeks.  Avoid driving for 2 weeks or as long as you have pain and are taking narcotic pain medication.  You may resume work as tolerated unless instructed otherwise or if your work involves heavy lifting.  Please bring any forms related to work, insurance, or disability to your first postoperative visit.    Bowel Movements  It is common to have irregular, loose watery stools for several days after abdominal surgery.  If watery diarrhea lasts more than a few days or you have more than 8 large volume liquid bowel movements in one day then call the office.  You may have altered bacteria in your colon and need a prescription for an antibiotic.  Avoid caffeine and alcohol (they alter bowel activity and can contribute to diarrhea or constipation).  You may take Mineral Oil 1 tablespoon twice daily to soften your stool.  If you have diarrhea, stop this medication.  If you have constipation and feel uncomfortable, then please call the office during office hours.    Urination    Patients who had a urinary catheter (“Jorge”) placed for surgery often have minor discomfort with urination for several days after removal of the catheter.  If discomfort persists or worsens, an infection may have occurred and

## 2025-06-18 NOTE — CARE COORDINATION
Dc order in place. S/p lap zenia assisted aron on 6/17. Chart review completed at pt is from home and independent. Pt has a PCP and insurance. No needs identified for CM.   LLIIAM AlegreN, RN  [facility] Case Management  [phone]

## 2025-06-18 NOTE — PLAN OF CARE
Problem: Pain  Goal: Verbalizes/displays adequate comfort level or baseline comfort level  6/18/2025 1042 by Gina Ruiz RN  Outcome: Progressing  6/18/2025 0109 by Carina Rogel, RN  Outcome: Progressing

## 2025-06-18 NOTE — ACP (ADVANCE CARE PLANNING)
Advance Care Planning   Healthcare Decision Maker:    Primary Decision Maker: Mathew Johnson A - Child - [phone]      Today we documented Decision Maker(s) consistent with Legal Next of Kin hierarchy.

## 2025-06-18 NOTE — DISCHARGE SUMMARY
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 male patient of Tanvir Levy MD who  has a past medical history of Anxiety, Colonic diverticular abscess, Colovesical fistula, COPD (chronic obstructive pulmonary disease) (HCC), Hypertension, and Opiate dependence (HCC). who originally had concerns including Abdominal Pain (Ongoing for 2 days, pt states he had colorectal surgery with Dr. Barrett in March) and Back Pain. 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    Please see H&P for further details.    HOSPITAL COURSE:   The patient presented to the hospital with the chief complaint of Abdominal Pain (Ongoing for 2 days, pt states he had colorectal surgery with Dr. Barrett in March) and Back Pain  . The patient was admitted to the hospital.     Acute Cholecystitis with Perforated Gallbladder  CT abdomen/pelvis noted  S/p

## 2025-06-18 NOTE — PROGRESS NOTES
GENERAL SURGERY  DAILY PROGRESS NOTE    Patient's Name/Date of Birth: Yevgeniy Johnson / 1960    Date: 2025     Chief Complaint   Patient presents with    Abdominal Pain     Ongoing for 2 days, pt states he had colorectal surgery with Dr. Barrett in March    Back Pain        Subjective:  Patient is complaining from moderate abdominal pain.  Denies nausea or emesis.  Tolerated diet yesterday.  He is having stool output from his ostomy.      Objective:  Last 24Hrs  Temp  Av °F (36.7 °C)  Min: 97.5 °F (36.4 °C)  Max: 98.8 °F (37.1 °C)  Resp  Av.4  Min: 14  Max: 18  Pulse  Av.9  Min: 55  Max: 85  Systolic (24hrs), Av , Min:96 , Max:148     Diastolic (24hrs), Av, Min:48, Max:90    SpO2  Av.6 %  Min: 93 %  Max: 98 %    I/O last 3 completed shifts:  In: 1780.8 [I.V.:499; IV Piggyback:1281.9]  Out: 950 [Urine:950]      General: In no acute distress, alert and oriented x4  Cardiovascular: Warm throughout, no edema  Respiratory: no respiratory distress, equal chest rise  Abdomen: soft, appropriately tender to palpation, nondistended, laparoscopic incisions are C/D/I with skin glue in place.  Skin: no obvious rashes or lesions appreciated  Extremities: atraumatic, no focal motor deficits      CBC  Recent Labs     25  2331 25  1020 25  0545   WBC 14.7* 15.6* 13.5*   RBC 4.74 4.67 4.36   HGB 12.6 12.2* 11.6*   HCT 38.7 38.9 35.2*   MCV 81.6 83.3 80.7   MCH 26.6 26.1 26.6   MCHC 32.6 31.4* 33.0   RDW 14.8 14.6 14.9    352 310   MPV 9.0 9.5 9.6       CMP  Recent Labs     25  2331 25  1020 25  0545    135 140   K 4.2 3.8 4.5   CL 96* 98 103   CO2 25 25 26   BUN 11 7 9   CREATININE 0.7 0.6* 0.7   GLUCOSE 109* 118* 127*   CALCIUM 9.7 9.2 9.2   BILITOT 0.2 0.3 0.3   ALKPHOS 99 98 80   AST 12 13 19   ALT 10 10 20         Assessment/Plan:    Patient Active Problem List   Diagnosis    Pelvic abscess in male (HCC)    Hypertension    COPD

## 2025-06-19 LAB
MICROORGANISM SPEC CULT: ABNORMAL
SERVICE CMNT-IMP: ABNORMAL
SPECIMEN DESCRIPTION: ABNORMAL

## 2025-06-23 NOTE — PROGRESS NOTES
Physician Progress Note      PATIENT:               SAMI HAMMER  CSN #:                  [id number]  :                       1960  ADMIT DATE:       2025 10:56 PM  DISCH DATE:        2025 2:18 PM  RESPONDING  PROVIDER #:        MARCOS WARNER APRN - CNP          QUERY TEXT:    Noted to have BPH without Urinary Retention on Flomax is documented in the   medical record H&P by Tyrell Hancock, DO 2025. Please specify the   associated urinary conditions:    The clinical indicators include:  -Age 64 yrs male, UTI, Hx of Opiate dependence.    -\"Pt presented for the evaluation of abdominal pain and back pain, found to   have Urinary tract infection with hematuria, site unspecified.\" (ED by Yan Cerna MD )  -\" UTI, UA noted, Urine cx pending. BPH without Urinary Retention, on IV   Rocephin, Flomax.\" (H&P by Tyrell Hancock, DO )  -\"? UTI, UA noted, culture is pending, given lack of urinary complaints doubt   true UTI, BPH without Urinary Retention on IV Rocephin, Flomax.\" (PN by Tyrell Hancock, DO , DS by Marcos Suarez, APRN - CNP )        -Treated with Flomax, Serial labs.    Nina [email or web address].  Options provided:  -- BPH with partial/complete urinary obstruction  -- Other - I will add my own diagnosis  -- Disagree - Not applicable / Not valid  -- Disagree - Clinically unable to determine / Unknown  -- Refer to Clinical Documentation Reviewer    PROVIDER RESPONSE TEXT:    This patient has BPH with partial/complete urinary obstruction.    Query created by: Nina Castellanos on 2025 11:16 AM      Electronically signed by:  MARCOS DAVIDSON - CNP 2025 11:20 AM

## 2025-06-24 LAB — SURGICAL PATHOLOGY REPORT: NORMAL

## (undated) DEVICE — DOUBLE BASIN SET: Brand: MEDLINE INDUSTRIES, INC.

## (undated) DEVICE — STAPLER INT L60MM REG TISS BLU B FRM 8 FIRING 2 ROW AUTO

## (undated) DEVICE — DRAIN SURG 15FR SIL RND CHN W/ TRCR FULL FLUT DBL WRP TRAD

## (undated) DEVICE — GUIDEWIRE URO L150CM DIA0.035IN TAPR 3CM NIT HYDRPHLC ANG

## (undated) DEVICE — SUCTION IRRIGATOR: Brand: ENDOWRIST

## (undated) DEVICE — RELOAD STPL 40MM H1.5-3.5MM WIRE 0.2MM REG TISS BLU CRV

## (undated) DEVICE — LARGE HEM-O-LOK CLIP APPLIER: Brand: ENDOWRIST

## (undated) DEVICE — GUIDEWIRE UROLOGICAL STR STD 0.035 IN X150 CM (QTY = BOX OF 5)

## (undated) DEVICE — BLADE CLIPPER GEN PURP NS

## (undated) DEVICE — DRAPE,LAP,CHOLE,W/TROUGHS,STERILE: Brand: MEDLINE

## (undated) DEVICE — SEALER ENDOSCP NANO COAT OPN DIV CRV L JAW LIGASURE IMPACT

## (undated) DEVICE — Device

## (undated) DEVICE — BLADE,STAINLESS-STEEL,15,STRL,DISPOSABLE: Brand: MEDLINE

## (undated) DEVICE — TUBING, SUCTION, 3/16" X 12', STRAIGHT: Brand: MEDLINE

## (undated) DEVICE — GAUZE,SPONGE,4"X4",8PLY,STRL,LF,10/TRAY: Brand: MEDLINE

## (undated) DEVICE — TOWEL,OR,DSP,ST,BLUE,STD,6/PK,12PK/CS: Brand: MEDLINE

## (undated) DEVICE — PERMANENT CAUTERY HOOK: Brand: ENDOWRIST

## (undated) DEVICE — CYSTO PACK: Brand: MEDLINE INDUSTRIES, INC.

## (undated) DEVICE — KIT,ANTI FOG,W/SPONGE & FLUID,SOFT PACK: Brand: MEDLINE

## (undated) DEVICE — STAPLER INT L75MM CUT LN L73MM STPL LN L77MM BLU B FRM 8

## (undated) DEVICE — ENDOSCOPIC KIT CLN SWAB MICROFIBER CLTH SCP CLEANOR DISP

## (undated) DEVICE — DRAPE,LAPAROTOMY,PCH,STERILE: Brand: MEDLINE

## (undated) DEVICE — LIQUIBAND RAPID ADHESIVE 36/CS 0.8ML: Brand: MEDLINE

## (undated) DEVICE — MARKER,SKIN,WI/RULER AND LABELS: Brand: MEDLINE

## (undated) DEVICE — STAPLER INT CUT LN 51MM STPL 51MM BLU CRV HD B FRM

## (undated) DEVICE — GLOVE ORANGE PI 7   MSG9070

## (undated) DEVICE — SOLUTION IRRIG 3000ML STRL H2O USP UROMATIC PLAS CONT

## (undated) DEVICE — ELECTRODE PT RET AD L9FT HI MOIST COND ADH HYDRGEL CORDED

## (undated) DEVICE — RELOAD STPL 40MM H1.5-4.7MM WIRE 0.2MM REG TISS GRN CRV

## (undated) DEVICE — WARMER SCP 2 ANTIFOG LAP DISP

## (undated) DEVICE — GOWN,SIRUS,FABRNF,XL,20/CS: Brand: MEDLINE

## (undated) DEVICE — SYRINGE 20ML LL S/C 50

## (undated) DEVICE — BLADELESS OBTURATOR: Brand: WECK VISTA

## (undated) DEVICE — ANCHOR TISSUE RETRIEVAL SYSTEM, BAG SIZE 125 ML, PORT SIZE 8 MM: Brand: ANCHOR TISSUE RETRIEVAL SYSTEM

## (undated) DEVICE — APPLICATOR MEDICATED 26 CC SOLUTION HI LT ORNG CHLORAPREP

## (undated) DEVICE — COLUMN DRAPE

## (undated) DEVICE — BLADE,STAINLESS-STEEL,11,STRL,DISPOSABLE: Brand: MEDLINE

## (undated) DEVICE — YANKAUER,POOLE TIP,STERILE,50/CS: Brand: MEDLINE

## (undated) DEVICE — INSUFFLATION NEEDLE TO ESTABLISH PNEUMOPERITONEUM.: Brand: INSUFFLATION NEEDLE

## (undated) DEVICE — SOLUTION IRRIG 1000ML 0.9% SOD CHL USP POUR PLAS BTL

## (undated) DEVICE — SPONGE LAP W18XL18IN WHT COT 4 PLY FLD STRUNG RADPQ DISP ST 2 PER PACK

## (undated) DEVICE — INSUFFLATION TUBING SET WITH FILTER, FUNNEL CONNECTOR AND LUER LOCK: Brand: JOSNOE MEDICAL INC

## (undated) DEVICE — MEDICINE CUP, GRADUATED, STER: Brand: MEDLINE

## (undated) DEVICE — GUIDEWIRE ENDOSCP L150CM DIA0.035IN TIP 3CM PTFE NIT

## (undated) DEVICE — ARM DRAPE

## (undated) DEVICE — SEAL

## (undated) DEVICE — GLOVE SURG SZ 75 CRM LTX FREE POLYISOPRENE POLYMER BEAD ANTI

## (undated) DEVICE — RELOAD STPL L75MM OPN H3.8MM CLS 1.5MM WIRE DIA0.2MM REG

## (undated) DEVICE — PAD,NON-ADHERENT,3X8,STERILE,LF,1/PK: Brand: MEDLINE

## (undated) DEVICE — ROUND TIP SCISSORS: Brand: ENDOWRIST

## (undated) DEVICE — PROGRASP FORCEPS: Brand: ENDOWRIST

## (undated) DEVICE — MEDIUM-LARGE CLIP APPLIER: Brand: ENDOWRIST

## (undated) DEVICE — YANKAUER,OPEN TIP,W/O VENT,STERILE: Brand: MEDLINE INDUSTRIES, INC.

## (undated) DEVICE — DRAPE,REIN 53X77,STERILE: Brand: MEDLINE

## (undated) DEVICE — SOLUTION SCRB 4OZ 4% CHG H2O AIDED FOR PREOPERATIVE SKIN

## (undated) DEVICE — 4-PORT MANIFOLD: Brand: NEPTUNE 2

## (undated) DEVICE — SOLUTION IRRIG 3000ML 0.9% SOD CHL USP UROMATIC PLAS CONT

## (undated) DEVICE — CONNECTOR CATH URET SIDE PRT FOR FRIC CONN UCA595] GU TEK]

## (undated) DEVICE — SKN PREP SPNG STKS PVP PNT STR: Brand: MEDLINE INDUSTRIES, INC.

## (undated) DEVICE — NEEDLE HYPO 23GA L1.5IN TURQ POLYPR HUB S STL THN WALL IM

## (undated) DEVICE — CADIERE FORCEPS: Brand: ENDOWRIST